# Patient Record
Sex: FEMALE | Race: BLACK OR AFRICAN AMERICAN | NOT HISPANIC OR LATINO | Employment: OTHER | URBAN - METROPOLITAN AREA
[De-identification: names, ages, dates, MRNs, and addresses within clinical notes are randomized per-mention and may not be internally consistent; named-entity substitution may affect disease eponyms.]

---

## 2017-12-22 ENCOUNTER — HISTORICAL (OUTPATIENT)
Dept: ADMINISTRATIVE | Facility: HOSPITAL | Age: 67
End: 2017-12-22

## 2017-12-22 LAB
T3FREE SERPL-MCNC: 2.36 PG/ML (ref 2.18–3.98)
T4 FREE SERPL-MCNC: 1.15 NG/DL (ref 0.76–1.46)
TSH SERPL-ACNC: 0.48 MIU/L (ref 0.36–3.74)

## 2018-01-15 ENCOUNTER — HISTORICAL (OUTPATIENT)
Dept: INTERNAL MEDICINE | Facility: CLINIC | Age: 68
End: 2018-01-15

## 2018-12-11 ENCOUNTER — HISTORICAL (OUTPATIENT)
Dept: ADMINISTRATIVE | Facility: HOSPITAL | Age: 68
End: 2018-12-11

## 2018-12-11 LAB
ABS NEUT (OLG): 3.01 X10(3)/MCL (ref 2.1–9.2)
ALBUMIN SERPL-MCNC: 3.7 GM/DL (ref 3.4–5)
ALBUMIN/GLOB SERPL: 1 RATIO (ref 1–2)
ALP SERPL-CCNC: 88 UNIT/L (ref 45–117)
ALT SERPL-CCNC: 30 UNIT/L (ref 12–78)
AST SERPL-CCNC: 16 UNIT/L (ref 15–37)
BASOPHILS # BLD AUTO: 0.03 X10(3)/MCL
BASOPHILS NFR BLD AUTO: 0 %
BILIRUB SERPL-MCNC: 0.3 MG/DL (ref 0.2–1)
BILIRUBIN DIRECT+TOT PNL SERPL-MCNC: 0.1 MG/DL
BILIRUBIN DIRECT+TOT PNL SERPL-MCNC: 0.2 MG/DL
BUN SERPL-MCNC: 24 MG/DL (ref 7–18)
CALCIUM SERPL-MCNC: 9 MG/DL (ref 8.5–10.1)
CHLORIDE SERPL-SCNC: 108 MMOL/L (ref 98–107)
CHOLEST SERPL-MCNC: 134 MG/DL
CHOLEST/HDLC SERPL: 2.5 {RATIO} (ref 0–4.4)
CO2 SERPL-SCNC: 30 MMOL/L (ref 21–32)
CREAT SERPL-MCNC: 1.1 MG/DL (ref 0.6–1.3)
EOSINOPHIL # BLD AUTO: 0.2 10*3/UL
EOSINOPHIL NFR BLD AUTO: 3 %
ERYTHROCYTE [DISTWIDTH] IN BLOOD BY AUTOMATED COUNT: 13.4 % (ref 11.5–14.5)
EST. AVERAGE GLUCOSE BLD GHB EST-MCNC: 117 MG/DL
GLOBULIN SER-MCNC: 4 GM/ML (ref 2.3–3.5)
GLUCOSE SERPL-MCNC: 102 MG/DL (ref 74–106)
HBA1C MFR BLD: 5.7 % (ref 4.2–6.3)
HCT VFR BLD AUTO: 44.6 % (ref 35–46)
HDLC SERPL-MCNC: 53 MG/DL
HGB BLD-MCNC: 15 GM/DL (ref 12–16)
IMM GRANULOCYTES # BLD AUTO: 0.03 10*3/UL
IMM GRANULOCYTES NFR BLD AUTO: 0 %
LDLC SERPL CALC-MCNC: 65 MG/DL (ref 0–130)
LYMPHOCYTES # BLD AUTO: 2.77 X10(3)/MCL
LYMPHOCYTES NFR BLD AUTO: 42 % (ref 13–40)
MCH RBC QN AUTO: 29.2 PG (ref 26–34)
MCHC RBC AUTO-ENTMCNC: 33.6 GM/DL (ref 31–37)
MCV RBC AUTO: 86.8 FL (ref 80–100)
MONOCYTES # BLD AUTO: 0.6 X10(3)/MCL
MONOCYTES NFR BLD AUTO: 9 % (ref 4–12)
NEUTROPHILS # BLD AUTO: 3.01 X10(3)/MCL
NEUTROPHILS NFR BLD AUTO: 45 X10(3)/MCL
PLATELET # BLD AUTO: 207 X10(3)/MCL (ref 130–400)
PMV BLD AUTO: 10.4 FL (ref 7.4–10.4)
POTASSIUM SERPL-SCNC: 3.4 MMOL/L (ref 3.5–5.1)
PROT SERPL-MCNC: 7.7 GM/DL (ref 6.4–8.2)
RBC # BLD AUTO: 5.14 X10(6)/MCL (ref 4–5.2)
SODIUM SERPL-SCNC: 144 MMOL/L (ref 136–145)
TRIGL SERPL-MCNC: 78 MG/DL
TSH SERPL-ACNC: 0.74 MIU/L (ref 0.36–3.74)
VLDLC SERPL CALC-MCNC: 16 MG/DL
WBC # SPEC AUTO: 6.6 X10(3)/MCL (ref 4.5–11)

## 2018-12-20 ENCOUNTER — HISTORICAL (OUTPATIENT)
Dept: INTERNAL MEDICINE | Facility: CLINIC | Age: 68
End: 2018-12-20

## 2019-05-07 ENCOUNTER — HISTORICAL (OUTPATIENT)
Dept: INTERNAL MEDICINE | Facility: CLINIC | Age: 69
End: 2019-05-07

## 2019-05-07 LAB
ABS NEUT (OLG): 3.06 X10(3)/MCL (ref 2.1–9.2)
ALBUMIN SERPL-MCNC: 3.7 GM/DL (ref 3.4–5)
ALBUMIN/GLOB SERPL: 0.9 RATIO (ref 1.1–2)
ALP SERPL-CCNC: 91 UNIT/L (ref 45–117)
ALT SERPL-CCNC: 28 UNIT/L (ref 12–78)
AST SERPL-CCNC: 18 UNIT/L (ref 15–37)
BASOPHILS # BLD AUTO: 0.04 X10(3)/MCL
BASOPHILS NFR BLD AUTO: 1 %
BILIRUB SERPL-MCNC: 0.5 MG/DL (ref 0.2–1)
BILIRUBIN DIRECT+TOT PNL SERPL-MCNC: 0.2 MG/DL
BILIRUBIN DIRECT+TOT PNL SERPL-MCNC: 0.3 MG/DL
BUN SERPL-MCNC: 22 MG/DL (ref 7–18)
CALCIUM SERPL-MCNC: 9.2 MG/DL (ref 8.5–10.1)
CHLORIDE SERPL-SCNC: 111 MMOL/L (ref 98–107)
CHOLEST SERPL-MCNC: 147 MG/DL
CHOLEST/HDLC SERPL: 2.8 {RATIO} (ref 0–4.4)
CO2 SERPL-SCNC: 28 MMOL/L (ref 21–32)
CREAT SERPL-MCNC: 1.2 MG/DL (ref 0.6–1.3)
EOSINOPHIL # BLD AUTO: 0.24 10*3/UL
EOSINOPHIL NFR BLD AUTO: 3 %
ERYTHROCYTE [DISTWIDTH] IN BLOOD BY AUTOMATED COUNT: 13.3 % (ref 11.5–14.5)
GLOBULIN SER-MCNC: 4.2 GM/ML (ref 2.3–3.5)
GLUCOSE SERPL-MCNC: 93 MG/DL (ref 74–106)
HCT VFR BLD AUTO: 45.5 % (ref 35–46)
HDLC SERPL-MCNC: 53 MG/DL
HGB BLD-MCNC: 14.9 GM/DL (ref 12–16)
IMM GRANULOCYTES # BLD AUTO: 0.02 10*3/UL
IMM GRANULOCYTES NFR BLD AUTO: 0 %
LDLC SERPL CALC-MCNC: 79 MG/DL (ref 0–130)
LYMPHOCYTES # BLD AUTO: 3.16 X10(3)/MCL
LYMPHOCYTES NFR BLD AUTO: 44 % (ref 13–40)
MCH RBC QN AUTO: 28.6 PG (ref 26–34)
MCHC RBC AUTO-ENTMCNC: 32.7 GM/DL (ref 31–37)
MCV RBC AUTO: 87.3 FL (ref 80–100)
MONOCYTES # BLD AUTO: 0.62 X10(3)/MCL
MONOCYTES NFR BLD AUTO: 9 % (ref 4–12)
NEUTROPHILS # BLD AUTO: 3.06 X10(3)/MCL
NEUTROPHILS NFR BLD AUTO: 43 X10(3)/MCL
PLATELET # BLD AUTO: 200 X10(3)/MCL (ref 130–400)
PMV BLD AUTO: 10.3 FL (ref 7.4–10.4)
POTASSIUM SERPL-SCNC: 3.8 MMOL/L (ref 3.5–5.1)
PROT SERPL-MCNC: 7.9 GM/DL (ref 6.4–8.2)
RBC # BLD AUTO: 5.21 X10(6)/MCL (ref 4–5.2)
SODIUM SERPL-SCNC: 144 MMOL/L (ref 136–145)
T3FREE SERPL-MCNC: 2.47 PG/ML (ref 2.18–3.98)
T4 FREE SERPL-MCNC: 1.16 NG/DL (ref 0.76–1.46)
TRIGL SERPL-MCNC: 75 MG/DL
TSH SERPL-ACNC: 0.57 MIU/L (ref 0.36–3.74)
VLDLC SERPL CALC-MCNC: 15 MG/DL
WBC # SPEC AUTO: 7.1 X10(3)/MCL (ref 4.5–11)

## 2021-11-29 ENCOUNTER — HISTORICAL (OUTPATIENT)
Dept: RADIOLOGY | Facility: HOSPITAL | Age: 71
End: 2021-11-29

## 2021-12-08 ENCOUNTER — HISTORICAL (OUTPATIENT)
Dept: LAB | Facility: HOSPITAL | Age: 71
End: 2021-12-08

## 2021-12-08 LAB
ABS NEUT (OLG): 3.42 X10(3)/MCL (ref 2.1–9.2)
ALBUMIN SERPL-MCNC: 4.1 GM/DL (ref 3.4–4.8)
ALBUMIN/GLOB SERPL: 1.3 RATIO (ref 1.1–2)
ALP SERPL-CCNC: 94 UNIT/L (ref 40–150)
ALT SERPL-CCNC: 21 UNIT/L (ref 0–55)
APPEARANCE, UA: CLEAR
AST SERPL-CCNC: 23 UNIT/L (ref 5–34)
BACTERIA SPEC CULT: NORMAL
BASOPHILS # BLD AUTO: 0 X10(3)/MCL (ref 0–0.2)
BASOPHILS NFR BLD AUTO: 0 %
BILIRUB SERPL-MCNC: 0.6 MG/DL
BILIRUB UR QL STRIP: NEGATIVE
BILIRUBIN DIRECT+TOT PNL SERPL-MCNC: 0.2 MG/DL (ref 0–0.5)
BILIRUBIN DIRECT+TOT PNL SERPL-MCNC: 0.4 MG/DL (ref 0–0.8)
BUN SERPL-MCNC: 33.3 MG/DL (ref 9.8–20.1)
CALCIUM SERPL-MCNC: 9.9 MG/DL (ref 8.7–10.5)
CHLORIDE SERPL-SCNC: 107 MMOL/L (ref 98–107)
CHOLEST SERPL-MCNC: 180 MG/DL
CHOLEST/HDLC SERPL: 3 {RATIO} (ref 0–5)
CO2 SERPL-SCNC: 26 MMOL/L (ref 23–31)
COLOR UR: YELLOW
CREAT SERPL-MCNC: 2.06 MG/DL (ref 0.55–1.02)
CREAT UR-MCNC: 70.2 MG/DL (ref 45–106)
EOSINOPHIL # BLD AUTO: 0.2 X10(3)/MCL (ref 0–0.9)
EOSINOPHIL NFR BLD AUTO: 3 %
ERYTHROCYTE [DISTWIDTH] IN BLOOD BY AUTOMATED COUNT: 14 % (ref 11.5–17)
EST. AVERAGE GLUCOSE BLD GHB EST-MCNC: 102.5 MG/DL
GLOBULIN SER-MCNC: 3.2 GM/DL (ref 2.4–3.5)
GLUCOSE (UA): NEGATIVE
GLUCOSE SERPL-MCNC: 84 MG/DL (ref 82–115)
HBA1C MFR BLD: 5.2 %
HCT VFR BLD AUTO: 42.1 % (ref 37–47)
HDLC SERPL-MCNC: 62 MG/DL (ref 35–60)
HGB BLD-MCNC: 13.7 GM/DL (ref 12–16)
HGB UR QL STRIP: NEGATIVE
IMM GRANULOCYTES # BLD AUTO: 0.01 % (ref 0–0.02)
IMM GRANULOCYTES NFR BLD AUTO: 0.2 % (ref 0–0.43)
KETONES UR QL STRIP: NEGATIVE
LDLC SERPL CALC-MCNC: 106 MG/DL (ref 50–140)
LEUKOCYTE ESTERASE UR QL STRIP: NEGATIVE
LYMPHOCYTES # BLD AUTO: 2.1 X10(3)/MCL (ref 0.6–4.6)
LYMPHOCYTES NFR BLD AUTO: 33 %
MCH RBC QN AUTO: 28.8 PG (ref 27–31)
MCHC RBC AUTO-ENTMCNC: 32.5 GM/DL (ref 33–36)
MCV RBC AUTO: 88.4 FL (ref 80–94)
MONOCYTES # BLD AUTO: 0.6 X10(3)/MCL (ref 0.1–1.3)
MONOCYTES NFR BLD AUTO: 9 %
NEUTROPHILS # BLD AUTO: 3.42 X10(3)/MCL (ref 1.4–7.9)
NEUTROPHILS NFR BLD AUTO: 54 %
NITRITE UR QL STRIP: NEGATIVE
PH UR STRIP: 7 [PH] (ref 5–9)
PLATELET # BLD AUTO: 227 X10(3)/MCL (ref 130–400)
PMV BLD AUTO: 10.8 FL (ref 9.4–12.4)
POTASSIUM SERPL-SCNC: 4 MMOL/L (ref 3.5–5.1)
PROT SERPL-MCNC: 7.3 GM/DL (ref 5.8–7.6)
PROT UR QL STRIP: >=300
PROT UR STRIP-MCNC: 159.5 MG/DL
RBC # BLD AUTO: 4.76 X10(6)/MCL (ref 4.2–5.4)
RBC #/AREA URNS HPF: NORMAL /[HPF]
SODIUM SERPL-SCNC: 143 MMOL/L (ref 136–145)
SP GR UR STRIP: 1.02 (ref 1–1.03)
SQUAMOUS EPITHELIAL, UA: NORMAL
T4 FREE SERPL-MCNC: 1 NG/DL (ref 0.7–1.48)
TRIGL SERPL-MCNC: 61 MG/DL (ref 37–140)
TSH SERPL-ACNC: 1.14 UIU/ML (ref 0.35–4.94)
UROBILINOGEN UR STRIP-ACNC: 0.2
VLDLC SERPL CALC-MCNC: 12 MG/DL
WBC # SPEC AUTO: 6.3 X10(3)/MCL (ref 4.5–11.5)
WBC #/AREA URNS HPF: NORMAL /[HPF]

## 2021-12-09 ENCOUNTER — HISTORICAL (OUTPATIENT)
Dept: ADMINISTRATIVE | Facility: HOSPITAL | Age: 71
End: 2021-12-09

## 2021-12-09 ENCOUNTER — HISTORICAL (OUTPATIENT)
Dept: LAB | Facility: HOSPITAL | Age: 71
End: 2021-12-09

## 2021-12-09 LAB
CREAT UR-MCNC: 67.8 MG/DL (ref 45–106)
PROT UR STRIP-MCNC: 54.9 MG/DL
PROT/CREAT UR-RTO: 809.7 MG/GM CR

## 2021-12-28 LAB — BCS RECOMMENDATION EXT: NORMAL

## 2022-03-14 ENCOUNTER — HISTORICAL (OUTPATIENT)
Dept: ADMINISTRATIVE | Facility: HOSPITAL | Age: 72
End: 2022-03-14

## 2022-03-21 ENCOUNTER — HISTORICAL (OUTPATIENT)
Dept: RADIOLOGY | Facility: HOSPITAL | Age: 72
End: 2022-03-21

## 2022-03-31 ENCOUNTER — HISTORICAL (OUTPATIENT)
Dept: RADIOLOGY | Facility: HOSPITAL | Age: 72
End: 2022-03-31

## 2022-03-31 ENCOUNTER — HISTORICAL (OUTPATIENT)
Dept: ADMINISTRATIVE | Facility: HOSPITAL | Age: 72
End: 2022-03-31

## 2022-04-01 ENCOUNTER — HISTORICAL (OUTPATIENT)
Dept: LAB | Facility: HOSPITAL | Age: 72
End: 2022-04-01

## 2022-04-01 LAB
ABS NEUT (OLG): 3.26 (ref 2.1–9.2)
ALBUMIN SERPL-MCNC: 4 G/DL (ref 3.4–4.8)
ALBUMIN/GLOB SERPL: 1.1 {RATIO} (ref 1.1–2)
ALP SERPL-CCNC: 90 U/L (ref 40–150)
ALT SERPL-CCNC: 26 U/L (ref 0–55)
APPEARANCE, UA: CLEAR
AST SERPL-CCNC: 22 U/L (ref 5–34)
BACTERIA SPEC CULT: NORMAL
BASOPHILS # BLD AUTO: 0 10*3/UL (ref 0–0.2)
BASOPHILS NFR BLD AUTO: 0 %
BILIRUB SERPL-MCNC: 0.6 MG/DL
BILIRUB UR QL STRIP: NEGATIVE
BILIRUBIN DIRECT+TOT PNL SERPL-MCNC: 0.3 (ref 0–0.5)
BILIRUBIN DIRECT+TOT PNL SERPL-MCNC: 0.3 (ref 0–0.8)
BUN SERPL-MCNC: 32.2 MG/DL (ref 9.8–20.1)
C3 SERPL-MCNC: 123 MG/DL (ref 80–173)
C4 SERPL-MCNC: 55 MG/DL (ref 13–46)
CALCIUM SERPL-MCNC: 9.9 MG/DL (ref 8.7–10.5)
CHLORIDE SERPL-SCNC: 109 MMOL/L (ref 98–107)
CO2 SERPL-SCNC: 26 MMOL/L (ref 23–31)
COLOR UR: YELLOW
CREAT SERPL-MCNC: 2.37 MG/DL (ref 0.55–1.02)
CREAT UR-MCNC: 152.3 MG/DL (ref 45–106)
CRP SERPL-MCNC: 1 MG/L (ref 0–1)
EOSINOPHIL # BLD AUTO: 0.3 10*3/UL (ref 0–0.9)
EOSINOPHIL NFR BLD AUTO: 5 %
ERYTHROCYTE [DISTWIDTH] IN BLOOD BY AUTOMATED COUNT: 13.5 % (ref 11.5–17)
GLOBULIN SER-MCNC: 3.5 G/DL (ref 2.4–3.5)
GLUCOSE (UA): NEGATIVE
GLUCOSE SERPL-MCNC: 88 MG/DL (ref 82–115)
HBV SURFACE AG SERPL QL IA: 0.29
HBV SURFACE AG SERPL QL IA: NONREACTIVE
HCT VFR BLD AUTO: 41 % (ref 37–47)
HCV AB SERPL QL IA: 0.47
HCV AB SERPL QL IA: NONREACTIVE
HEMOLYSIS INTERF INDEX SERPL-ACNC: 0
HGB BLD-MCNC: 12.9 G/DL (ref 12–16)
HGB UR QL STRIP: NEGATIVE
ICTERIC INTERF INDEX SERPL-ACNC: 1
IMM GRANULOCYTES # BLD AUTO: 0.01 10*3/UL (ref 0–0.02)
IMM GRANULOCYTES NFR BLD AUTO: 0.2 % (ref 0–0.43)
KETONES UR QL STRIP: NEGATIVE
LEUKOCYTE ESTERASE UR QL STRIP: NEGATIVE
LIPEMIC INTERF INDEX SERPL-ACNC: 2
LYMPHOCYTES # BLD AUTO: 1.6 10*3/UL (ref 0.6–4.6)
LYMPHOCYTES NFR BLD AUTO: 28 %
MANUAL DIFF? (OHS): NO
MCH RBC QN AUTO: 28.4 PG (ref 27–31)
MCHC RBC AUTO-ENTMCNC: 31.5 G/DL (ref 33–36)
MCV RBC AUTO: 90.3 FL (ref 80–94)
MONOCYTES # BLD AUTO: 0.6 10*3/UL (ref 0.1–1.3)
MONOCYTES NFR BLD AUTO: 10 %
NEUTROPHILS # BLD AUTO: 3.26 10*3/UL (ref 1.4–7.9)
NEUTROPHILS NFR BLD AUTO: 56 %
NITRITE UR QL STRIP: NEGATIVE
PH UR STRIP: 6 [PH] (ref 5–9)
PHOSPHATE SERPL-MCNC: 3.7 MG/DL (ref 2.3–4.7)
PLATELET # BLD AUTO: 170 10*3/UL (ref 130–400)
PMV BLD AUTO: 10.6 FL (ref 9.4–12.4)
POTASSIUM SERPL-SCNC: 3.5 MMOL/L (ref 3.5–5.1)
PROT SERPL-MCNC: 7.5 G/DL (ref 5.8–7.6)
PROT UR QL STRIP: >=300
PROT UR STRIP-MCNC: 234.3 MG/DL
PTH-INTACT SERPL-MCNC: 381.8 PG/ML (ref 8.7–77.1)
RBC # BLD AUTO: 4.54 10*6/UL (ref 4.2–5.4)
RBC #/AREA URNS HPF: NORMAL /[HPF] (ref 0–2)
SODIUM SERPL-SCNC: 146 MMOL/L (ref 136–145)
SP GR UR STRIP: >=1.03 (ref 1–1.03)
SQUAMOUS EPITHELIAL, UA: NORMAL
UROBILINOGEN UR STRIP-ACNC: 0.2
WBC # SPEC AUTO: 5.8 10*3/UL (ref 4.5–11.5)
WBC #/AREA URNS HPF: NORMAL /[HPF] (ref 0–2)

## 2022-04-03 LAB
ANTINUCLEAR ANTIBODY SCREEN (OHS): NEGATIVE
DSDNA AB QUANT (OHS): 0.7
DSDNA ANTIBODY (OHS): NEGATIVE

## 2022-04-11 ENCOUNTER — HISTORICAL (OUTPATIENT)
Dept: ADMINISTRATIVE | Facility: HOSPITAL | Age: 72
End: 2022-04-11
Payer: MEDICARE

## 2022-04-25 DIAGNOSIS — N18.30 STAGE 3 CHRONIC KIDNEY DISEASE, UNSPECIFIED WHETHER STAGE 3A OR 3B CKD: Primary | ICD-10-CM

## 2022-04-25 DIAGNOSIS — I10 HYPERTENSION, UNSPECIFIED TYPE: ICD-10-CM

## 2022-04-28 VITALS
SYSTOLIC BLOOD PRESSURE: 172 MMHG | OXYGEN SATURATION: 96 % | BODY MASS INDEX: 39.1 KG/M2 | HEIGHT: 63 IN | DIASTOLIC BLOOD PRESSURE: 104 MMHG | WEIGHT: 220.69 LBS

## 2022-04-29 LAB — NONINV COLON CA DNA+OCC BLD SCRN STL QL: NEGATIVE

## 2022-05-04 ENCOUNTER — HOSPITAL ENCOUNTER (OUTPATIENT)
Dept: RADIOLOGY | Facility: HOSPITAL | Age: 72
Discharge: HOME | End: 2022-05-04
Attending: INTERNAL MEDICINE
Payer: MEDICARE

## 2022-05-04 DIAGNOSIS — N18.30 STAGE 3 CHRONIC KIDNEY DISEASE, UNSPECIFIED WHETHER STAGE 3A OR 3B CKD: ICD-10-CM

## 2022-05-04 DIAGNOSIS — I10 HYPERTENSION, UNSPECIFIED TYPE: ICD-10-CM

## 2022-05-04 PROCEDURE — 76770 US EXAM ABDO BACK WALL COMP: CPT | Mod: 59,TC

## 2022-05-04 RX ORDER — HYDRALAZINE HYDROCHLORIDE 50 MG/1
50 TABLET, FILM COATED ORAL 3 TIMES DAILY
COMMUNITY
Start: 2022-02-24 | End: 2022-11-14 | Stop reason: SDUPTHER

## 2022-05-04 RX ORDER — NITROGLYCERIN 0.4 MG/1
0.4 TABLET SUBLINGUAL
COMMUNITY
Start: 2021-10-31

## 2022-05-04 RX ORDER — LOSARTAN POTASSIUM 50 MG/1
50 TABLET ORAL DAILY
COMMUNITY
Start: 2022-04-18 | End: 2022-11-14 | Stop reason: SDUPTHER

## 2022-05-04 RX ORDER — NIFEDIPINE 60 MG/1
60 TABLET, EXTENDED RELEASE ORAL 2 TIMES DAILY
COMMUNITY
Start: 2022-04-25 | End: 2022-11-15 | Stop reason: SDUPTHER

## 2022-05-04 RX ORDER — ATORVASTATIN CALCIUM 40 MG/1
40 TABLET, FILM COATED ORAL DAILY
COMMUNITY
Start: 2022-03-23 | End: 2022-10-05

## 2022-05-04 RX ORDER — FLUTICASONE PROPIONATE 50 MCG
SPRAY, SUSPENSION (ML) NASAL
COMMUNITY
Start: 2022-03-15 | End: 2023-07-14

## 2022-05-04 RX ORDER — CALCITRIOL 0.25 UG/1
0.25 CAPSULE ORAL EVERY OTHER DAY
COMMUNITY
Start: 2022-04-18 | End: 2022-11-15 | Stop reason: SDUPTHER

## 2022-05-04 RX ORDER — METOPROLOL SUCCINATE 25 MG/1
25 TABLET, EXTENDED RELEASE ORAL 2 TIMES DAILY
COMMUNITY
Start: 2022-04-18 | End: 2022-05-25

## 2022-05-04 RX ORDER — DICLOFENAC SODIUM 10 MG/G
GEL TOPICAL
COMMUNITY
Start: 2022-03-14 | End: 2023-07-14

## 2022-05-09 ENCOUNTER — OFFICE VISIT (OUTPATIENT)
Dept: NEPHROLOGY | Facility: CLINIC | Age: 72
End: 2022-05-09
Payer: MEDICARE

## 2022-05-09 VITALS
OXYGEN SATURATION: 94 % | SYSTOLIC BLOOD PRESSURE: 170 MMHG | RESPIRATION RATE: 20 BRPM | HEART RATE: 74 BPM | TEMPERATURE: 98 F | WEIGHT: 218.25 LBS | BODY MASS INDEX: 38.67 KG/M2 | DIASTOLIC BLOOD PRESSURE: 110 MMHG | HEIGHT: 63 IN

## 2022-05-09 DIAGNOSIS — N18.30 STAGE 3 CHRONIC KIDNEY DISEASE, UNSPECIFIED WHETHER STAGE 3A OR 3B CKD: ICD-10-CM

## 2022-05-09 DIAGNOSIS — I10 HYPERTENSION, UNSPECIFIED TYPE: Primary | ICD-10-CM

## 2022-05-09 PROCEDURE — 4010F PR ACE/ARB THEARPY RXD/TAKEN: ICD-10-PCS | Mod: CPTII,S$GLB,, | Performed by: INTERNAL MEDICINE

## 2022-05-09 PROCEDURE — 3008F PR BODY MASS INDEX (BMI) DOCUMENTED: ICD-10-PCS | Mod: CPTII,S$GLB,, | Performed by: INTERNAL MEDICINE

## 2022-05-09 PROCEDURE — 99213 OFFICE O/P EST LOW 20 MIN: CPT | Mod: S$GLB,,, | Performed by: INTERNAL MEDICINE

## 2022-05-09 PROCEDURE — 1126F AMNT PAIN NOTED NONE PRSNT: CPT | Mod: CPTII,S$GLB,, | Performed by: INTERNAL MEDICINE

## 2022-05-09 PROCEDURE — 4010F ACE/ARB THERAPY RXD/TAKEN: CPT | Mod: CPTII,S$GLB,, | Performed by: INTERNAL MEDICINE

## 2022-05-09 PROCEDURE — 1101F PT FALLS ASSESS-DOCD LE1/YR: CPT | Mod: CPTII,S$GLB,, | Performed by: INTERNAL MEDICINE

## 2022-05-09 PROCEDURE — 99999 PR PBB SHADOW E&M-EST. PATIENT-LVL IV: CPT | Mod: PBBFAC,,, | Performed by: INTERNAL MEDICINE

## 2022-05-09 PROCEDURE — 1159F MED LIST DOCD IN RCRD: CPT | Mod: CPTII,S$GLB,, | Performed by: INTERNAL MEDICINE

## 2022-05-09 PROCEDURE — 1126F PR PAIN SEVERITY QUANTIFIED, NO PAIN PRESENT: ICD-10-PCS | Mod: CPTII,S$GLB,, | Performed by: INTERNAL MEDICINE

## 2022-05-09 PROCEDURE — 3080F PR MOST RECENT DIASTOLIC BLOOD PRESSURE >= 90 MM HG: ICD-10-PCS | Mod: CPTII,S$GLB,, | Performed by: INTERNAL MEDICINE

## 2022-05-09 PROCEDURE — 3288F FALL RISK ASSESSMENT DOCD: CPT | Mod: CPTII,S$GLB,, | Performed by: INTERNAL MEDICINE

## 2022-05-09 PROCEDURE — 3077F PR MOST RECENT SYSTOLIC BLOOD PRESSURE >= 140 MM HG: ICD-10-PCS | Mod: CPTII,S$GLB,, | Performed by: INTERNAL MEDICINE

## 2022-05-09 PROCEDURE — 3077F SYST BP >= 140 MM HG: CPT | Mod: CPTII,S$GLB,, | Performed by: INTERNAL MEDICINE

## 2022-05-09 PROCEDURE — 3066F NEPHROPATHY DOC TX: CPT | Mod: CPTII,S$GLB,, | Performed by: INTERNAL MEDICINE

## 2022-05-09 PROCEDURE — 3288F PR FALLS RISK ASSESSMENT DOCUMENTED: ICD-10-PCS | Mod: CPTII,S$GLB,, | Performed by: INTERNAL MEDICINE

## 2022-05-09 PROCEDURE — 99213 PR OFFICE/OUTPT VISIT, EST, LEVL III, 20-29 MIN: ICD-10-PCS | Mod: S$GLB,,, | Performed by: INTERNAL MEDICINE

## 2022-05-09 PROCEDURE — 3008F BODY MASS INDEX DOCD: CPT | Mod: CPTII,S$GLB,, | Performed by: INTERNAL MEDICINE

## 2022-05-09 PROCEDURE — 99999 PR PBB SHADOW E&M-EST. PATIENT-LVL IV: ICD-10-PCS | Mod: PBBFAC,,, | Performed by: INTERNAL MEDICINE

## 2022-05-09 PROCEDURE — 1101F PR PT FALLS ASSESS DOC 0-1 FALLS W/OUT INJ PAST YR: ICD-10-PCS | Mod: CPTII,S$GLB,, | Performed by: INTERNAL MEDICINE

## 2022-05-09 PROCEDURE — 3066F PR DOCUMENTATION OF TREATMENT FOR NEPHROPATHY: ICD-10-PCS | Mod: CPTII,S$GLB,, | Performed by: INTERNAL MEDICINE

## 2022-05-09 PROCEDURE — 3080F DIAST BP >= 90 MM HG: CPT | Mod: CPTII,S$GLB,, | Performed by: INTERNAL MEDICINE

## 2022-05-09 PROCEDURE — 1159F PR MEDICATION LIST DOCUMENTED IN MEDICAL RECORD: ICD-10-PCS | Mod: CPTII,S$GLB,, | Performed by: INTERNAL MEDICINE

## 2022-05-09 RX ORDER — CLONIDINE HYDROCHLORIDE 0.1 MG/1
0.1 TABLET ORAL
Status: COMPLETED | OUTPATIENT
Start: 2022-05-09 | End: 2022-05-09

## 2022-05-09 RX ORDER — SPIRONOLACTONE 25 MG/1
25 TABLET ORAL DAILY
Qty: 30 TABLET | Refills: 11 | Status: SHIPPED | OUTPATIENT
Start: 2022-05-09 | End: 2022-11-14 | Stop reason: SDUPTHER

## 2022-05-09 RX ORDER — CLONIDINE HYDROCHLORIDE 0.2 MG/1
0.2 TABLET ORAL
Status: COMPLETED | OUTPATIENT
Start: 2022-05-09 | End: 2022-05-09

## 2022-05-09 RX ORDER — CLONIDINE 0.3 MG/24H
1 PATCH, EXTENDED RELEASE TRANSDERMAL
Qty: 4 PATCH | Refills: 11 | Status: SHIPPED | OUTPATIENT
Start: 2022-05-09 | End: 2022-08-01

## 2022-05-09 RX ADMIN — CLONIDINE HYDROCHLORIDE 0.1 MG: 0.1 TABLET ORAL at 03:05

## 2022-05-09 RX ADMIN — CLONIDINE HYDROCHLORIDE 0.2 MG: 0.2 TABLET ORAL at 03:05

## 2022-05-09 NOTE — PROGRESS NOTES
Patient, Jesi Cha (MRN #[id number]), presented with a recent Estimated Glumerular Filtration Rate (EGFR) between 15 and 29 consistent with the definition of chronic kidney disease stage 4 (ICD10 - N18.4).    No results found for: ESTGFRAFRICA    The patient's chronic kidney disease stage 4 was monitored, evaluated, addressed and/or treated. This addendum to the medical record is made on 05/09/2022.

## 2022-05-09 NOTE — PROGRESS NOTES
[facility] Ambulatory Progress Note      HPI   Jesi Cha is a 71 y.o. female here for a follow up visit  Pt with CKD3 related to nephrosclerosis and hyperfiltration  Here for FU due to significant HTN  Nifedipine xl 60mg po bid had been ordered  Most recent cr  = 2.3  High [place]/renin, but [place] by itself not significantly elevated  CT abd negative for adrenal masses on   US kidneys 3/22--> no masses or hydronephrosis    Medical History:   Past Medical History:   Diagnosis Date    HTN (hypertension)     Hypokalemia     Obesity, unspecified     Thyroid cyst        Surgical History:   Past Surgical History:   Procedure Laterality Date    APPENDECTOMY       SECTION      CHOLECYSTECTOMY         Family History:   Family History   Problem Relation Age of Onset    Arthritis Mother    .     Social History:   Social History     Tobacco Use    Smoking status: Never Smoker    Smokeless tobacco: Never Used   Substance Use Topics    Alcohol use: Never       Allergies:  Review of patient's allergies indicates:   Allergen Reactions    Codeine        Medications:    Current Outpatient Medications:     aspirin 81 mg Cap, Take 81 mg by mouth once daily at 6am., Disp: , Rfl:     atorvastatin (LIPITOR) 40 MG tablet, Take 40 mg by mouth once daily., Disp: , Rfl:     calcitRIOL (ROCALTROL) 0.25 MCG Cap, Take 0.25 mcg by mouth once daily., Disp: , Rfl:     diclofenac sodium (VOLTAREN) 1 % Gel, APPLY 4 GRAMS TOPICALLY 4 TIMES DAILY AS NEEDED FOR PAIN APPLY A THIN FILM TO EACH KNEE AS NEEDED FOR ARTHRITIS PAIN. NOT TO EXCEED 16 GRAMS/DAY/SINGLE JOINT OF LOWER EXTREMITIES., Disp: , Rfl:     fluticasone propionate (FLONASE) 50 mcg/actuation nasal spray, USE 1 TO 2 SPRAY(S) IN EACH NOSTRIL TWICE DAILY FOR ALLERGY SYMPTOMS, Disp: , Rfl:     hydrALAZINE (APRESOLINE) 50 MG tablet, Take 50 mg by mouth 3 (three) times daily., Disp: , Rfl:     losartan (COZAAR) 50 MG tablet, Take 50 mg by mouth 2 (two)  "times daily., Disp: , Rfl:     metoprolol succinate (TOPROL-XL) 25 MG 24 hr tablet, Take 25 mg by mouth 2 (two) times daily., Disp: , Rfl:     NIFEdipine (PROCARDIA-XL) 60 MG (OSM) 24 hr tablet, Take 60 mg by mouth 2 (two) times a day., Disp: , Rfl:     nitroGLYCERIN (NITROSTAT) 0.4 MG SL tablet, Place 0.4 mg under the tongue., Disp: , Rfl:        ROS:    Constitutional: No fever, fatigue or weight loss  Skin: No wounds, rashes or lesions; no itching  EENT: no ear pain, congestion, drainage, or sore throat  Respiratory:  No cough, shortness of breath, or wheezing  Cardiovascular: No chest pain, palpitations, or swelling  Gastrointestional: No abdominal pain, n/v/d/c  Genitourinary: no dysuria, hematuria, or incontinence; able to empty bladder  Musculoskeletal: no joint pain or swelling  Neurological: No headaches, seizures, numbness, tingling, or weakness  Hematological: No unusual bruising or bleeding  Psychiatric: No psychiatric concerns, hallucinations, or depression; no confusion      Vital Signs:  BP (!) 180/110 (BP Location: Left arm, Patient Position: Sitting)   Pulse 74   Temp 98.2 °F (36.8 °C) (Oral)   Resp 20   Ht 5' 3" (1.6 m)   Wt 99 kg (218 lb 4.1 oz)   SpO2 (!) 94%   BMI 38.66 kg/m²   Body mass index is 38.66 kg/m².     Repeat /105  Pt asymptomatic      Physical Exam:    General: no acute distress, awake, alert, well-nourished  Eyes: PERRLA, EOMI, conjunctiva clear with no exudate, eyelids without swelling  HENT: atraumatic, oropharynx and nasal mucosa patent without drainage  Neck: full ROM, no JVD, no thyromegaly or lymphadenopathy  Respiratory: equal, unlabored, clear to auscultation A/P  Cardiovascular: RRR without murmur or rub; radial and pedal pulses intact  Edema: none  Gastrointestinal: soft, non-tender, non-distended; positive bowel sounds; no masses to palpation  Genitourinary: no CVA tenderness upon palpation  Musculoskeletal: full ROM of all extremities and spine without " limitation or discomfort  Integumentary: warm, dry; no rashes or skin lesions  Neurological: oriented x 4, appropriate, no acute deficits      Labs:        Component Value Date/Time    CO2 26 05/04/2022 0919    CO2 26 12/08/2021 0915    BUN 46.8 (H) 05/04/2022 0919    BUN 33.3 (H) 12/08/2021 0915    CREATININE 2.56 (H) 05/04/2022 0919    CREATININE 2.06 (H) 12/08/2021 0915    CALCIUM 9.8 05/04/2022 0919    CALCIUM 9.9 12/08/2021 0915            Component Value Date/Time    WBC 6.3 12/08/2021 0915    WBC 7.1 05/07/2019 1006    HGB 13.7 12/08/2021 0915    HGB 14.9 05/07/2019 1006    HCT 42.1 12/08/2021 0915    HCT 45.5 05/07/2019 1006       @[id number](coloru,clarityu,specgrav,phur,proteinua,glucoseu,bilirubincon,bloodu,wbcu,rbcu,bacteria,mucus,nitrite,leukocytesur,urobilinogen,hyalinecasts)@      Impression:    There is no problem list on file for this patient.    1. Hypertension, unspecified type     2. Stage 3 chronic kidney disease, unspecified whether stage 3a or 3b CKD     CKD3 related to nephrosclerosis  Severe HTN  High [place]/renin ratio previously, no lokesh evidence of adrenal adenoma/mass        Plan:  Clonidine 0.2 mg po now  Add clonidine patch 0.2mg/week  Add aldactone 25 mg po daily  Labs in one week  FU in 2 weeks       Annabel Clemons

## 2022-05-09 NOTE — PROGRESS NOTES
Patient, Jesi Cha (MRN #[id number]), presented with a recorded BMI of 38.66 kg/m^2 and a documented comorbidity(s):  - Hypertension  to which the severe obesity is a contributing factor. This is consistent with the definition of severe obesity (BMI 35.0-39.9) with comorbidity (ICD-10 E66.01, Z68.35). The patient's severe obesity was monitored, evaluated, addressed and/or treated. This addendum to the medical record is made on 05/09/2022.

## 2022-05-18 ENCOUNTER — LAB VISIT (OUTPATIENT)
Dept: LAB | Facility: HOSPITAL | Age: 72
End: 2022-05-18
Attending: INTERNAL MEDICINE
Payer: MEDICARE

## 2022-05-18 DIAGNOSIS — I10 HYPERTENSION, UNSPECIFIED TYPE: ICD-10-CM

## 2022-05-18 DIAGNOSIS — N18.30 STAGE 3 CHRONIC KIDNEY DISEASE, UNSPECIFIED WHETHER STAGE 3A OR 3B CKD: ICD-10-CM

## 2022-05-18 LAB
ALBUMIN SERPL-MCNC: 3.9 GM/DL (ref 3.4–4.8)
ALBUMIN/GLOB SERPL: 1 RATIO (ref 1.1–2)
ALP SERPL-CCNC: 96 UNIT/L (ref 40–150)
ALT SERPL-CCNC: 19 UNIT/L (ref 0–55)
AST SERPL-CCNC: 20 UNIT/L (ref 5–34)
BILIRUBIN DIRECT+TOT PNL SERPL-MCNC: 0.5 MG/DL
BUN SERPL-MCNC: 46.3 MG/DL (ref 9.8–20.1)
CALCIUM SERPL-MCNC: 10.4 MG/DL (ref 8.4–10.2)
CHLORIDE SERPL-SCNC: 104 MMOL/L (ref 98–107)
CO2 SERPL-SCNC: 27 MMOL/L (ref 23–31)
CREAT SERPL-MCNC: 2.95 MG/DL (ref 0.55–1.02)
GLOBULIN SER-MCNC: 3.9 GM/DL (ref 2.4–3.5)
GLUCOSE SERPL-MCNC: 91 MG/DL (ref 82–115)
POTASSIUM SERPL-SCNC: 4.7 MMOL/L (ref 3.5–5.1)
PROT SERPL-MCNC: 7.8 GM/DL (ref 5.8–7.6)
PTH-INTACT SERPL-MCNC: 134.1 PG/ML (ref 8.7–77)
SODIUM SERPL-SCNC: 143 MMOL/L (ref 136–145)

## 2022-05-18 PROCEDURE — 83970 ASSAY OF PARATHORMONE: CPT

## 2022-05-18 PROCEDURE — 36415 COLL VENOUS BLD VENIPUNCTURE: CPT

## 2022-05-18 PROCEDURE — 80053 COMPREHEN METABOLIC PANEL: CPT

## 2022-05-25 ENCOUNTER — OFFICE VISIT (OUTPATIENT)
Dept: NEPHROLOGY | Facility: CLINIC | Age: 72
End: 2022-05-25
Payer: MEDICARE

## 2022-05-25 VITALS
HEART RATE: 80 BPM | WEIGHT: 218.25 LBS | DIASTOLIC BLOOD PRESSURE: 98 MMHG | BODY MASS INDEX: 38.67 KG/M2 | TEMPERATURE: 98 F | HEIGHT: 63 IN | SYSTOLIC BLOOD PRESSURE: 150 MMHG | OXYGEN SATURATION: 97 % | RESPIRATION RATE: 20 BRPM

## 2022-05-25 DIAGNOSIS — I10 HYPERTENSION, UNSPECIFIED TYPE: ICD-10-CM

## 2022-05-25 DIAGNOSIS — E83.52 HYPERCALCEMIA: ICD-10-CM

## 2022-05-25 DIAGNOSIS — N25.81 SECONDARY HYPERPARATHYROIDISM OF RENAL ORIGIN: ICD-10-CM

## 2022-05-25 DIAGNOSIS — N18.30 STAGE 3 CHRONIC KIDNEY DISEASE, UNSPECIFIED WHETHER STAGE 3A OR 3B CKD: Primary | ICD-10-CM

## 2022-05-25 PROCEDURE — 4010F ACE/ARB THERAPY RXD/TAKEN: CPT | Mod: CPTII,S$GLB,, | Performed by: INTERNAL MEDICINE

## 2022-05-25 PROCEDURE — 3288F FALL RISK ASSESSMENT DOCD: CPT | Mod: CPTII,S$GLB,, | Performed by: INTERNAL MEDICINE

## 2022-05-25 PROCEDURE — 1159F PR MEDICATION LIST DOCUMENTED IN MEDICAL RECORD: ICD-10-PCS | Mod: CPTII,S$GLB,, | Performed by: INTERNAL MEDICINE

## 2022-05-25 PROCEDURE — 99214 OFFICE O/P EST MOD 30 MIN: CPT | Mod: S$GLB,,, | Performed by: INTERNAL MEDICINE

## 2022-05-25 PROCEDURE — 4010F PR ACE/ARB THEARPY RXD/TAKEN: ICD-10-PCS | Mod: CPTII,S$GLB,, | Performed by: INTERNAL MEDICINE

## 2022-05-25 PROCEDURE — 3008F PR BODY MASS INDEX (BMI) DOCUMENTED: ICD-10-PCS | Mod: CPTII,S$GLB,, | Performed by: INTERNAL MEDICINE

## 2022-05-25 PROCEDURE — 3008F BODY MASS INDEX DOCD: CPT | Mod: CPTII,S$GLB,, | Performed by: INTERNAL MEDICINE

## 2022-05-25 PROCEDURE — 99214 PR OFFICE/OUTPT VISIT, EST, LEVL IV, 30-39 MIN: ICD-10-PCS | Mod: S$GLB,,, | Performed by: INTERNAL MEDICINE

## 2022-05-25 PROCEDURE — 3077F PR MOST RECENT SYSTOLIC BLOOD PRESSURE >= 140 MM HG: ICD-10-PCS | Mod: CPTII,S$GLB,, | Performed by: INTERNAL MEDICINE

## 2022-05-25 PROCEDURE — 1126F PR PAIN SEVERITY QUANTIFIED, NO PAIN PRESENT: ICD-10-PCS | Mod: CPTII,S$GLB,, | Performed by: INTERNAL MEDICINE

## 2022-05-25 PROCEDURE — 99999 PR PBB SHADOW E&M-EST. PATIENT-LVL IV: CPT | Mod: PBBFAC,,, | Performed by: INTERNAL MEDICINE

## 2022-05-25 PROCEDURE — 3080F DIAST BP >= 90 MM HG: CPT | Mod: CPTII,S$GLB,, | Performed by: INTERNAL MEDICINE

## 2022-05-25 PROCEDURE — 3080F PR MOST RECENT DIASTOLIC BLOOD PRESSURE >= 90 MM HG: ICD-10-PCS | Mod: CPTII,S$GLB,, | Performed by: INTERNAL MEDICINE

## 2022-05-25 PROCEDURE — 3066F PR DOCUMENTATION OF TREATMENT FOR NEPHROPATHY: ICD-10-PCS | Mod: CPTII,S$GLB,, | Performed by: INTERNAL MEDICINE

## 2022-05-25 PROCEDURE — 3077F SYST BP >= 140 MM HG: CPT | Mod: CPTII,S$GLB,, | Performed by: INTERNAL MEDICINE

## 2022-05-25 PROCEDURE — 1101F PR PT FALLS ASSESS DOC 0-1 FALLS W/OUT INJ PAST YR: ICD-10-PCS | Mod: CPTII,S$GLB,, | Performed by: INTERNAL MEDICINE

## 2022-05-25 PROCEDURE — 1126F AMNT PAIN NOTED NONE PRSNT: CPT | Mod: CPTII,S$GLB,, | Performed by: INTERNAL MEDICINE

## 2022-05-25 PROCEDURE — 3288F PR FALLS RISK ASSESSMENT DOCUMENTED: ICD-10-PCS | Mod: CPTII,S$GLB,, | Performed by: INTERNAL MEDICINE

## 2022-05-25 PROCEDURE — 1101F PT FALLS ASSESS-DOCD LE1/YR: CPT | Mod: CPTII,S$GLB,, | Performed by: INTERNAL MEDICINE

## 2022-05-25 PROCEDURE — 3066F NEPHROPATHY DOC TX: CPT | Mod: CPTII,S$GLB,, | Performed by: INTERNAL MEDICINE

## 2022-05-25 PROCEDURE — 1159F MED LIST DOCD IN RCRD: CPT | Mod: CPTII,S$GLB,, | Performed by: INTERNAL MEDICINE

## 2022-05-25 PROCEDURE — 99999 PR PBB SHADOW E&M-EST. PATIENT-LVL IV: ICD-10-PCS | Mod: PBBFAC,,, | Performed by: INTERNAL MEDICINE

## 2022-05-25 RX ORDER — LABETALOL 300 MG/1
300 TABLET, FILM COATED ORAL 3 TIMES DAILY
Qty: 90 TABLET | Refills: 11 | Status: SHIPPED | OUTPATIENT
Start: 2022-05-25 | End: 2022-08-01

## 2022-05-25 RX ORDER — LABETALOL 300 MG/1
300 TABLET, FILM COATED ORAL 3 TIMES DAILY
Qty: 90 TABLET | Refills: 11 | Status: SHIPPED | OUTPATIENT
Start: 2022-05-25 | End: 2022-05-25

## 2022-05-25 NOTE — PROGRESS NOTES
[facility] Progress Note      HPI  Jesi Cha is a 71 y.o. female  has a past medical history of malignant hypertension, CKD IIIb, Hypokalemia, Obesity, and Thyroid cyst. She presents to office for a 3 week follow up visit for blood pressure check and renal function FU. Her BP remains high 150/98. She reports she checks it at home and at [facility] 130s/100s. Her Creatinine as increased to 2.98. She denies  visual disturbances.occasional headaches    Patient denies taking NSAIDs, new antibiotics or recreational drugs. Denies recent episode of dehydration, diarrhea, vomiting, acute illness, hospitalization, recent angiograms or exposure to IV radiocontrast.      Medical History:   Past Medical History:   Diagnosis Date    HTN (hypertension)     Hypokalemia     Obesity, unspecified     Thyroid cyst        Surgical History:   Past Surgical History:   Procedure Laterality Date    APPENDECTOMY       SECTION      CHOLECYSTECTOMY         Family History:   Family History   Problem Relation Age of Onset    Arthritis Mother        Social History:   Social History     Tobacco Use    Smoking status: Never Smoker    Smokeless tobacco: Never Used   Substance Use Topics    Alcohol use: Never       Allergies:  Review of patient's allergies indicates:   Allergen Reactions    Codeine        Medications:    Current Outpatient Medications:     aspirin 81 mg Cap, Take 81 mg by mouth once daily at 6am., Disp: , Rfl:     atorvastatin (LIPITOR) 40 MG tablet, Take 40 mg by mouth once daily., Disp: , Rfl:     calcitRIOL (ROCALTROL) 0.25 MCG Cap, Take 0.25 mcg by mouth once daily., Disp: , Rfl:     cloNIDine 0.3 mg/24 hr td ptwk (CATAPRES) 0.3 mg/24 hr, Place 1 patch onto the skin every 7 days., Disp: 4 patch, Rfl: 11    diclofenac sodium (VOLTAREN) 1 % Gel, APPLY 4 GRAMS TOPICALLY 4 TIMES DAILY AS NEEDED FOR PAIN APPLY A THIN FILM TO EACH KNEE AS NEEDED FOR ARTHRITIS PAIN. NOT TO EXCEED 16  "GRAMS/DAY/SINGLE JOINT OF LOWER EXTREMITIES., Disp: , Rfl:     fluticasone propionate (FLONASE) 50 mcg/actuation nasal spray, USE 1 TO 2 SPRAY(S) IN EACH NOSTRIL TWICE DAILY FOR ALLERGY SYMPTOMS, Disp: , Rfl:     hydrALAZINE (APRESOLINE) 50 MG tablet, Take 50 mg by mouth 3 (three) times daily., Disp: , Rfl:     losartan (COZAAR) 50 MG tablet, Take 50 mg by mouth 2 (two) times daily., Disp: , Rfl:     metoprolol succinate (TOPROL-XL) 25 MG 24 hr tablet, Take 25 mg by mouth 2 (two) times daily., Disp: , Rfl:     NIFEdipine (PROCARDIA-XL) 60 MG (OSM) 24 hr tablet, Take 60 mg by mouth 2 (two) times a day., Disp: , Rfl:     nitroGLYCERIN (NITROSTAT) 0.4 MG SL tablet, Place 0.4 mg under the tongue., Disp: , Rfl:     spironolactone (ALDACTONE) 25 MG tablet, Take 1 tablet (25 mg total) by mouth once daily., Disp: 30 tablet, Rfl: 11       ROS:    Constitutional: Denies  fever, fatigue, generalized weakness, night sweats, or acute weight change  Skin: Denies wounds, no rashes, no itching, no new skin lesions  HEENT: Denies acute change in hearing or vision, tinnitus, or dysphagia  Respiratory:  Denies cough, shortness of breath, or wheezing  Cardiovascular: Denies chest pain, palpitations, or swelling  Gastrointestional: Denies abdominal pain, nausea, vomiting, diarrhea, or constipation  Genitourinary: Denies dysuria, hematuria, or incontinence; reports able to empty bladder  Musculoskeletal: Denies myalgias, back pain, decreased ROM or focal weakness  Neurological: occasional headaches,no seizures, dizziness, paresthesias or weakness  Hematological: Denies unusual bruising or bleeding  Psychiatric: Denies hallucinations, depression, or confusion      Vital Signs:  BP (!) 150/110 (BP Location: Left arm, Patient Position: Sitting)   Pulse 80   Temp 98.4 °F (36.9 °C) (Oral)   Resp 20   Ht 5' 3" (1.6 m)   Wt 99 kg (218 lb 4.1 oz)   SpO2 97%   BMI 38.66 kg/m²   Body mass index is 38.66 kg/m².      Physical " Exam:    General: no acute distress, awake, alert  Eyes: PERRLA, EOMI, conjunctiva clear, eyelids without swelling  HENT: atraumatic, oropharynx and nasal mucosa patent  Neck: full ROM, no JVD, no thyromegaly or lymphadenopathy  Respiratory: equal, unlabored, clear to auscultation A/P  Cardiovascular: RRR without murmur or rub; radial and pedal pulses felt  Edema: none  Gastrointestinal: soft, non-tender, non-distended; positive bowel sounds; no masses to palpation  Genitourinary: no CVA tenderness upon palpation  Musculoskeletal: full ROM without limitation or discomfort  Integumentary: warm, dry; no rashes, wounds, or skin lesions  Neurological: oriented, appropriate, no acute deficits      Labs:        Component Value Date/Time     05/18/2022 1123     (H) 05/04/2022 0919    K 4.7 05/18/2022 1123    K 3.7 05/04/2022 0919    CO2 27 05/18/2022 1123    CO2 26 05/04/2022 0919    BUN 46.3 (H) 05/18/2022 1123    BUN 46.8 (H) 05/04/2022 0919    CREATININE 2.95 (H) 05/18/2022 1123    CREATININE 2.56 (H) 05/04/2022 0919    CALCIUM 10.4 (H) 05/18/2022 1123    CALCIUM 9.8 05/04/2022 0919    PHOS 3.7 04/01/2022 0919            Component Value Date/Time    WBC 5.8 04/01/2022 0919    WBC 6.3 12/08/2021 0915    HGB 12.9 04/01/2022 0919    HGB 13.7 12/08/2021 0915    HCT 41.0 04/01/2022 0919    HCT 42.1 12/08/2021 0915     04/01/2022 0919     12/08/2021 0915           Impression:  1. Stage 3 chronic kidney disease, unspecified whether stage 3a or 3b CKD  CBC Auto Differential    Comprehensive Metabolic Panel    Protein, Random Urine    PTH, Intact    Urinalysis    Creatinine, Random Urine   2. Hypertension, unspecified type  CBC Auto Differential    Comprehensive Metabolic Panel    Protein, Random Urine    PTH, Intact    Urinalysis    Creatinine, Random Urine   3. Hypercalcemia  CBC Auto Differential    Comprehensive Metabolic Panel    Protein, Random Urine    PTH, Intact    Urinalysis    Creatinine,  Random Urine   4. Secondary hyperparathyroidism of renal origin             Cr elevation likely related to change in renal autoregulation related to HTN  And hemodynamic changes. Will continue monitoring closely for some improvement once BP stabilizes, RO effect of Angiotensin receptor antagonists.  Hypercalcemia noted, related to calcitriol  Increase in potassium from 3.7 to 4.7 since starting aldactone    Plan:  DC metoprolol  Rx Labetolol 300 mg BID    Decrease calcitriol to every other day instead of daily    Decrease Cozaar from twice a day to daily     Low potassium diet    F/U in 3 weeks, labs in 2 weeks    Annabel Clemons

## 2022-06-10 ENCOUNTER — LAB VISIT (OUTPATIENT)
Dept: LAB | Facility: HOSPITAL | Age: 72
End: 2022-06-10
Attending: INTERNAL MEDICINE
Payer: MEDICARE

## 2022-06-10 DIAGNOSIS — N18.30 STAGE 3 CHRONIC KIDNEY DISEASE, UNSPECIFIED WHETHER STAGE 3A OR 3B CKD: ICD-10-CM

## 2022-06-10 DIAGNOSIS — E83.52 HYPERCALCEMIA: ICD-10-CM

## 2022-06-10 DIAGNOSIS — I10 HYPERTENSION, UNSPECIFIED TYPE: ICD-10-CM

## 2022-06-10 LAB
APPEARANCE UR: CLEAR
BACTERIA #/AREA URNS AUTO: ABNORMAL /HPF
BILIRUB UR QL STRIP.AUTO: NEGATIVE MG/DL
COLOR UR AUTO: YELLOW
CREAT UR-MCNC: 35.2 MG/DL (ref 47–110)
GLUCOSE UR QL STRIP.AUTO: NEGATIVE MG/DL
KETONES UR QL STRIP.AUTO: NEGATIVE MG/DL
LEUKOCYTE ESTERASE UR QL STRIP.AUTO: NEGATIVE UNIT/L
NITRITE UR QL STRIP.AUTO: NEGATIVE
PH UR STRIP.AUTO: 5.5 [PH]
PROT UR QL STRIP.AUTO: ABNORMAL MG/DL
PROT UR STRIP-MCNC: 19.8 MG/DL
RBC #/AREA URNS AUTO: ABNORMAL /HPF
RBC UR QL AUTO: NEGATIVE UNIT/L
SP GR UR STRIP.AUTO: 1.02
SQUAMOUS #/AREA URNS AUTO: ABNORMAL /LPF
UROBILINOGEN UR STRIP-ACNC: 0.2 MG/DL
WBC #/AREA URNS AUTO: ABNORMAL /HPF

## 2022-06-10 PROCEDURE — 81001 URINALYSIS AUTO W/SCOPE: CPT

## 2022-06-10 PROCEDURE — 82042 OTHER SOURCE ALBUMIN QUAN EA: CPT

## 2022-06-10 PROCEDURE — 82570 ASSAY OF URINE CREATININE: CPT

## 2022-06-13 ENCOUNTER — OFFICE VISIT (OUTPATIENT)
Dept: PRIMARY CARE CLINIC | Facility: CLINIC | Age: 72
End: 2022-06-13
Payer: MEDICARE

## 2022-06-13 VITALS
HEART RATE: 97 BPM | BODY MASS INDEX: 37.91 KG/M2 | RESPIRATION RATE: 20 BRPM | SYSTOLIC BLOOD PRESSURE: 160 MMHG | DIASTOLIC BLOOD PRESSURE: 106 MMHG | WEIGHT: 214 LBS | TEMPERATURE: 99 F

## 2022-06-13 DIAGNOSIS — N18.30 STAGE 3 CHRONIC KIDNEY DISEASE, UNSPECIFIED WHETHER STAGE 3A OR 3B CKD: ICD-10-CM

## 2022-06-13 DIAGNOSIS — I10 PRIMARY HYPERTENSION: ICD-10-CM

## 2022-06-13 DIAGNOSIS — L03.115 CELLULITIS OF RIGHT LOWER EXTREMITY: Primary | ICD-10-CM

## 2022-06-13 PROBLEM — E66.09 CLASS 2 OBESITY DUE TO EXCESS CALORIES WITH BODY MASS INDEX (BMI) OF 37.0 TO 37.9 IN ADULT: Status: ACTIVE | Noted: 2021-10-28

## 2022-06-13 PROBLEM — E87.6 HYPOKALEMIA: Status: ACTIVE | Noted: 2022-06-13

## 2022-06-13 PROBLEM — U07.1 COVID-19: Status: ACTIVE | Noted: 2021-10-03

## 2022-06-13 PROBLEM — E66.812 CLASS 2 OBESITY DUE TO EXCESS CALORIES WITH BODY MASS INDEX (BMI) OF 37.0 TO 37.9 IN ADULT: Status: ACTIVE | Noted: 2021-10-28

## 2022-06-13 PROBLEM — E04.1 CYST OF THYROID: Status: ACTIVE | Noted: 2022-06-13

## 2022-06-13 PROCEDURE — 3080F DIAST BP >= 90 MM HG: CPT | Mod: CPTII,,, | Performed by: STUDENT IN AN ORGANIZED HEALTH CARE EDUCATION/TRAINING PROGRAM

## 2022-06-13 PROCEDURE — 3077F SYST BP >= 140 MM HG: CPT | Mod: CPTII,,, | Performed by: STUDENT IN AN ORGANIZED HEALTH CARE EDUCATION/TRAINING PROGRAM

## 2022-06-13 PROCEDURE — 3288F FALL RISK ASSESSMENT DOCD: CPT | Mod: CPTII,,, | Performed by: STUDENT IN AN ORGANIZED HEALTH CARE EDUCATION/TRAINING PROGRAM

## 2022-06-13 PROCEDURE — 4010F PR ACE/ARB THEARPY RXD/TAKEN: ICD-10-PCS | Mod: CPTII,,, | Performed by: STUDENT IN AN ORGANIZED HEALTH CARE EDUCATION/TRAINING PROGRAM

## 2022-06-13 PROCEDURE — 1160F PR REVIEW ALL MEDS BY PRESCRIBER/CLIN PHARMACIST DOCUMENTED: ICD-10-PCS | Mod: CPTII,,, | Performed by: STUDENT IN AN ORGANIZED HEALTH CARE EDUCATION/TRAINING PROGRAM

## 2022-06-13 PROCEDURE — 3288F PR FALLS RISK ASSESSMENT DOCUMENTED: ICD-10-PCS | Mod: CPTII,,, | Performed by: STUDENT IN AN ORGANIZED HEALTH CARE EDUCATION/TRAINING PROGRAM

## 2022-06-13 PROCEDURE — 99214 OFFICE O/P EST MOD 30 MIN: CPT | Mod: ,,, | Performed by: STUDENT IN AN ORGANIZED HEALTH CARE EDUCATION/TRAINING PROGRAM

## 2022-06-13 PROCEDURE — 1159F MED LIST DOCD IN RCRD: CPT | Mod: CPTII,,, | Performed by: STUDENT IN AN ORGANIZED HEALTH CARE EDUCATION/TRAINING PROGRAM

## 2022-06-13 PROCEDURE — 3080F PR MOST RECENT DIASTOLIC BLOOD PRESSURE >= 90 MM HG: ICD-10-PCS | Mod: CPTII,,, | Performed by: STUDENT IN AN ORGANIZED HEALTH CARE EDUCATION/TRAINING PROGRAM

## 2022-06-13 PROCEDURE — 3066F NEPHROPATHY DOC TX: CPT | Mod: CPTII,,, | Performed by: STUDENT IN AN ORGANIZED HEALTH CARE EDUCATION/TRAINING PROGRAM

## 2022-06-13 PROCEDURE — 1101F PT FALLS ASSESS-DOCD LE1/YR: CPT | Mod: CPTII,,, | Performed by: STUDENT IN AN ORGANIZED HEALTH CARE EDUCATION/TRAINING PROGRAM

## 2022-06-13 PROCEDURE — 1159F PR MEDICATION LIST DOCUMENTED IN MEDICAL RECORD: ICD-10-PCS | Mod: CPTII,,, | Performed by: STUDENT IN AN ORGANIZED HEALTH CARE EDUCATION/TRAINING PROGRAM

## 2022-06-13 PROCEDURE — 3066F PR DOCUMENTATION OF TREATMENT FOR NEPHROPATHY: ICD-10-PCS | Mod: CPTII,,, | Performed by: STUDENT IN AN ORGANIZED HEALTH CARE EDUCATION/TRAINING PROGRAM

## 2022-06-13 PROCEDURE — 3008F PR BODY MASS INDEX (BMI) DOCUMENTED: ICD-10-PCS | Mod: CPTII,,, | Performed by: STUDENT IN AN ORGANIZED HEALTH CARE EDUCATION/TRAINING PROGRAM

## 2022-06-13 PROCEDURE — 99214 PR OFFICE/OUTPT VISIT, EST, LEVL IV, 30-39 MIN: ICD-10-PCS | Mod: ,,, | Performed by: STUDENT IN AN ORGANIZED HEALTH CARE EDUCATION/TRAINING PROGRAM

## 2022-06-13 PROCEDURE — 1101F PR PT FALLS ASSESS DOC 0-1 FALLS W/OUT INJ PAST YR: ICD-10-PCS | Mod: CPTII,,, | Performed by: STUDENT IN AN ORGANIZED HEALTH CARE EDUCATION/TRAINING PROGRAM

## 2022-06-13 PROCEDURE — 1160F RVW MEDS BY RX/DR IN RCRD: CPT | Mod: CPTII,,, | Performed by: STUDENT IN AN ORGANIZED HEALTH CARE EDUCATION/TRAINING PROGRAM

## 2022-06-13 PROCEDURE — 4010F ACE/ARB THERAPY RXD/TAKEN: CPT | Mod: CPTII,,, | Performed by: STUDENT IN AN ORGANIZED HEALTH CARE EDUCATION/TRAINING PROGRAM

## 2022-06-13 PROCEDURE — 3077F PR MOST RECENT SYSTOLIC BLOOD PRESSURE >= 140 MM HG: ICD-10-PCS | Mod: CPTII,,, | Performed by: STUDENT IN AN ORGANIZED HEALTH CARE EDUCATION/TRAINING PROGRAM

## 2022-06-13 PROCEDURE — 3008F BODY MASS INDEX DOCD: CPT | Mod: CPTII,,, | Performed by: STUDENT IN AN ORGANIZED HEALTH CARE EDUCATION/TRAINING PROGRAM

## 2022-06-13 RX ORDER — DOXYCYCLINE HYCLATE 100 MG
100 TABLET ORAL 2 TIMES DAILY
Qty: 14 TABLET | Refills: 0 | Status: SHIPPED | OUTPATIENT
Start: 2022-06-13 | End: 2022-09-19

## 2022-06-13 NOTE — PROGRESS NOTES
Subjective:       Patient ID: Jesi Cha is a 71 y.o. female.    Past Medical History:  HTN (hypertension)  Hypokalemia  Obesity, unspecified  Thyroid cyst     Chief Complaint: 3 Month f/u (Open cut to left lower shin, discuss imaging results )    Pt presents for routine f/u. C/o wound to R lower leg. Pt says she scraped it on a car door and it's not been healing well. Has pain at the site with some dark discoloration around the wound. No fevers, chills, purulent discharge, etc. Has been putting neosporin on the area.     Discussed her BP today. She's not compliant with meds. Not taking hydralazine lately, reason unclear. Also been off clonidine patch for >1 week, says she ran out from using her script too fast. Says patches get wet and fall off or become itchy so she takes them off. Lastly, she stopped taking labetalol. Pt felt like her skin was burning and like her skin was sensitive to the sun. She presumed it to be related to the labetalol, so she stopped it.        Review of Systems   Constitutional: Negative for chills, fatigue, fever and unexpected weight change.   HENT: Negative for nasal congestion, hearing loss, sinus pressure/congestion and sore throat.    Eyes: Negative for pain, redness and visual disturbance.   Respiratory: Negative for cough, shortness of breath and wheezing.    Cardiovascular: Negative for chest pain, palpitations and leg swelling.   Gastrointestinal: Negative for abdominal pain, change in bowel habit, diarrhea, nausea, vomiting and change in bowel habit.   Endocrine: Negative for cold intolerance, heat intolerance, polydipsia and polyuria.   Genitourinary: Negative for dysuria, frequency, hematuria and urgency.   Musculoskeletal: Negative for arthralgias, joint swelling and myalgias.   Integumentary:  Positive for color change and wound.   Neurological: Negative for dizziness, syncope, weakness, numbness and headaches.   Hematological: Negative for adenopathy. Does not  bruise/bleed easily.   Psychiatric/Behavioral: Negative for confusion. The patient is not nervous/anxious.            Objective:      Physical Exam  Vitals and nursing note reviewed.   Constitutional:       General: She is not in acute distress.     Appearance: She is not ill-appearing.   HENT:      Nose: No congestion or rhinorrhea.      Mouth/Throat:      Mouth: Mucous membranes are moist.      Pharynx: No oropharyngeal exudate or posterior oropharyngeal erythema.   Eyes:      Extraocular Movements: Extraocular movements intact.      Conjunctiva/sclera: Conjunctivae normal.      Pupils: Pupils are equal, round, and reactive to light.   Cardiovascular:      Rate and Rhythm: Normal rate and regular rhythm.      Pulses: Normal pulses.      Heart sounds: Murmur (grade 3/6 systolic murmur loudest at R 2nd ICS) heard.   Pulmonary:      Effort: Pulmonary effort is normal.      Breath sounds: Normal breath sounds.   Abdominal:      Palpations: Abdomen is soft. There is no mass.      Tenderness: There is no abdominal tenderness.   Musculoskeletal:         General: No deformity or signs of injury.      Cervical back: Neck supple.      Right lower leg: Right lower leg edema: trace.      Left lower leg: Left lower leg edema: trace.   Lymphadenopathy:      Cervical: No cervical adenopathy.   Skin:     General: Skin is warm and dry.      Findings: No rash.   Neurological:      General: No focal deficit present.      Mental Status: She is alert. Mental status is at baseline.   Psychiatric:         Mood and Affect: Mood normal.         Behavior: Behavior normal.           Assessment & Plan:   1. Cellulitis of right lower extremity  Assessment & Plan:  RLE wound looks mildly infected, some questionable purulence in middle of wound, no drainable collection. Instructed to stop neosporin. Will send Bactroban. Also prescribing 7 d course of doxycycline 100 mg BID. Will call pt next week to check on status of wound and determine need  for f/u or further treatment. ED precautions given    Orders:  -     doxycycline (VIBRA-TABS) 100 MG tablet  -     mupirocin (BACTROBAN) 2 % ointment    2. Primary hypertension  Assessment & Plan:  Not at goal. Non-compliance.  Pt hasn't uses clonidine patch in >1 week. Reports itching from patch. Some fell off as well, so she used up Rx too quickly. She stopped labetalol as well (skin burning?).  Also admits to non-compliance with hydralazine.     Since she stopped clonidine and labetalol, she should be on the following at this time:  Hydralazine 50 mg TID  Nifedipine 60 mg BID  Spironolactone 25 mg daily  Losartan 50 mg daily    We discussed the importance of compliance. She will take these meds as prescribed (including hydralazine). She has f/u with [facility] on 6/15 where her BP can be checked. May need to restart clonidine patch if patient can tolerate.    Counseled extensively on risk of uncontrolled HTN including renal failure, heart failure, etc.      3. Stage 3 chronic kidney disease, unspecified whether stage 3a or 3b CKD          F/u in 3 mo for wellness  Will call next week to check on cellulitis

## 2022-06-13 NOTE — ASSESSMENT & PLAN NOTE
Not at goal. Non-compliance.  Pt hasn't uses clonidine patch in >1 week. Reports itching from patch. Some fell off as well, so she used up Rx too quickly. She stopped labetalol as well (skin burning?).  Also admits to non-compliance with hydralazine.     Since she stopped clonidine and labetalol, she should be on the following at this time:  Hydralazine 50 mg TID  Nifedipine 60 mg BID  Spironolactone 25 mg daily  Losartan 50 mg daily    We discussed the importance of compliance. She will take these meds as prescribed (including hydralazine). She has f/u with [facility] on 6/15 where her BP can be checked. May need to restart clonidine patch if patient can tolerate.    Counseled extensively on risk of uncontrolled HTN including renal failure, heart failure, etc.

## 2022-06-14 PROBLEM — I34.0 MITRAL REGURGITATION: Status: ACTIVE | Noted: 2022-06-14

## 2022-06-14 PROBLEM — L03.115 CELLULITIS OF RIGHT LOWER EXTREMITY: Status: ACTIVE | Noted: 2022-06-14

## 2022-06-14 PROBLEM — I35.1 MILD AORTIC REGURGITATION: Status: ACTIVE | Noted: 2022-06-14

## 2022-06-14 RX ORDER — MUPIROCIN 20 MG/G
OINTMENT TOPICAL 2 TIMES DAILY
Qty: 15 G | Refills: 0 | Status: SHIPPED | OUTPATIENT
Start: 2022-06-14 | End: 2023-06-16

## 2022-06-14 NOTE — ASSESSMENT & PLAN NOTE
RLE wound looks mildly infected, some questionable purulence in middle of wound, no drainable collection. Instructed to stop neosporin. Will send Bactroban. Also prescribing 7 d course of doxycycline 100 mg BID. Will call pt next week to check on status of wound and determine need for f/u or further treatment. ED precautions given

## 2022-06-15 ENCOUNTER — OFFICE VISIT (OUTPATIENT)
Dept: NEPHROLOGY | Facility: CLINIC | Age: 72
End: 2022-06-15
Payer: MEDICARE

## 2022-06-15 VITALS
DIASTOLIC BLOOD PRESSURE: 98 MMHG | HEART RATE: 104 BPM | OXYGEN SATURATION: 95 % | TEMPERATURE: 98 F | BODY MASS INDEX: 38.52 KG/M2 | RESPIRATION RATE: 20 BRPM | SYSTOLIC BLOOD PRESSURE: 140 MMHG | HEIGHT: 63 IN | WEIGHT: 217.38 LBS

## 2022-06-15 DIAGNOSIS — N18.30 STAGE 3 CHRONIC KIDNEY DISEASE, UNSPECIFIED WHETHER STAGE 3A OR 3B CKD: ICD-10-CM

## 2022-06-15 DIAGNOSIS — I10 HYPERTENSION, UNSPECIFIED TYPE: Primary | ICD-10-CM

## 2022-06-15 PROCEDURE — 3066F PR DOCUMENTATION OF TREATMENT FOR NEPHROPATHY: ICD-10-PCS | Mod: CPTII,S$GLB,, | Performed by: INTERNAL MEDICINE

## 2022-06-15 PROCEDURE — 99999 PR PBB SHADOW E&M-EST. PATIENT-LVL IV: ICD-10-PCS | Mod: PBBFAC,,, | Performed by: INTERNAL MEDICINE

## 2022-06-15 PROCEDURE — 99999 PR PBB SHADOW E&M-EST. PATIENT-LVL IV: CPT | Mod: PBBFAC,,, | Performed by: INTERNAL MEDICINE

## 2022-06-15 PROCEDURE — 3066F NEPHROPATHY DOC TX: CPT | Mod: CPTII,S$GLB,, | Performed by: INTERNAL MEDICINE

## 2022-06-15 PROCEDURE — 1126F AMNT PAIN NOTED NONE PRSNT: CPT | Mod: CPTII,S$GLB,, | Performed by: INTERNAL MEDICINE

## 2022-06-15 PROCEDURE — 99214 OFFICE O/P EST MOD 30 MIN: CPT | Mod: S$GLB,,, | Performed by: INTERNAL MEDICINE

## 2022-06-15 PROCEDURE — 99214 PR OFFICE/OUTPT VISIT, EST, LEVL IV, 30-39 MIN: ICD-10-PCS | Mod: S$GLB,,, | Performed by: INTERNAL MEDICINE

## 2022-06-15 PROCEDURE — 3077F PR MOST RECENT SYSTOLIC BLOOD PRESSURE >= 140 MM HG: ICD-10-PCS | Mod: CPTII,S$GLB,, | Performed by: INTERNAL MEDICINE

## 2022-06-15 PROCEDURE — 3077F SYST BP >= 140 MM HG: CPT | Mod: CPTII,S$GLB,, | Performed by: INTERNAL MEDICINE

## 2022-06-15 PROCEDURE — 1101F PR PT FALLS ASSESS DOC 0-1 FALLS W/OUT INJ PAST YR: ICD-10-PCS | Mod: CPTII,S$GLB,, | Performed by: INTERNAL MEDICINE

## 2022-06-15 PROCEDURE — 3288F FALL RISK ASSESSMENT DOCD: CPT | Mod: CPTII,S$GLB,, | Performed by: INTERNAL MEDICINE

## 2022-06-15 PROCEDURE — 4010F ACE/ARB THERAPY RXD/TAKEN: CPT | Mod: CPTII,S$GLB,, | Performed by: INTERNAL MEDICINE

## 2022-06-15 PROCEDURE — 3080F PR MOST RECENT DIASTOLIC BLOOD PRESSURE >= 90 MM HG: ICD-10-PCS | Mod: CPTII,S$GLB,, | Performed by: INTERNAL MEDICINE

## 2022-06-15 PROCEDURE — 3288F PR FALLS RISK ASSESSMENT DOCUMENTED: ICD-10-PCS | Mod: CPTII,S$GLB,, | Performed by: INTERNAL MEDICINE

## 2022-06-15 PROCEDURE — 1159F MED LIST DOCD IN RCRD: CPT | Mod: CPTII,S$GLB,, | Performed by: INTERNAL MEDICINE

## 2022-06-15 PROCEDURE — 3008F BODY MASS INDEX DOCD: CPT | Mod: CPTII,S$GLB,, | Performed by: INTERNAL MEDICINE

## 2022-06-15 PROCEDURE — 3080F DIAST BP >= 90 MM HG: CPT | Mod: CPTII,S$GLB,, | Performed by: INTERNAL MEDICINE

## 2022-06-15 PROCEDURE — 4010F PR ACE/ARB THEARPY RXD/TAKEN: ICD-10-PCS | Mod: CPTII,S$GLB,, | Performed by: INTERNAL MEDICINE

## 2022-06-15 PROCEDURE — 1101F PT FALLS ASSESS-DOCD LE1/YR: CPT | Mod: CPTII,S$GLB,, | Performed by: INTERNAL MEDICINE

## 2022-06-15 PROCEDURE — 1159F PR MEDICATION LIST DOCUMENTED IN MEDICAL RECORD: ICD-10-PCS | Mod: CPTII,S$GLB,, | Performed by: INTERNAL MEDICINE

## 2022-06-15 PROCEDURE — 1126F PR PAIN SEVERITY QUANTIFIED, NO PAIN PRESENT: ICD-10-PCS | Mod: CPTII,S$GLB,, | Performed by: INTERNAL MEDICINE

## 2022-06-15 PROCEDURE — 3008F PR BODY MASS INDEX (BMI) DOCUMENTED: ICD-10-PCS | Mod: CPTII,S$GLB,, | Performed by: INTERNAL MEDICINE

## 2022-06-15 NOTE — PROGRESS NOTES
"[facility] Ambulatory Progress Note      HPI  Jesi Cha, 71 y.o. female,  has a past medical history of HTN (hypertension), Hypokalemia, Obesity, unspecified, and Thyroid cyst.. Jesi Cha presents to office for a follow up visit for CKD 3, and uncontrolled HTN  Pt trying to lose some weight.  Her blood pressure is better controlled at home since on clonidine patch.  Admitted to not taking labetalol as prescribed due " tingling in the head"  Patient ran out of clonidine patch and admitted to not taking it for the past few days    Patient denies taking NSAIDs, new antibiotics or recreational drugs. Denies recent episode of dehydration, diarrhea, vomiting, acute illness, hospitalization, recent angiograms or exposure to IV radiocontrast.      Medical History:   Past Medical History:   Diagnosis Date    HTN (hypertension)     Hypokalemia     Obesity, unspecified     Thyroid cyst        Surgical History:   Past Surgical History:   Procedure Laterality Date    APPENDECTOMY       SECTION      CHOLECYSTECTOMY         Family History:   Family History   Problem Relation Age of Onset    Arthritis Mother        Social History:   Social History     Tobacco Use    Smoking status: Never Smoker    Smokeless tobacco: Never Used   Substance Use Topics    Alcohol use: Never       Allergies:  Review of patient's allergies indicates:   Allergen Reactions    Codeine        Medications:    Current Outpatient Medications:     aspirin 81 mg Cap, Take 81 mg by mouth once daily at 6am., Disp: , Rfl:     atorvastatin (LIPITOR) 40 MG tablet, Take 40 mg by mouth once daily., Disp: , Rfl:     calcitRIOL (ROCALTROL) 0.25 MCG Cap, Take 0.25 mcg by mouth every other day., Disp: , Rfl:     diclofenac sodium (VOLTAREN) 1 % Gel, APPLY 4 GRAMS TOPICALLY 4 TIMES DAILY AS NEEDED FOR PAIN APPLY A THIN FILM TO EACH KNEE AS NEEDED FOR ARTHRITIS PAIN. NOT TO EXCEED 16 GRAMS/DAY/SINGLE JOINT OF LOWER EXTREMITIES., " Disp: , Rfl:     fluticasone propionate (FLONASE) 50 mcg/actuation nasal spray, USE 1 TO 2 SPRAY(S) IN EACH NOSTRIL TWICE DAILY FOR ALLERGY SYMPTOMS, Disp: , Rfl:     hydrALAZINE (APRESOLINE) 50 MG tablet, Take 50 mg by mouth 3 (three) times daily., Disp: , Rfl:     losartan (COZAAR) 50 MG tablet, Take 50 mg by mouth once daily at 6am., Disp: , Rfl:     mupirocin (BACTROBAN) 2 % ointment, Apply topically 2 (two) times daily. Apply thin film to affected area of leg, Disp: 15 g, Rfl: 0    NIFEdipine (PROCARDIA-XL) 60 MG (OSM) 24 hr tablet, Take 60 mg by mouth 2 (two) times a day., Disp: , Rfl:     nitroGLYCERIN (NITROSTAT) 0.4 MG SL tablet, Place 0.4 mg under the tongue., Disp: , Rfl:     spironolactone (ALDACTONE) 25 MG tablet, Take 1 tablet (25 mg total) by mouth once daily., Disp: 30 tablet, Rfl: 11    cloNIDine 0.3 mg/24 hr td ptwk (CATAPRES) 0.3 mg/24 hr, Place 1 patch onto the skin every 7 days. (Patient not taking: No sig reported), Disp: 4 patch, Rfl: 11    doxycycline (VIBRA-TABS) 100 MG tablet, Take 1 tablet (100 mg total) by mouth 2 (two) times daily. (Patient not taking: Reported on 6/15/2022), Disp: 14 tablet, Rfl: 0    labetaloL (NORMODYNE) 300 MG tablet, Take 1 tablet (300 mg total) by mouth 3 (three) times daily. (Patient not taking: No sig reported), Disp: 90 tablet, Rfl: 11       ROS:    Constitutional: Denies fever, fatigue, generalized weakness, night sweats, or acute weight change  Skin: Denies wounds, no rashes, no itching, no new skin lesions  HEENT: Denies acute change in hearing or vision, tinnitus, or dysphagia  Respiratory:  Denies cough, shortness of breath, or wheezing  Cardiovascular: Denies chest pain, palpitations, or swelling  Gastrointestional: Denies abdominal pain, nausea, vomiting, diarrhea, or constipation  Genitourinary: Denies dysuria, hematuria, or incontinence; reports able to empty bladder  Musculoskeletal: Denies myalgias, back pain, decreased ROM or focal  "weakness, has a skin wound in the right lower extremity on oral antibiotics per primary care physician  Neurological: Denies headaches, seizures, dizziness, paresthesias or weakness  Hematological: Denies unusual bruising or bleeding  Psychiatric: Denies hallucinations, depression, or confusion      Vital Signs:  BP (!) 140/98 (BP Location: Left arm, Patient Position: Sitting)   Pulse 104   Temp 97.9 °F (36.6 °C) (Oral)   Resp 20   Ht 5' 3" (1.6 m)   Wt 98.6 kg (217 lb 6 oz)   SpO2 95%   BMI 38.51 kg/m²   Body mass index is 38.51 kg/m².      Physical Exam:    General: no acute distress, awake, alert  Eyes: PERRLA, EOMI, conjunctiva clear, eyelids without swelling  HENT: atraumatic, oropharynx and nasal mucosa patent  Neck: full ROM, no JVD, no thyromegaly or lymphadenopathy  Respiratory: equal, unlabored, clear to auscultation A/P  Cardiovascular: RRR without  rub; radial and pedal pulses felt  Edema:  Trace lower extremities  Gastrointestinal: soft, non-tender, non-distended; positive bowel sounds; no masses to palpation  Genitourinary: no CVA tenderness upon palpation  Musculoskeletal: full ROM without limitation or discomfort, dressing in the right lower extremity lateral area  Integumentary: warm, dry; no rashes,   Neurological: oriented, appropriate, no acute deficits      Labs:        Component Value Date/Time     06/10/2022 1543     05/18/2022 1123    K 3.9 06/10/2022 1543    K 4.7 05/18/2022 1123    CO2 23 06/10/2022 1543    CO2 27 05/18/2022 1123    BUN 42.3 (H) 06/10/2022 1543    BUN 46.3 (H) 05/18/2022 1123    CREATININE 2.66 (H) 06/10/2022 1543    CREATININE 2.95 (H) 05/18/2022 1123    CALCIUM 9.9 06/10/2022 1543    CALCIUM 10.4 (H) 05/18/2022 1123    PHOS 3.7 04/01/2022 0919            Component Value Date/Time    WBC 6.4 06/10/2022 1543    WBC 5.8 04/01/2022 0919    HGB 13.4 06/10/2022 1543    HGB 12.9 04/01/2022 0919    HCT 41.8 06/10/2022 1543    HCT 41.0 04/01/2022 0919     " 06/10/2022 1543     04/01/2022 0919       @[email or web address](coloru,clarityu,specgrav,phur,proteinua,glucoseu,bilirubincon,bloodu,wbcu,rbcu,bacteria,mucus,nitrite,leukocytesur,urobilinogen,hyalinecasts)@      Impression:    Patient Active Problem List   Diagnosis    Class 2 obesity due to excess calories with body mass index (BMI) of 37.0 to 37.9 in adult    COVID-19    Cyst of thyroid    Hypokalemia    Primary hypertension    Stage 3 chronic kidney disease    Cellulitis of right lower extremity    Mitral regurgitation    Mild aortic regurgitation     1. Hypertension, unspecified type     2. Stage 3 chronic kidney disease, unspecified whether stage 3a or 3b CKD       Renal function appears to be stabilizing with creatinine down to 2.6 and proteinuria decreasing  Blood pressure trending better    Plan:     Advised patient to resume her clonidine patch  Monitor blood pressure at home still on the high side can take half the dose of prescribed labetalol  Patient would like to be followed up with me in 6 weeks  In that regard I had ordered labs 1 week before next appointment    Annabel Clemons

## 2022-06-21 ENCOUNTER — TELEPHONE (OUTPATIENT)
Dept: PRIMARY CARE CLINIC | Facility: CLINIC | Age: 72
End: 2022-06-21
Payer: MEDICARE

## 2022-06-21 NOTE — TELEPHONE ENCOUNTER
----- Message from Karoline Pham sent at 6/21/2022  2:38 PM CDT -----  Regarding: Advice  Type:  Patient Requesting Referral    Who Called:Patient  Does the patient already have the specialty appointment scheduled?:  If yes, what is the date of that appointment?:  Referral to What Specialty:wound care  Reason for Referral:wound on leg, worse after the antibiotics   Does the patient want the referral with a specific physician?:  Is the specialist an [facility]?:  Patient Requesting a Response?:  Would the patient rather a call back or a response via [facility]?   Best Call Back Number:[phone]  Additional Information:

## 2022-06-22 ENCOUNTER — OFFICE VISIT (OUTPATIENT)
Dept: PRIMARY CARE CLINIC | Facility: CLINIC | Age: 72
End: 2022-06-22
Payer: MEDICARE

## 2022-06-22 VITALS
WEIGHT: 215 LBS | DIASTOLIC BLOOD PRESSURE: 80 MMHG | TEMPERATURE: 98 F | HEART RATE: 100 BPM | HEIGHT: 63 IN | SYSTOLIC BLOOD PRESSURE: 120 MMHG | RESPIRATION RATE: 18 BRPM | BODY MASS INDEX: 38.09 KG/M2 | OXYGEN SATURATION: 98 %

## 2022-06-22 DIAGNOSIS — L03.115 CELLULITIS OF RIGHT LOWER EXTREMITY: Primary | ICD-10-CM

## 2022-06-22 PROCEDURE — 3008F PR BODY MASS INDEX (BMI) DOCUMENTED: ICD-10-PCS | Mod: CPTII,,, | Performed by: STUDENT IN AN ORGANIZED HEALTH CARE EDUCATION/TRAINING PROGRAM

## 2022-06-22 PROCEDURE — 1160F PR REVIEW ALL MEDS BY PRESCRIBER/CLIN PHARMACIST DOCUMENTED: ICD-10-PCS | Mod: CPTII,,, | Performed by: STUDENT IN AN ORGANIZED HEALTH CARE EDUCATION/TRAINING PROGRAM

## 2022-06-22 PROCEDURE — 3079F PR MOST RECENT DIASTOLIC BLOOD PRESSURE 80-89 MM HG: ICD-10-PCS | Mod: CPTII,,, | Performed by: STUDENT IN AN ORGANIZED HEALTH CARE EDUCATION/TRAINING PROGRAM

## 2022-06-22 PROCEDURE — 99213 OFFICE O/P EST LOW 20 MIN: CPT | Mod: ,,, | Performed by: STUDENT IN AN ORGANIZED HEALTH CARE EDUCATION/TRAINING PROGRAM

## 2022-06-22 PROCEDURE — 1101F PR PT FALLS ASSESS DOC 0-1 FALLS W/OUT INJ PAST YR: ICD-10-PCS | Mod: CPTII,,, | Performed by: STUDENT IN AN ORGANIZED HEALTH CARE EDUCATION/TRAINING PROGRAM

## 2022-06-22 PROCEDURE — 3074F SYST BP LT 130 MM HG: CPT | Mod: CPTII,,, | Performed by: STUDENT IN AN ORGANIZED HEALTH CARE EDUCATION/TRAINING PROGRAM

## 2022-06-22 PROCEDURE — 3288F FALL RISK ASSESSMENT DOCD: CPT | Mod: CPTII,,, | Performed by: STUDENT IN AN ORGANIZED HEALTH CARE EDUCATION/TRAINING PROGRAM

## 2022-06-22 PROCEDURE — 4010F ACE/ARB THERAPY RXD/TAKEN: CPT | Mod: CPTII,,, | Performed by: STUDENT IN AN ORGANIZED HEALTH CARE EDUCATION/TRAINING PROGRAM

## 2022-06-22 PROCEDURE — 3066F NEPHROPATHY DOC TX: CPT | Mod: CPTII,,, | Performed by: STUDENT IN AN ORGANIZED HEALTH CARE EDUCATION/TRAINING PROGRAM

## 2022-06-22 PROCEDURE — 3079F DIAST BP 80-89 MM HG: CPT | Mod: CPTII,,, | Performed by: STUDENT IN AN ORGANIZED HEALTH CARE EDUCATION/TRAINING PROGRAM

## 2022-06-22 PROCEDURE — 3074F PR MOST RECENT SYSTOLIC BLOOD PRESSURE < 130 MM HG: ICD-10-PCS | Mod: CPTII,,, | Performed by: STUDENT IN AN ORGANIZED HEALTH CARE EDUCATION/TRAINING PROGRAM

## 2022-06-22 PROCEDURE — 4010F PR ACE/ARB THEARPY RXD/TAKEN: ICD-10-PCS | Mod: CPTII,,, | Performed by: STUDENT IN AN ORGANIZED HEALTH CARE EDUCATION/TRAINING PROGRAM

## 2022-06-22 PROCEDURE — 3008F BODY MASS INDEX DOCD: CPT | Mod: CPTII,,, | Performed by: STUDENT IN AN ORGANIZED HEALTH CARE EDUCATION/TRAINING PROGRAM

## 2022-06-22 PROCEDURE — 3288F PR FALLS RISK ASSESSMENT DOCUMENTED: ICD-10-PCS | Mod: CPTII,,, | Performed by: STUDENT IN AN ORGANIZED HEALTH CARE EDUCATION/TRAINING PROGRAM

## 2022-06-22 PROCEDURE — 99213 PR OFFICE/OUTPT VISIT, EST, LEVL III, 20-29 MIN: ICD-10-PCS | Mod: ,,, | Performed by: STUDENT IN AN ORGANIZED HEALTH CARE EDUCATION/TRAINING PROGRAM

## 2022-06-22 PROCEDURE — 3066F PR DOCUMENTATION OF TREATMENT FOR NEPHROPATHY: ICD-10-PCS | Mod: CPTII,,, | Performed by: STUDENT IN AN ORGANIZED HEALTH CARE EDUCATION/TRAINING PROGRAM

## 2022-06-22 PROCEDURE — 1160F RVW MEDS BY RX/DR IN RCRD: CPT | Mod: CPTII,,, | Performed by: STUDENT IN AN ORGANIZED HEALTH CARE EDUCATION/TRAINING PROGRAM

## 2022-06-22 PROCEDURE — 1101F PT FALLS ASSESS-DOCD LE1/YR: CPT | Mod: CPTII,,, | Performed by: STUDENT IN AN ORGANIZED HEALTH CARE EDUCATION/TRAINING PROGRAM

## 2022-06-22 PROCEDURE — 1159F MED LIST DOCD IN RCRD: CPT | Mod: CPTII,,, | Performed by: STUDENT IN AN ORGANIZED HEALTH CARE EDUCATION/TRAINING PROGRAM

## 2022-06-22 PROCEDURE — 1159F PR MEDICATION LIST DOCUMENTED IN MEDICAL RECORD: ICD-10-PCS | Mod: CPTII,,, | Performed by: STUDENT IN AN ORGANIZED HEALTH CARE EDUCATION/TRAINING PROGRAM

## 2022-06-22 NOTE — PROGRESS NOTES
"  Subjective:       Patient ID: Jesi Cha is a 71 y.o. female.    Past Medical History:  HTN (hypertension)  Hypokalemia  Obesity, unspecified  Thyroid cyst     Chief Complaint: Follow-up (Er f/u 06/19/2022 [facility] , " right lower leg discomfort , not healing"), 06/19/2022 niva of right leg done " negative, and Anxiety    Follow up of RLE cellulitis/wound. Pt feels that the wound is not healing She was worried about it so presented to an outside ED 3 days ago where DVT was ruled out and the wound was dressed. She finished the 7 days of doxycycline I had prescribed. She has not picked up the Bactroban. She's been changing her dressing once per day. Has continued using Neosporin. No fever, chills, LAD, fatigue, weakness, NVD, or other wounds.      Review of Systems   Constitutional: Negative for chills, fatigue, fever and unexpected weight change.   HENT: Negative for nasal congestion, hearing loss, sinus pressure/congestion and sore throat.    Eyes: Negative for pain, redness and visual disturbance.   Respiratory: Negative for cough, shortness of breath and wheezing.    Cardiovascular: Negative for chest pain, palpitations and leg swelling.   Gastrointestinal: Negative for abdominal pain, change in bowel habit, diarrhea, nausea, vomiting and change in bowel habit.   Endocrine: Negative for cold intolerance, heat intolerance, polydipsia and polyuria.   Genitourinary: Negative for dysuria, frequency, hematuria and urgency.   Musculoskeletal: Negative for arthralgias, joint swelling and myalgias.   Integumentary:  Positive for color change and wound.   Neurological: Negative for dizziness, syncope, weakness, numbness and headaches.   Hematological: Negative for adenopathy. Does not bruise/bleed easily.   Psychiatric/Behavioral: Negative for confusion. The patient is not nervous/anxious.            Objective:      Physical Exam  Vitals and nursing note reviewed.   Constitutional:       General: She is not " in acute distress.     Appearance: She is not ill-appearing.   HENT:      Nose: No congestion or rhinorrhea.      Mouth/Throat:      Mouth: Mucous membranes are moist.      Pharynx: No oropharyngeal exudate or posterior oropharyngeal erythema.   Eyes:      Extraocular Movements: Extraocular movements intact.      Conjunctiva/sclera: Conjunctivae normal.      Pupils: Pupils are equal, round, and reactive to light.   Cardiovascular:      Rate and Rhythm: Normal rate and regular rhythm.      Pulses: Normal pulses.      Heart sounds: Murmur (grade 3/6 systolic murmur loudest at R 2nd ICS) heard.   Pulmonary:      Effort: Pulmonary effort is normal.      Breath sounds: Normal breath sounds.   Abdominal:      Palpations: Abdomen is soft. There is no mass.      Tenderness: There is no abdominal tenderness.   Musculoskeletal:         General: No deformity or signs of injury.      Cervical back: Neck supple.      Right lower leg: Right lower leg edema: trace.      Left lower leg: Left lower leg edema: trace.   Lymphadenopathy:      Cervical: No cervical adenopathy.   Skin:     General: Skin is warm and dry.      Findings: RLE wound approx 3-4 cm in diameter is healing well compared to last visit, granulation tissue present in middle, no purulence, erythema, fluctuance, etc.  Neurological:      General: No focal deficit present.      Mental Status: She is alert. Mental status is at baseline.   Psychiatric:         Mood and Affect: Mood normal.         Behavior: Behavior normal.           Assessment & Plan:   1. Cellulitis of right lower extremity  Assessment & Plan:  Wound is healing slowly, but it is healing. Healing likely impaired sec to chronic illnesses.  Will try to get short term home health to help patient dress wound and to monitor healing.   Counseled to continue dressing changes. Keep dressings loose.   Instructed to  Bactroban  Notify MD if symptoms worsen or change    Orders:  -     Ambulatory  referral/consult to Home Health      F/u in 3 mo for wellness

## 2022-06-22 NOTE — ASSESSMENT & PLAN NOTE
Wound is healing slowly, but it is healing. Healing likely impaired sec to chronic illnesses.  Completed 7 days of doxycycline and wound now looks better.  Will try to get short term home health to help patient dress wound and to monitor healing.   Counseled to continue dressing changes. Keep dressings loose.   Instructed to  Bactroban  Notify MD if symptoms worsen or change

## 2022-06-23 ENCOUNTER — DOCUMENTATION ONLY (OUTPATIENT)
Dept: ADMINISTRATIVE | Facility: HOSPITAL | Age: 72
End: 2022-06-23
Payer: MEDICARE

## 2022-06-24 PROCEDURE — G0180 MD CERTIFICATION HHA PATIENT: HCPCS | Mod: ,,, | Performed by: STUDENT IN AN ORGANIZED HEALTH CARE EDUCATION/TRAINING PROGRAM

## 2022-06-24 PROCEDURE — G0180 PR HOME HEALTH MD CERTIFICATION: ICD-10-PCS | Mod: ,,, | Performed by: STUDENT IN AN ORGANIZED HEALTH CARE EDUCATION/TRAINING PROGRAM

## 2022-07-05 ENCOUNTER — TELEPHONE (OUTPATIENT)
Dept: PRIMARY CARE CLINIC | Facility: CLINIC | Age: 72
End: 2022-07-05
Payer: MEDICARE

## 2022-07-05 DIAGNOSIS — S81.801D WOUND OF RIGHT LOWER EXTREMITY, SUBSEQUENT ENCOUNTER: Primary | ICD-10-CM

## 2022-07-05 NOTE — TELEPHONE ENCOUNTER
Gilda with [facility] is needing an order for patient to attend wound care clinic for possible debridement , orders to be fax [phone].

## 2022-07-07 ENCOUNTER — EXTERNAL HOME HEALTH (OUTPATIENT)
Dept: HOME HEALTH SERVICES | Facility: HOSPITAL | Age: 72
End: 2022-07-07
Payer: MEDICARE

## 2022-07-19 DIAGNOSIS — L03.115 CELLULITIS OF RIGHT LOWER EXTREMITY: Primary | ICD-10-CM

## 2022-07-19 NOTE — TELEPHONE ENCOUNTER
----- Message from Princess Wesley sent at 7/19/2022  2:07 PM CDT -----  Regarding: Requesting pain meds  Pt states she went to the Wound Care 2 Weeks ago and they scraped out the ulcer. She said she's in so much pain. She can't take it anymore. She need something stronger than tylenol because it isn't helping. It's hard for her to walk on her right foot. She's requesting some pain meds.       Please Advise.     Pharmacy- [facility] in [place]  Call Back - [phone]

## 2022-07-19 NOTE — TELEPHONE ENCOUNTER
----- Message from Dk Rodriguez MD sent at 7/19/2022  3:47 PM CDT -----  Regarding: RE: Requesting pain meds  I can send a script for some Tramadol. However, her chart has an unspecified allergy to codeine. Can we find out what her allergy is? Does she get hives, throat swelling, difficulty breathing, etc?     Thanks  Dk Rodriguez MD    ----- Message -----  From:  [place]  Sent: 7/19/2022   2:09 PM CDT  To: Dk Rodriguez MD  Subject: Requesting pain meds                             Pt states she went to the Wound Care 2 Weeks ago and they scraped out the ulcer. She said she's in so much pain. She can't take it anymore. She need something stronger than tylenol because it isn't helping. It's hard for her to walk on her right foot. She's requesting some pain meds.       Please Advise.     Pharmacy- [facility] in [place]  Call Back - [phone]

## 2022-07-20 RX ORDER — TRAMADOL HYDROCHLORIDE 50 MG/1
50 TABLET ORAL EVERY 12 HOURS PRN
Qty: 14 TABLET | Refills: 0 | Status: SHIPPED | OUTPATIENT
Start: 2022-07-20 | End: 2022-07-27

## 2022-07-22 ENCOUNTER — DOCUMENTATION ONLY (OUTPATIENT)
Dept: ADMINISTRATIVE | Facility: HOSPITAL | Age: 72
End: 2022-07-22
Payer: MEDICARE

## 2022-07-25 ENCOUNTER — TELEPHONE (OUTPATIENT)
Dept: PRIMARY CARE CLINIC | Facility: CLINIC | Age: 72
End: 2022-07-25
Payer: MEDICARE

## 2022-07-25 NOTE — TELEPHONE ENCOUNTER
"Spoke to patient who reports her blood pressure was 130/80 this morning when checked by home health nurse/.    Patient reports she is not taking her labetalol "burning of her head"  Patient reports to need a wheelchair.   Patient reports she is still in a lot of pain.  Very hard to get around.     Spoke to home health will forward log of BP for the weekend.   Would like sent to [facility].    "

## 2022-07-28 ENCOUNTER — LAB VISIT (OUTPATIENT)
Dept: LAB | Facility: HOSPITAL | Age: 72
End: 2022-07-28
Attending: INTERNAL MEDICINE
Payer: MEDICARE

## 2022-07-28 DIAGNOSIS — N18.30 STAGE 3 CHRONIC KIDNEY DISEASE, UNSPECIFIED WHETHER STAGE 3A OR 3B CKD: ICD-10-CM

## 2022-07-28 DIAGNOSIS — I10 HYPERTENSION, UNSPECIFIED TYPE: ICD-10-CM

## 2022-08-01 ENCOUNTER — OFFICE VISIT (OUTPATIENT)
Dept: PRIMARY CARE CLINIC | Facility: CLINIC | Age: 72
End: 2022-08-01
Payer: MEDICARE

## 2022-08-01 ENCOUNTER — APPOINTMENT (OUTPATIENT)
Dept: LAB | Facility: HOSPITAL | Age: 72
End: 2022-08-01
Attending: INTERNAL MEDICINE
Payer: MEDICARE

## 2022-08-01 VITALS
DIASTOLIC BLOOD PRESSURE: 62 MMHG | SYSTOLIC BLOOD PRESSURE: 107 MMHG | BODY MASS INDEX: 37.21 KG/M2 | HEIGHT: 63 IN | TEMPERATURE: 98 F | HEART RATE: 70 BPM | WEIGHT: 210 LBS

## 2022-08-01 DIAGNOSIS — Z78.9 ALTERATION IN MOBILITY ASSOCIATED WITH PAIN: ICD-10-CM

## 2022-08-01 DIAGNOSIS — R52 ALTERATION IN MOBILITY ASSOCIATED WITH PAIN: ICD-10-CM

## 2022-08-01 DIAGNOSIS — L97.312 CHRONIC ULCER OF RIGHT ANKLE WITH FAT LAYER EXPOSED: Primary | ICD-10-CM

## 2022-08-01 DIAGNOSIS — Z74.09 IMPAIRED MOBILITY: ICD-10-CM

## 2022-08-01 DIAGNOSIS — Z91.81 AT RISK FOR FALLS: ICD-10-CM

## 2022-08-01 LAB
APPEARANCE UR: ABNORMAL
BACTERIA #/AREA URNS AUTO: ABNORMAL /HPF
BILIRUB UR QL STRIP.AUTO: NEGATIVE MG/DL
COLOR UR AUTO: YELLOW
CREAT UR-MCNC: 48.4 MG/DL (ref 47–110)
GLUCOSE UR QL STRIP.AUTO: NEGATIVE MG/DL
KETONES UR QL STRIP.AUTO: NEGATIVE MG/DL
LEUKOCYTE ESTERASE UR QL STRIP.AUTO: ABNORMAL UNIT/L
NITRITE UR QL STRIP.AUTO: POSITIVE
PH UR STRIP.AUTO: 7.5 [PH]
PROT UR QL STRIP.AUTO: NEGATIVE MG/DL
PROT UR STRIP-MCNC: 25.5 MG/DL
PROT/CREAT UR-RTO: 526.9 MG/GM CR
RBC #/AREA URNS AUTO: ABNORMAL /HPF
RBC UR QL AUTO: ABNORMAL UNIT/L
SP GR UR STRIP.AUTO: 1.02
SQUAMOUS #/AREA URNS AUTO: ABNORMAL /HPF
UROBILINOGEN UR STRIP-ACNC: 0.2 MG/DL
WBC #/AREA URNS AUTO: ABNORMAL /HPF

## 2022-08-01 PROCEDURE — 3008F BODY MASS INDEX DOCD: CPT | Mod: CPTII,,, | Performed by: STUDENT IN AN ORGANIZED HEALTH CARE EDUCATION/TRAINING PROGRAM

## 2022-08-01 PROCEDURE — 1100F PTFALLS ASSESS-DOCD GE2>/YR: CPT | Mod: CPTII,,, | Performed by: STUDENT IN AN ORGANIZED HEALTH CARE EDUCATION/TRAINING PROGRAM

## 2022-08-01 PROCEDURE — 3288F FALL RISK ASSESSMENT DOCD: CPT | Mod: CPTII,,, | Performed by: STUDENT IN AN ORGANIZED HEALTH CARE EDUCATION/TRAINING PROGRAM

## 2022-08-01 PROCEDURE — 3066F NEPHROPATHY DOC TX: CPT | Mod: CPTII,,, | Performed by: STUDENT IN AN ORGANIZED HEALTH CARE EDUCATION/TRAINING PROGRAM

## 2022-08-01 PROCEDURE — 1159F MED LIST DOCD IN RCRD: CPT | Mod: CPTII,,, | Performed by: STUDENT IN AN ORGANIZED HEALTH CARE EDUCATION/TRAINING PROGRAM

## 2022-08-01 PROCEDURE — 81001 URINALYSIS AUTO W/SCOPE: CPT

## 2022-08-01 PROCEDURE — 3066F PR DOCUMENTATION OF TREATMENT FOR NEPHROPATHY: ICD-10-PCS | Mod: CPTII,,, | Performed by: STUDENT IN AN ORGANIZED HEALTH CARE EDUCATION/TRAINING PROGRAM

## 2022-08-01 PROCEDURE — 82570 ASSAY OF URINE CREATININE: CPT

## 2022-08-01 PROCEDURE — 4010F ACE/ARB THERAPY RXD/TAKEN: CPT | Mod: CPTII,,, | Performed by: STUDENT IN AN ORGANIZED HEALTH CARE EDUCATION/TRAINING PROGRAM

## 2022-08-01 PROCEDURE — 99214 PR OFFICE/OUTPT VISIT, EST, LEVL IV, 30-39 MIN: ICD-10-PCS | Mod: ,,, | Performed by: STUDENT IN AN ORGANIZED HEALTH CARE EDUCATION/TRAINING PROGRAM

## 2022-08-01 PROCEDURE — 1100F PR PT FALLS ASSESS DOC 2+ FALLS/FALL W/INJURY/YR: ICD-10-PCS | Mod: CPTII,,, | Performed by: STUDENT IN AN ORGANIZED HEALTH CARE EDUCATION/TRAINING PROGRAM

## 2022-08-01 PROCEDURE — 3078F PR MOST RECENT DIASTOLIC BLOOD PRESSURE < 80 MM HG: ICD-10-PCS | Mod: CPTII,,, | Performed by: STUDENT IN AN ORGANIZED HEALTH CARE EDUCATION/TRAINING PROGRAM

## 2022-08-01 PROCEDURE — 3288F PR FALLS RISK ASSESSMENT DOCUMENTED: ICD-10-PCS | Mod: CPTII,,, | Performed by: STUDENT IN AN ORGANIZED HEALTH CARE EDUCATION/TRAINING PROGRAM

## 2022-08-01 PROCEDURE — 3074F SYST BP LT 130 MM HG: CPT | Mod: CPTII,,, | Performed by: STUDENT IN AN ORGANIZED HEALTH CARE EDUCATION/TRAINING PROGRAM

## 2022-08-01 PROCEDURE — 99214 OFFICE O/P EST MOD 30 MIN: CPT | Mod: ,,, | Performed by: STUDENT IN AN ORGANIZED HEALTH CARE EDUCATION/TRAINING PROGRAM

## 2022-08-01 PROCEDURE — 3078F DIAST BP <80 MM HG: CPT | Mod: CPTII,,, | Performed by: STUDENT IN AN ORGANIZED HEALTH CARE EDUCATION/TRAINING PROGRAM

## 2022-08-01 PROCEDURE — 3008F PR BODY MASS INDEX (BMI) DOCUMENTED: ICD-10-PCS | Mod: CPTII,,, | Performed by: STUDENT IN AN ORGANIZED HEALTH CARE EDUCATION/TRAINING PROGRAM

## 2022-08-01 PROCEDURE — 3074F PR MOST RECENT SYSTOLIC BLOOD PRESSURE < 130 MM HG: ICD-10-PCS | Mod: CPTII,,, | Performed by: STUDENT IN AN ORGANIZED HEALTH CARE EDUCATION/TRAINING PROGRAM

## 2022-08-01 PROCEDURE — 1159F PR MEDICATION LIST DOCUMENTED IN MEDICAL RECORD: ICD-10-PCS | Mod: CPTII,,, | Performed by: STUDENT IN AN ORGANIZED HEALTH CARE EDUCATION/TRAINING PROGRAM

## 2022-08-01 PROCEDURE — 4010F PR ACE/ARB THEARPY RXD/TAKEN: ICD-10-PCS | Mod: CPTII,,, | Performed by: STUDENT IN AN ORGANIZED HEALTH CARE EDUCATION/TRAINING PROGRAM

## 2022-08-01 RX ORDER — TRAMADOL HYDROCHLORIDE 50 MG/1
50 TABLET ORAL EVERY 12 HOURS PRN
Qty: 20 TABLET | Refills: 0 | Status: SHIPPED | OUTPATIENT
Start: 2022-08-01 | End: 2022-10-05

## 2022-08-01 RX ORDER — LEVOFLOXACIN 500 MG/1
500 TABLET, FILM COATED ORAL DAILY
COMMUNITY
Start: 2022-07-11 | End: 2022-10-05

## 2022-08-01 NOTE — ASSESSMENT & PLAN NOTE
Ulcer now present >2 months, new ulcer developed next to original ulcer. Pain is out of proportion with ulcer size and location. Pain now impairing mobility and increasing risk for falls in patient who is already at increased risk. ESR/CRP unlikely to be useful in setting of significant CKD. So, we'll proceed with imaging to r/o tendon, muscle, bone involvement. Start with XR tib/fib then proceed to MRI if needed.  Continue f/u with wound care physician  Refilling 20 tab tramadol 50 mg q 12 hr for severe pain

## 2022-08-01 NOTE — PROGRESS NOTES
"Subjective:       Patient ID: Jesi Cha is a 71 y.o. female.    ----------------------------  HTN (hypertension)  Hypokalemia  Obesity, unspecified  Thyroid cyst     Chief Complaint: Arm Pain, Leg Pain (Right leg cellulitis-/Recent fall- shoulder pain -left/Small drainage), and eval for wheelchair (Falls- rollator or wheelchair.)    Presents for f/u of wound on RLE. She is also requesting a wheelchair. Since last visit, she has been seeing a wound care physician (Dr. Nieves) at [facility]. He has done a few debridements as well as arterial and venous US. She's been referred to a "vein specialist". Pt unsure who. Seems that the initial ulcer improved, but a secondary ulcer formed from tape. Now has two ulcers on lateral aspect of lower R leg. No fevers, chills, N/V. Ulcers draining serous fluid only. Pain is intermittent but severe. Obviously worsened after debridements and dressing changes. However, on a constant basis, she's barely able to put any pressure on the RLE sec to pain. She used her son's walker which helped, but she used our wheelchair in clinic today.     Review of Systems   Constitutional: Negative for chills, fatigue, fever and unexpected weight change.   HENT: Negative for nasal congestion, hearing loss, sinus pressure/congestion and sore throat.    Eyes: Negative for pain and redness.   Respiratory: Negative for cough, shortness of breath and wheezing.    Cardiovascular: Negative for chest pain, palpitations and leg swelling.   Gastrointestinal: Negative for abdominal pain, change in bowel habit, diarrhea, nausea, vomiting and change in bowel habit.   Endocrine: Negative for polydipsia and polyuria.   Genitourinary: Negative for dysuria, frequency, hematuria and urgency.   Musculoskeletal: Negative for arthralgias and joint swelling.   Integumentary:         See HPI   Neurological: Negative for dizziness, syncope, numbness and headaches.   Hematological: Negative for adenopathy. Does not " bruise/bleed easily.   Psychiatric/Behavioral: Negative for confusion. The patient is not nervous/anxious.          Objective:      Physical Exam  Vitals and nursing note reviewed.   Constitutional:       General: She is not in acute distress.     Appearance: She is not ill-appearing.   HENT:      Nose: No congestion or rhinorrhea.      Mouth/Throat:      Mouth: Mucous membranes are moist.      Pharynx: No oropharyngeal exudate or posterior oropharyngeal erythema.   Eyes:      Extraocular Movements: Extraocular movements intact.      Conjunctiva/sclera: Conjunctivae normal.      Pupils: Pupils are equal, round, and reactive to light.   Cardiovascular:      Rate and Rhythm: Normal rate and regular rhythm.      Pulses: Normal pulses.      Heart sounds: Murmur (grade 3/6 systolic murmur loudest at R 2nd ICS) heard.   Pulmonary:      Effort: Pulmonary effort is normal.      Breath sounds: Normal breath sounds.   Abdominal:      Palpations: Abdomen is soft. There is no mass.      Tenderness: There is no abdominal tenderness.   Musculoskeletal:         General: No deformity or signs of injury.      Right lower leg: Right lower leg edema: trace.      Left lower leg: Left lower leg edema: trace.   Lymphadenopathy:      Cervical: No cervical adenopathy.   Skin:     Comments: Two ulcers on the lateral R lower leg about 10 cm proximal to lateral malleolus, heavy granulation tissue in each, surrounding skin hyperpigmented, mildly tender, serous drainage noted   Neurological:      General: No focal deficit present.      Mental Status: She is alert. Mental status is at baseline.   Psychiatric:         Mood and Affect: Mood normal.         Behavior: Behavior normal.         Assessment & Plan:   1. Chronic ulcer of right ankle with fat layer exposed  Assessment & Plan:  Ulcer now present >2 months, new ulcer developed next to original ulcer. Pain is out of proportion with ulcer size and location. Pain now impairing mobility and  increasing risk for falls in patient who is already at increased risk. ESR/CRP unlikely to be useful in setting of significant CKD. So, we'll proceed with imaging to r/o tendon, muscle, bone involvement. Start with XR tib/fib then proceed to MRI if needed.  Continue f/u with wound care physician  Refilling 20 tab tramadol 50 mg q 12 hr for severe pain    Orders:  -     X-Ray Tibia Fibula 2 View Right  -     WALKER FOR HOME USE  -     traMADoL (ULTRAM) 50 mg tablet    2. Impaired mobility  Assessment & Plan:  Discussed wheelchair. I feel she's at increased risk for muscle breakdown, weakness, further skin wounds, etc if she gets a wheelchair. Need to keep her postural muscles and leg muscles engaged while providing stability and safety. Regular rolling walker not ideal because she needs a seat in case the pain or weakness gets severe. So, prescribing a Rolator which will keep her strength up, serve as a seat in times of need, and provide safety and stability. It will serve to help her retrain her gain once these ulcers heal.     Orders:  -     WALKER FOR HOME USE    3. At risk for falls  -     WALKER FOR HOME USE    4. Alteration in mobility associated with pain  -     WALKER FOR HOME USE  -     traMADoL (ULTRAM) 50 mg tablet

## 2022-08-01 NOTE — ASSESSMENT & PLAN NOTE
Discussed wheelchair. I feel she's at increased risk for muscle breakdown, weakness, further skin wounds, etc if she gets a wheelchair. Need to keep her postural muscles and leg muscles engaged while providing stability and safety. Regular rolling walker not ideal because she needs a seat in case the pain or weakness gets severe. So, prescribing a Rolator which will keep her strength up, serve as a seat in times of need, and provide safety and stability. It will serve to help her retrain her gain once these ulcers heal.

## 2022-08-03 ENCOUNTER — OFFICE VISIT (OUTPATIENT)
Dept: NEPHROLOGY | Facility: CLINIC | Age: 72
End: 2022-08-03
Payer: MEDICARE

## 2022-08-03 VITALS
HEIGHT: 63 IN | OXYGEN SATURATION: 95 % | WEIGHT: 202 LBS | BODY MASS INDEX: 35.79 KG/M2 | DIASTOLIC BLOOD PRESSURE: 89 MMHG | HEART RATE: 112 BPM | TEMPERATURE: 98 F | RESPIRATION RATE: 18 BRPM | SYSTOLIC BLOOD PRESSURE: 140 MMHG

## 2022-08-03 DIAGNOSIS — N18.30 STAGE 3 CHRONIC KIDNEY DISEASE, UNSPECIFIED WHETHER STAGE 3A OR 3B CKD: ICD-10-CM

## 2022-08-03 DIAGNOSIS — I10 HYPERTENSION, UNSPECIFIED TYPE: Primary | ICD-10-CM

## 2022-08-03 DIAGNOSIS — Z78.9 ALTERATION IN MOBILITY ASSOCIATED WITH PAIN: ICD-10-CM

## 2022-08-03 DIAGNOSIS — R52 ALTERATION IN MOBILITY ASSOCIATED WITH PAIN: ICD-10-CM

## 2022-08-03 DIAGNOSIS — L97.312 CHRONIC ULCER OF RIGHT ANKLE WITH FAT LAYER EXPOSED: ICD-10-CM

## 2022-08-03 PROCEDURE — 3079F DIAST BP 80-89 MM HG: CPT | Mod: CPTII,S$GLB,, | Performed by: INTERNAL MEDICINE

## 2022-08-03 PROCEDURE — 4010F ACE/ARB THERAPY RXD/TAKEN: CPT | Mod: CPTII,S$GLB,, | Performed by: INTERNAL MEDICINE

## 2022-08-03 PROCEDURE — 3008F PR BODY MASS INDEX (BMI) DOCUMENTED: ICD-10-PCS | Mod: CPTII,S$GLB,, | Performed by: INTERNAL MEDICINE

## 2022-08-03 PROCEDURE — 3008F BODY MASS INDEX DOCD: CPT | Mod: CPTII,S$GLB,, | Performed by: INTERNAL MEDICINE

## 2022-08-03 PROCEDURE — 99214 PR OFFICE/OUTPT VISIT, EST, LEVL IV, 30-39 MIN: ICD-10-PCS | Mod: S$GLB,,, | Performed by: INTERNAL MEDICINE

## 2022-08-03 PROCEDURE — 3288F PR FALLS RISK ASSESSMENT DOCUMENTED: ICD-10-PCS | Mod: CPTII,S$GLB,, | Performed by: INTERNAL MEDICINE

## 2022-08-03 PROCEDURE — 99214 OFFICE O/P EST MOD 30 MIN: CPT | Mod: S$GLB,,, | Performed by: INTERNAL MEDICINE

## 2022-08-03 PROCEDURE — 1101F PT FALLS ASSESS-DOCD LE1/YR: CPT | Mod: CPTII,S$GLB,, | Performed by: INTERNAL MEDICINE

## 2022-08-03 PROCEDURE — 3077F PR MOST RECENT SYSTOLIC BLOOD PRESSURE >= 140 MM HG: ICD-10-PCS | Mod: CPTII,S$GLB,, | Performed by: INTERNAL MEDICINE

## 2022-08-03 PROCEDURE — 3079F PR MOST RECENT DIASTOLIC BLOOD PRESSURE 80-89 MM HG: ICD-10-PCS | Mod: CPTII,S$GLB,, | Performed by: INTERNAL MEDICINE

## 2022-08-03 PROCEDURE — 1159F PR MEDICATION LIST DOCUMENTED IN MEDICAL RECORD: ICD-10-PCS | Mod: CPTII,S$GLB,, | Performed by: INTERNAL MEDICINE

## 2022-08-03 PROCEDURE — 3288F FALL RISK ASSESSMENT DOCD: CPT | Mod: CPTII,S$GLB,, | Performed by: INTERNAL MEDICINE

## 2022-08-03 PROCEDURE — 3066F PR DOCUMENTATION OF TREATMENT FOR NEPHROPATHY: ICD-10-PCS | Mod: CPTII,S$GLB,, | Performed by: INTERNAL MEDICINE

## 2022-08-03 PROCEDURE — 1101F PR PT FALLS ASSESS DOC 0-1 FALLS W/OUT INJ PAST YR: ICD-10-PCS | Mod: CPTII,S$GLB,, | Performed by: INTERNAL MEDICINE

## 2022-08-03 PROCEDURE — 3077F SYST BP >= 140 MM HG: CPT | Mod: CPTII,S$GLB,, | Performed by: INTERNAL MEDICINE

## 2022-08-03 PROCEDURE — 99999 PR PBB SHADOW E&M-EST. PATIENT-LVL IV: ICD-10-PCS | Mod: PBBFAC,,, | Performed by: INTERNAL MEDICINE

## 2022-08-03 PROCEDURE — 4010F PR ACE/ARB THEARPY RXD/TAKEN: ICD-10-PCS | Mod: CPTII,S$GLB,, | Performed by: INTERNAL MEDICINE

## 2022-08-03 PROCEDURE — 99999 PR PBB SHADOW E&M-EST. PATIENT-LVL IV: CPT | Mod: PBBFAC,,, | Performed by: INTERNAL MEDICINE

## 2022-08-03 PROCEDURE — 3066F NEPHROPATHY DOC TX: CPT | Mod: CPTII,S$GLB,, | Performed by: INTERNAL MEDICINE

## 2022-08-03 PROCEDURE — 1159F MED LIST DOCD IN RCRD: CPT | Mod: CPTII,S$GLB,, | Performed by: INTERNAL MEDICINE

## 2022-08-03 RX ORDER — TRAMADOL HYDROCHLORIDE 50 MG/1
TABLET ORAL
Qty: 14 TABLET | Refills: 0 | OUTPATIENT
Start: 2022-08-03

## 2022-08-03 NOTE — TELEPHONE ENCOUNTER
Attempted to reach patient , family member answer phone, left message that meds was sent to [facility] pharmacy 08/01/2022, voices understanding .   also left message on mobile phone [phone]

## 2022-08-03 NOTE — PROGRESS NOTES
[facility] [place] [facility] Progress Note      HPI  Jesi Cha, 71 y.o. female,  has a past medical history of HTN (hypertension), Hypokalemia, Obesity, unspecified, and Thyroid cyst. 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 Her HTN is now controlled on the current medication regimen. She is currently being treated for ulcer to right lower ext. She is losing weight.    Patient denies taking NSAIDs, new antibiotics or recreational drugs. Denies recent episode of dehydration, diarrhea, vomiting, acute illness, hospitalization, recent angiograms or exposure to IV radiocontrast.  No new complaints    Medical History:   Past Medical History:   Diagnosis Date    HTN (hypertension)     Hypokalemia     Obesity, unspecified     Thyroid cyst        Surgical History:   Past Surgical History:   Procedure Laterality Date    APPENDECTOMY       SECTION      CHOLECYSTECTOMY         Family History:   Family History   Problem Relation Age of Onset    Arthritis Mother        Social History:   Social History     Tobacco Use    Smoking status: Never Smoker    Smokeless tobacco: Never Used   Substance Use Topics    Alcohol use: Never       Allergies:  Review of patient's allergies indicates:   Allergen Reactions    Codeine        Medications:    Current Outpatient Medications:     aspirin 81 mg Cap, Take 81 mg by mouth once daily at 6am., Disp: , Rfl:     calcitRIOL (ROCALTROL) 0.25 MCG Cap, Take 0.25 mcg by mouth every other day., Disp: , Rfl:     diclofenac sodium (VOLTAREN) 1 % Gel, APPLY 4 GRAMS TOPICALLY 4 TIMES DAILY AS NEEDED FOR PAIN APPLY A THIN FILM TO EACH KNEE AS NEEDED FOR ARTHRITIS PAIN. NOT TO EXCEED 16 GRAMS/DAY/SINGLE JOINT OF LOWER EXTREMITIES., Disp: , Rfl:     doxycycline (VIBRA-TABS) 100 MG tablet, Take 1 tablet (100 mg total) by mouth 2 (two) times daily., Disp: 14 tablet, Rfl: 0    fluticasone  propionate (FLONASE) 50 mcg/actuation nasal spray, USE 1 TO 2 SPRAY(S) IN EACH NOSTRIL TWICE DAILY FOR ALLERGY SYMPTOMS, Disp: , Rfl:     hydrALAZINE (APRESOLINE) 50 MG tablet, Take 50 mg by mouth 3 (three) times daily., Disp: , Rfl:     losartan (COZAAR) 50 MG tablet, Take 50 mg by mouth once daily at 6am., Disp: , Rfl:     NIFEdipine (PROCARDIA-XL) 60 MG (OSM) 24 hr tablet, Take 60 mg by mouth 2 (two) times a day., Disp: , Rfl:     nitroGLYCERIN (NITROSTAT) 0.4 MG SL tablet, Place 0.4 mg under the tongue., Disp: , Rfl:     spironolactone (ALDACTONE) 25 MG tablet, Take 1 tablet (25 mg total) by mouth once daily., Disp: 30 tablet, Rfl: 11    atorvastatin (LIPITOR) 40 MG tablet, Take 40 mg by mouth once daily., Disp: , Rfl:     levoFLOXacin (LEVAQUIN) 500 MG tablet, Take 500 mg by mouth once daily., Disp: , Rfl:     mupirocin (BACTROBAN) 2 % ointment, Apply topically 2 (two) times daily. Apply thin film to affected area of leg (Patient not taking: Reported on 8/3/2022), Disp: 15 g, Rfl: 0    traMADoL (ULTRAM) 50 mg tablet, Take 1 tablet (50 mg total) by mouth every 12 (twelve) hours as needed for Pain. (Patient not taking: Reported on 8/3/2022), Disp: 20 tablet, Rfl: 0       ROS:    Constitutional: Denies fever, fatigue, generalized weakness, night sweats, or acute weight change  Skin: R lower ext wound +++dressing  HEENT: Denies acute change in hearing or vision, tinnitus, or dysphagia  Respiratory:  Denies cough, shortness of breath, or wheezing  Cardiovascular: Denies chest pain, palpitations, or swelling  Gastrointestional: Denies abdominal pain, nausea, vomiting, diarrhea, or constipation  Genitourinary: Denies dysuria, hematuria, or incontinence; reports able to empty bladder  Musculoskeletal: Denies myalgias, back pain,needs rollator  Neurological: Denies headaches, seizures, dizziness, paresthesias or weakness  Hematological: Denies unusual bruising or bleeding  Psychiatric: Denies hallucinations,  "depression, or confusion      Vital Signs:  BP (!) 140/89 (BP Location: Left arm)   Pulse (!) 112   Temp 98 °F (36.7 °C)   Resp 18   Ht 5' 3" (1.6 m)   Wt 91.6 kg (202 lb)   SpO2 95%   BMI 35.78 kg/m²   Body mass index is 35.78 kg/m².  Repeat HR  = 96/min    Physical Exam:    General: no acute distress, awake, alert  Eyes: PERRLA, EOMI, conjunctiva clear, eyelids without swelling  HENT: atraumatic, oropharynx and nasal mucosa patent  Neck: full ROM, no JVD, no thyromegaly or lymphadenopathy  Respiratory: equal, unlabored, clear to auscultation A/P  Cardiovascular: RRR without  rub; radial and pedal pulses felt  Edema: none  Gastrointestinal: soft, non-tender, non-distended; positive bowel sounds; no masses to palpation  Musculoskeletal: needs walker, ++RLE wound dressing  Integumentary: warm, dry; no rashes,   Neurological: oriented, appropriate, no acute deficits      Labs:        Component Value Date/Time     07/28/2022 1700     06/10/2022 1543    K 4.1 07/28/2022 1700    K 3.9 06/10/2022 1543    CO2 24 07/28/2022 1700    CO2 23 06/10/2022 1543    BUN 50.1 (H) 07/28/2022 1700    BUN 42.3 (H) 06/10/2022 1543    CREATININE 2.79 (H) 07/28/2022 1700    CREATININE 2.66 (H) 06/10/2022 1543    CALCIUM 10.3 (H) 07/28/2022 1700    CALCIUM 9.9 06/10/2022 1543    PHOS 3.7 04/01/2022 0919            Component Value Date/Time    WBC 7.0 07/28/2022 1700    WBC 6.4 06/10/2022 1543    HGB 14.0 07/28/2022 1700    HGB 13.4 06/10/2022 1543    HCT 42.8 07/28/2022 1700    HCT 41.8 06/10/2022 1543     07/28/2022 1700     06/10/2022 1543         Impression:    Patient Active Problem List   Diagnosis    Class 2 obesity due to excess calories with body mass index (BMI) of 37.0 to 37.9 in adult    COVID-19    Cyst of thyroid    Hypokalemia    Primary hypertension    Stage 3 chronic kidney disease    Cellulitis of right lower extremity    Mitral regurgitation    Mild aortic regurgitation    Chronic " ulcer of right ankle with fat layer exposed    Impaired mobility     1. Hypertension, unspecified type     2. 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       Annabel Clemons

## 2022-08-08 ENCOUNTER — TELEPHONE (OUTPATIENT)
Dept: PRIMARY CARE CLINIC | Facility: CLINIC | Age: 72
End: 2022-08-08
Payer: MEDICARE

## 2022-08-09 ENCOUNTER — TELEPHONE (OUTPATIENT)
Dept: PRIMARY CARE CLINIC | Facility: CLINIC | Age: 72
End: 2022-08-09
Payer: MEDICARE

## 2022-08-10 ENCOUNTER — TELEPHONE (OUTPATIENT)
Dept: PRIMARY CARE CLINIC | Facility: CLINIC | Age: 72
End: 2022-08-10
Payer: MEDICARE

## 2022-08-10 NOTE — TELEPHONE ENCOUNTER
"Spoke with patient ,advise of MRI results : voices understanding . Patient states "I am  doing better everyday."  "

## 2022-08-23 PROCEDURE — G0179 PR HOME HEALTH MD RECERTIFICATION: ICD-10-PCS | Mod: ,,, | Performed by: STUDENT IN AN ORGANIZED HEALTH CARE EDUCATION/TRAINING PROGRAM

## 2022-08-23 PROCEDURE — G0179 MD RECERTIFICATION HHA PT: HCPCS | Mod: ,,, | Performed by: STUDENT IN AN ORGANIZED HEALTH CARE EDUCATION/TRAINING PROGRAM

## 2022-09-19 ENCOUNTER — OFFICE VISIT (OUTPATIENT)
Dept: PRIMARY CARE CLINIC | Facility: CLINIC | Age: 72
End: 2022-09-19
Payer: MEDICARE

## 2022-09-19 VITALS
BODY MASS INDEX: 35.44 KG/M2 | DIASTOLIC BLOOD PRESSURE: 80 MMHG | WEIGHT: 200 LBS | TEMPERATURE: 98 F | RESPIRATION RATE: 20 BRPM | HEART RATE: 87 BPM | HEIGHT: 63 IN | OXYGEN SATURATION: 97 % | SYSTOLIC BLOOD PRESSURE: 138 MMHG

## 2022-09-19 DIAGNOSIS — N18.4 CHRONIC KIDNEY DISEASE (CKD), STAGE IV (SEVERE): ICD-10-CM

## 2022-09-19 DIAGNOSIS — R73.01 ELEVATED FASTING GLUCOSE: ICD-10-CM

## 2022-09-19 DIAGNOSIS — L97.312 CHRONIC ULCER OF RIGHT ANKLE WITH FAT LAYER EXPOSED: Primary | ICD-10-CM

## 2022-09-19 DIAGNOSIS — Z00.00 MEDICARE ANNUAL WELLNESS VISIT, SUBSEQUENT: ICD-10-CM

## 2022-09-19 DIAGNOSIS — Z13.29 SCREENING FOR THYROID DISORDER: ICD-10-CM

## 2022-09-19 DIAGNOSIS — E66.01 CLASS 2 SEVERE OBESITY DUE TO EXCESS CALORIES WITH SERIOUS COMORBIDITY AND BODY MASS INDEX (BMI) OF 37.0 TO 37.9 IN ADULT: ICD-10-CM

## 2022-09-19 PROCEDURE — 3288F PR FALLS RISK ASSESSMENT DOCUMENTED: ICD-10-PCS | Mod: CPTII,,, | Performed by: STUDENT IN AN ORGANIZED HEALTH CARE EDUCATION/TRAINING PROGRAM

## 2022-09-19 PROCEDURE — 3008F BODY MASS INDEX DOCD: CPT | Mod: CPTII,,, | Performed by: STUDENT IN AN ORGANIZED HEALTH CARE EDUCATION/TRAINING PROGRAM

## 2022-09-19 PROCEDURE — 4010F PR ACE/ARB THEARPY RXD/TAKEN: ICD-10-PCS | Mod: CPTII,,, | Performed by: STUDENT IN AN ORGANIZED HEALTH CARE EDUCATION/TRAINING PROGRAM

## 2022-09-19 PROCEDURE — 1101F PT FALLS ASSESS-DOCD LE1/YR: CPT | Mod: CPTII,,, | Performed by: STUDENT IN AN ORGANIZED HEALTH CARE EDUCATION/TRAINING PROGRAM

## 2022-09-19 PROCEDURE — 1126F PR PAIN SEVERITY QUANTIFIED, NO PAIN PRESENT: ICD-10-PCS | Mod: CPTII,,, | Performed by: STUDENT IN AN ORGANIZED HEALTH CARE EDUCATION/TRAINING PROGRAM

## 2022-09-19 PROCEDURE — 3288F FALL RISK ASSESSMENT DOCD: CPT | Mod: CPTII,,, | Performed by: STUDENT IN AN ORGANIZED HEALTH CARE EDUCATION/TRAINING PROGRAM

## 2022-09-19 PROCEDURE — 4010F ACE/ARB THERAPY RXD/TAKEN: CPT | Mod: CPTII,,, | Performed by: STUDENT IN AN ORGANIZED HEALTH CARE EDUCATION/TRAINING PROGRAM

## 2022-09-19 PROCEDURE — 1126F AMNT PAIN NOTED NONE PRSNT: CPT | Mod: CPTII,,, | Performed by: STUDENT IN AN ORGANIZED HEALTH CARE EDUCATION/TRAINING PROGRAM

## 2022-09-19 PROCEDURE — 3075F PR MOST RECENT SYSTOLIC BLOOD PRESS GE 130-139MM HG: ICD-10-PCS | Mod: CPTII,,, | Performed by: STUDENT IN AN ORGANIZED HEALTH CARE EDUCATION/TRAINING PROGRAM

## 2022-09-19 PROCEDURE — 3066F PR DOCUMENTATION OF TREATMENT FOR NEPHROPATHY: ICD-10-PCS | Mod: CPTII,,, | Performed by: STUDENT IN AN ORGANIZED HEALTH CARE EDUCATION/TRAINING PROGRAM

## 2022-09-19 PROCEDURE — 1101F PR PT FALLS ASSESS DOC 0-1 FALLS W/OUT INJ PAST YR: ICD-10-PCS | Mod: CPTII,,, | Performed by: STUDENT IN AN ORGANIZED HEALTH CARE EDUCATION/TRAINING PROGRAM

## 2022-09-19 PROCEDURE — 1159F MED LIST DOCD IN RCRD: CPT | Mod: CPTII,,, | Performed by: STUDENT IN AN ORGANIZED HEALTH CARE EDUCATION/TRAINING PROGRAM

## 2022-09-19 PROCEDURE — 3079F PR MOST RECENT DIASTOLIC BLOOD PRESSURE 80-89 MM HG: ICD-10-PCS | Mod: CPTII,,, | Performed by: STUDENT IN AN ORGANIZED HEALTH CARE EDUCATION/TRAINING PROGRAM

## 2022-09-19 PROCEDURE — 99213 OFFICE O/P EST LOW 20 MIN: CPT | Mod: ,,, | Performed by: STUDENT IN AN ORGANIZED HEALTH CARE EDUCATION/TRAINING PROGRAM

## 2022-09-19 PROCEDURE — 99213 PR OFFICE/OUTPT VISIT, EST, LEVL III, 20-29 MIN: ICD-10-PCS | Mod: ,,, | Performed by: STUDENT IN AN ORGANIZED HEALTH CARE EDUCATION/TRAINING PROGRAM

## 2022-09-19 PROCEDURE — 3075F SYST BP GE 130 - 139MM HG: CPT | Mod: CPTII,,, | Performed by: STUDENT IN AN ORGANIZED HEALTH CARE EDUCATION/TRAINING PROGRAM

## 2022-09-19 PROCEDURE — 3008F PR BODY MASS INDEX (BMI) DOCUMENTED: ICD-10-PCS | Mod: CPTII,,, | Performed by: STUDENT IN AN ORGANIZED HEALTH CARE EDUCATION/TRAINING PROGRAM

## 2022-09-19 PROCEDURE — 3079F DIAST BP 80-89 MM HG: CPT | Mod: CPTII,,, | Performed by: STUDENT IN AN ORGANIZED HEALTH CARE EDUCATION/TRAINING PROGRAM

## 2022-09-19 PROCEDURE — 1159F PR MEDICATION LIST DOCUMENTED IN MEDICAL RECORD: ICD-10-PCS | Mod: CPTII,,, | Performed by: STUDENT IN AN ORGANIZED HEALTH CARE EDUCATION/TRAINING PROGRAM

## 2022-09-19 PROCEDURE — 3066F NEPHROPATHY DOC TX: CPT | Mod: CPTII,,, | Performed by: STUDENT IN AN ORGANIZED HEALTH CARE EDUCATION/TRAINING PROGRAM

## 2022-09-19 RX ORDER — METOPROLOL SUCCINATE 25 MG/1
25 TABLET, EXTENDED RELEASE ORAL 2 TIMES DAILY
COMMUNITY
Start: 2022-07-20 | End: 2023-06-16 | Stop reason: SDUPTHER

## 2022-09-19 NOTE — PROGRESS NOTES
"Subjective:       Patient ID: Jesi Cha is a 71 y.o. female.    ----------------------------  HTN (hypertension)  Hypokalemia  Obesity, unspecified  Thyroid cyst     Chief Complaint: Follow-up (6 weeks f/u  for HTN and leg wound) and " wound healing" wound care in progress    Presents for f/u of RLE wound. Still following wound care closely. Wound improving slowly. Pain has significantly improved. She remains mobile with a rollator at this time. Overall says she's doing well. BP is good at today's visit.     Review of Systems   Constitutional:  Negative for chills, fever and unexpected weight change.   HENT:  Negative for sinus pressure/congestion and sore throat.    Eyes:  Negative for pain and redness.   Respiratory:  Negative for cough, shortness of breath and wheezing.    Cardiovascular:  Negative for chest pain and leg swelling.   Gastrointestinal:  Negative for abdominal pain, nausea and vomiting.   Endocrine: Negative for polydipsia and polyuria.   Genitourinary:  Negative for dysuria and hematuria.   Musculoskeletal:  Negative for arthralgias and myalgias.   Integumentary:  Negative for mole/lesion.        Chronic wound of RLE   Neurological:  Negative for dizziness, syncope and headaches.   Hematological:  Negative for adenopathy. Does not bruise/bleed easily.   Psychiatric/Behavioral:  Negative for confusion. The patient is not nervous/anxious.          Objective:      Physical Exam  Vitals and nursing note reviewed.   Constitutional:       General: She is not in acute distress.     Appearance: She is not ill-appearing.   HENT:      Nose: No congestion or rhinorrhea.      Mouth/Throat:      Mouth: Mucous membranes are moist.      Pharynx: No oropharyngeal exudate or posterior oropharyngeal erythema.   Eyes:      Extraocular Movements: Extraocular movements intact.      Conjunctiva/sclera: Conjunctivae normal.      Pupils: Pupils are equal, round, and reactive to light.   Cardiovascular:      Rate " and Rhythm: Normal rate and regular rhythm.      Pulses: Normal pulses.      Heart sounds: Murmur (grade 3/6 systolic murmur loudest at R 2nd ICS) heard.   Pulmonary:      Effort: Pulmonary effort is normal.      Breath sounds: Normal breath sounds.   Abdominal:      Palpations: Abdomen is soft. There is no mass.      Tenderness: There is no abdominal tenderness.   Musculoskeletal:         General: No deformity or signs of injury.      Right lower leg: No edema.      Left lower leg: No edema.   Lymphadenopathy:      Cervical: No cervical adenopathy.   Skin:     Comments: Two ulcers on the lateral R lower leg about 10 cm proximal to lateral malleolus, healing well (smaller than last exam), no discharge, no erythema, no elevated skin temp   Neurological:      General: No focal deficit present.      Mental Status: She is alert. Mental status is at baseline.   Psychiatric:         Mood and Affect: Mood normal.         Behavior: Behavior normal.         Assessment & Plan:   1. Chronic ulcer of right ankle with fat layer exposed  Assessment & Plan:  MRI negative for bone, tendon, etc involvement  Slowly healing, pain improved  Continue wound care f/u      2. Chronic kidney disease (CKD), stage IV (severe)  Assessment & Plan:  Best eGFR in past 3 months was 22  Continue f/u with Dr. Clemons  Continue good BP control    Orders:  -     CBC Auto Differential  -     Comprehensive Metabolic Panel  -     Lipid Panel  -     Urinalysis  -     Vitamin D    3. Class 2 severe obesity due to excess calories with serious comorbidity and body mass index (BMI) of 37.0 to 37.9 in adult  Assessment & Plan:  Down approx 15 lbs over past 3 months. Pt continuing to work on her diet, provided positive reinforcement       4. Medicare annual wellness visit, subsequent  Comments:  ordered labs today in prep for wellness at next visit  Orders:  -     CBC Auto Differential  -     Comprehensive Metabolic Panel  -     Hemoglobin A1C  -     Lipid  Panel  -     TSH  -     Urinalysis  -     Vitamin D    5. Elevated fasting glucose  -     Hemoglobin A1C    6. Screening for thyroid disorder  -     TSH      RTC 3 months for wellness

## 2022-09-21 NOTE — ASSESSMENT & PLAN NOTE
MRI negative for bone, tendon, etc involvement  Slowly healing, pain improved  Continue wound care f/u

## 2022-09-21 NOTE — ASSESSMENT & PLAN NOTE
Down approx 15 lbs over past 3 months. Pt continuing to work on her diet, provided positive reinforcement

## 2022-09-27 ENCOUNTER — EXTERNAL HOME HEALTH (OUTPATIENT)
Dept: HOME HEALTH SERVICES | Facility: HOSPITAL | Age: 72
End: 2022-09-27
Payer: MEDICARE

## 2022-10-03 ENCOUNTER — LAB VISIT (OUTPATIENT)
Dept: LAB | Facility: HOSPITAL | Age: 72
End: 2022-10-03
Attending: INTERNAL MEDICINE
Payer: MEDICARE

## 2022-10-03 DIAGNOSIS — I10 HYPERTENSION, UNSPECIFIED TYPE: ICD-10-CM

## 2022-10-03 DIAGNOSIS — N18.30 STAGE 3 CHRONIC KIDNEY DISEASE, UNSPECIFIED WHETHER STAGE 3A OR 3B CKD: ICD-10-CM

## 2022-10-03 LAB
ALBUMIN SERPL-MCNC: 3.4 GM/DL (ref 3.4–4.8)
ALBUMIN/GLOB SERPL: 0.9 RATIO (ref 1.1–2)
ALP SERPL-CCNC: 66 UNIT/L (ref 40–150)
ALT SERPL-CCNC: 15 UNIT/L (ref 0–55)
APPEARANCE UR: CLEAR
AST SERPL-CCNC: 18 UNIT/L (ref 5–34)
BACTERIA #/AREA URNS AUTO: NORMAL /HPF
BASOPHILS # BLD AUTO: 0.03 X10(3)/MCL (ref 0–0.2)
BASOPHILS NFR BLD AUTO: 0.4 %
BILIRUB UR QL STRIP.AUTO: NEGATIVE MG/DL
BILIRUBIN DIRECT+TOT PNL SERPL-MCNC: 0.3 MG/DL
BUN SERPL-MCNC: 47.6 MG/DL (ref 9.8–20.1)
CALCIUM SERPL-MCNC: 9.8 MG/DL (ref 8.4–10.2)
CHLORIDE SERPL-SCNC: 109 MMOL/L (ref 98–107)
CO2 SERPL-SCNC: 21 MMOL/L (ref 23–31)
COLOR UR AUTO: YELLOW
CREAT SERPL-MCNC: 2.74 MG/DL (ref 0.55–1.02)
CREAT UR-MCNC: 154.5 MG/DL (ref 47–110)
EOSINOPHIL # BLD AUTO: 0.26 X10(3)/MCL (ref 0–0.9)
EOSINOPHIL NFR BLD AUTO: 3.3 %
ERYTHROCYTE [DISTWIDTH] IN BLOOD BY AUTOMATED COUNT: 14.1 % (ref 11.5–17)
GFR SERPLBLD CREATININE-BSD FMLA CKD-EPI: 18 MLS/MIN/1.73/M2
GLOBULIN SER-MCNC: 3.9 GM/DL (ref 2.4–3.5)
GLUCOSE SERPL-MCNC: 126 MG/DL (ref 82–115)
GLUCOSE UR QL STRIP.AUTO: NEGATIVE MG/DL
HCT VFR BLD AUTO: 40.5 % (ref 37–47)
HGB BLD-MCNC: 13.3 GM/DL (ref 12–16)
IGA SERPL-MCNC: 111 MG/DL (ref 69–517)
IGG SERPL-MCNC: 1281 MG/DL (ref 522–1631)
IGM SERPL-MCNC: 122 MG/DL (ref 33–293)
IMM GRANULOCYTES # BLD AUTO: 0.01 X10(3)/MCL (ref 0–0.04)
IMM GRANULOCYTES NFR BLD AUTO: 0.1 %
KETONES UR QL STRIP.AUTO: NEGATIVE MG/DL
LEUKOCYTE ESTERASE UR QL STRIP.AUTO: NEGATIVE UNIT/L
LYMPHOCYTES # BLD AUTO: 3.23 X10(3)/MCL (ref 0.6–4.6)
LYMPHOCYTES NFR BLD AUTO: 41.6 %
MCH RBC QN AUTO: 28.9 PG (ref 27–31)
MCHC RBC AUTO-ENTMCNC: 32.8 MG/DL (ref 33–36)
MCV RBC AUTO: 87.9 FL (ref 80–94)
MONOCYTES # BLD AUTO: 0.51 X10(3)/MCL (ref 0.1–1.3)
MONOCYTES NFR BLD AUTO: 6.6 %
NEUTROPHILS # BLD AUTO: 3.7 X10(3)/MCL (ref 2.1–9.2)
NEUTROPHILS NFR BLD AUTO: 48 %
NITRITE UR QL STRIP.AUTO: NEGATIVE
PH UR STRIP.AUTO: 5.5 [PH]
PHOSPHATE SERPL-MCNC: 3.3 MG/DL (ref 2.3–4.7)
PLATELET # BLD AUTO: 229 X10(3)/MCL (ref 130–400)
PMV BLD AUTO: 9.7 FL (ref 7.4–10.4)
POTASSIUM SERPL-SCNC: 3.7 MMOL/L (ref 3.5–5.1)
PROT SERPL-MCNC: 7.3 GM/DL (ref 5.8–7.6)
PROT UR QL STRIP.AUTO: ABNORMAL MG/DL
PROT UR STRIP-MCNC: 19.8 MG/DL
PROT/CREAT UR-RTO: 128.2 MG/GM CR
PTH-INTACT SERPL-MCNC: 108 PG/ML (ref 8.7–77)
RBC # BLD AUTO: 4.61 X10(6)/MCL (ref 4.2–5.4)
RBC #/AREA URNS AUTO: NORMAL /HPF
RBC UR QL AUTO: NEGATIVE UNIT/L
SODIUM SERPL-SCNC: 144 MMOL/L (ref 136–145)
SP GR UR STRIP.AUTO: 1.01
SQUAMOUS #/AREA URNS AUTO: NORMAL /HPF
UROBILINOGEN UR STRIP-ACNC: 0.2 MG/DL
WBC # SPEC AUTO: 7.8 X10(3)/MCL (ref 4.5–11.5)
WBC #/AREA URNS AUTO: NORMAL /HPF

## 2022-10-03 PROCEDURE — 85025 COMPLETE CBC W/AUTO DIFF WBC: CPT

## 2022-10-03 PROCEDURE — 81001 URINALYSIS AUTO W/SCOPE: CPT

## 2022-10-03 PROCEDURE — 82570 ASSAY OF URINE CREATININE: CPT

## 2022-10-03 PROCEDURE — 84165 PROTEIN E-PHORESIS SERUM: CPT

## 2022-10-03 PROCEDURE — 83521 IG LIGHT CHAINS FREE EACH: CPT

## 2022-10-03 PROCEDURE — 84100 ASSAY OF PHOSPHORUS: CPT

## 2022-10-03 PROCEDURE — 82784 ASSAY IGA/IGD/IGG/IGM EACH: CPT

## 2022-10-03 PROCEDURE — 80053 COMPREHEN METABOLIC PANEL: CPT

## 2022-10-03 PROCEDURE — 36415 COLL VENOUS BLD VENIPUNCTURE: CPT

## 2022-10-03 PROCEDURE — 83970 ASSAY OF PARATHORMONE: CPT

## 2022-10-04 LAB
ALBUMIN % SPEP (OHS): 51.19 (ref 48.1–59.5)
ALBUMIN SERPL-MCNC: 3.7 GM/DL (ref 3.4–4.8)
ALBUMIN/GLOB SERPL: 1 RATIO (ref 1.1–2)
ALPHA 1 GLOB (OHS): 0.26 GM/DL (ref 0–0.4)
ALPHA 1 GLOB% (OHS): 3.58 (ref 2.3–4.9)
ALPHA 2 GLOB % (OHS): 11.74 (ref 6.9–13)
ALPHA 2 GLOB (OHS): 0.86 GM/DL (ref 0.4–1)
BETA GLOB (OHS): 1.05 GM/DL (ref 0.7–1.3)
BETA GLOB% (OHS): 14.35 (ref 13.8–19.7)
GAMMA GLOBULIN % (OHS): 19.14 (ref 10.1–21.9)
GAMMA GLOBULIN (OHS): 1.4 GM/DL (ref 0.4–1.8)
GLOBULIN SER-MCNC: 3.6 GM/DL (ref 2.4–3.5)
KAPPA LC FREE SER-MCNC: 8.96 MG/DL (ref 0.33–1.94)
KAPPA LC FREE/LAMBDA FREE SER: 2.39 {RATIO} (ref 0.26–1.65)
LAMBDA LC FREE SERPL-MCNC: 3.75 MG/DL (ref 0.57–2.63)
M SPIKE % (OHS): ABNORMAL
M SPIKE (OHS): ABNORMAL
PATH REV: NORMAL
PROT SERPL-MCNC: 7.3 GM/DL (ref 5.8–7.6)

## 2022-10-05 ENCOUNTER — OFFICE VISIT (OUTPATIENT)
Dept: NEPHROLOGY | Facility: CLINIC | Age: 72
End: 2022-10-05
Payer: MEDICARE

## 2022-10-05 VITALS
TEMPERATURE: 98 F | HEIGHT: 63 IN | OXYGEN SATURATION: 99 % | DIASTOLIC BLOOD PRESSURE: 100 MMHG | HEART RATE: 87 BPM | RESPIRATION RATE: 20 BRPM | WEIGHT: 201.5 LBS | SYSTOLIC BLOOD PRESSURE: 144 MMHG | BODY MASS INDEX: 35.7 KG/M2

## 2022-10-05 DIAGNOSIS — I10 HYPERTENSION, UNSPECIFIED TYPE: ICD-10-CM

## 2022-10-05 DIAGNOSIS — N18.30 STAGE 3 CHRONIC KIDNEY DISEASE, UNSPECIFIED WHETHER STAGE 3A OR 3B CKD: Primary | ICD-10-CM

## 2022-10-05 PROCEDURE — 4010F PR ACE/ARB THEARPY RXD/TAKEN: ICD-10-PCS | Mod: CPTII,S$GLB,,

## 2022-10-05 PROCEDURE — 3080F DIAST BP >= 90 MM HG: CPT | Mod: CPTII,S$GLB,,

## 2022-10-05 PROCEDURE — 3008F PR BODY MASS INDEX (BMI) DOCUMENTED: ICD-10-PCS | Mod: CPTII,S$GLB,,

## 2022-10-05 PROCEDURE — 99214 OFFICE O/P EST MOD 30 MIN: CPT | Mod: S$GLB,,,

## 2022-10-05 PROCEDURE — 3080F PR MOST RECENT DIASTOLIC BLOOD PRESSURE >= 90 MM HG: ICD-10-PCS | Mod: CPTII,S$GLB,,

## 2022-10-05 PROCEDURE — 1101F PT FALLS ASSESS-DOCD LE1/YR: CPT | Mod: CPTII,S$GLB,,

## 2022-10-05 PROCEDURE — 3288F FALL RISK ASSESSMENT DOCD: CPT | Mod: CPTII,S$GLB,,

## 2022-10-05 PROCEDURE — 99214 PR OFFICE/OUTPT VISIT, EST, LEVL IV, 30-39 MIN: ICD-10-PCS | Mod: S$GLB,,,

## 2022-10-05 PROCEDURE — 3288F PR FALLS RISK ASSESSMENT DOCUMENTED: ICD-10-PCS | Mod: CPTII,S$GLB,,

## 2022-10-05 PROCEDURE — 1126F AMNT PAIN NOTED NONE PRSNT: CPT | Mod: CPTII,S$GLB,,

## 2022-10-05 PROCEDURE — 1159F PR MEDICATION LIST DOCUMENTED IN MEDICAL RECORD: ICD-10-PCS | Mod: CPTII,S$GLB,,

## 2022-10-05 PROCEDURE — 3077F PR MOST RECENT SYSTOLIC BLOOD PRESSURE >= 140 MM HG: ICD-10-PCS | Mod: CPTII,S$GLB,,

## 2022-10-05 PROCEDURE — 3066F NEPHROPATHY DOC TX: CPT | Mod: CPTII,S$GLB,,

## 2022-10-05 PROCEDURE — 3008F BODY MASS INDEX DOCD: CPT | Mod: CPTII,S$GLB,,

## 2022-10-05 PROCEDURE — 1159F MED LIST DOCD IN RCRD: CPT | Mod: CPTII,S$GLB,,

## 2022-10-05 PROCEDURE — 1126F PR PAIN SEVERITY QUANTIFIED, NO PAIN PRESENT: ICD-10-PCS | Mod: CPTII,S$GLB,,

## 2022-10-05 PROCEDURE — 99999 PR PBB SHADOW E&M-EST. PATIENT-LVL IV: CPT | Mod: PBBFAC,,, | Performed by: INTERNAL MEDICINE

## 2022-10-05 PROCEDURE — 1101F PR PT FALLS ASSESS DOC 0-1 FALLS W/OUT INJ PAST YR: ICD-10-PCS | Mod: CPTII,S$GLB,,

## 2022-10-05 PROCEDURE — 3077F SYST BP >= 140 MM HG: CPT | Mod: CPTII,S$GLB,,

## 2022-10-05 PROCEDURE — 99999 PR PBB SHADOW E&M-EST. PATIENT-LVL IV: ICD-10-PCS | Mod: PBBFAC,,, | Performed by: INTERNAL MEDICINE

## 2022-10-05 PROCEDURE — 3066F PR DOCUMENTATION OF TREATMENT FOR NEPHROPATHY: ICD-10-PCS | Mod: CPTII,S$GLB,,

## 2022-10-05 PROCEDURE — 4010F ACE/ARB THERAPY RXD/TAKEN: CPT | Mod: CPTII,S$GLB,,

## 2022-10-05 NOTE — PROGRESS NOTES
[facility] [place] [facility] Ambulatory Progress Note      HPI  Jesi Cha, 71 y.o. female,  has a past medical history of HTN (hypertension), Hypokalemia, Obesity, unspecified, and Thyroid cyst. 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 We rechecked SIEP after hypercalcemia noted on previous labs. SIEP negative for MM and calcium has returned to normal limits.  BP at home 100s-130s/ <100. She has recently lost 25 pounds with diet and exercise. Denies any edema. No SOB/n/v. Overall feels good.    Patient denies taking NSAIDs, new antibiotics or recreational drugs. Denies recent episode of dehydration, diarrhea, vomiting, acute illness, hospitalization, recent angiograms or exposure to IV radiocontrast.      Medical History:   Past Medical History:   Diagnosis Date    HTN (hypertension)     Hypokalemia     Obesity, unspecified     Thyroid cyst        Surgical History:   Past Surgical History:   Procedure Laterality Date    APPENDECTOMY       SECTION      CHOLECYSTECTOMY         Family History:   Family History   Problem Relation Age of Onset    Arthritis Mother        Social History:   Social History     Tobacco Use    Smoking status: Never    Smokeless tobacco: Never   Substance Use Topics    Alcohol use: Never       Allergies:  Review of patient's allergies indicates:   Allergen Reactions    Codeine        Medications:    Current Outpatient Medications:     aspirin 81 mg Cap, Take 81 mg by mouth once daily at 6am., Disp: , Rfl:     atorvastatin (LIPITOR) 40 MG tablet, Take 40 mg by mouth once daily., Disp: , Rfl:     calcitRIOL (ROCALTROL) 0.25 MCG Cap, Take 0.25 mcg by mouth every other day., Disp: , Rfl:     diclofenac sodium (VOLTAREN) 1 % Gel, APPLY 4 GRAMS TOPICALLY 4 TIMES DAILY AS NEEDED FOR PAIN APPLY A THIN FILM TO EACH KNEE AS NEEDED FOR ARTHRITIS PAIN. NOT TO EXCEED 16 GRAMS/DAY/SINGLE JOINT OF LOWER EXTREMITIES., Disp: ,  Rfl:     fluticasone propionate (FLONASE) 50 mcg/actuation nasal spray, USE 1 TO 2 SPRAY(S) IN EACH NOSTRIL TWICE DAILY FOR ALLERGY SYMPTOMS, Disp: , Rfl:     hydrALAZINE (APRESOLINE) 50 MG tablet, Take 50 mg by mouth 3 (three) times daily., Disp: , Rfl:     levoFLOXacin (LEVAQUIN) 500 MG tablet, Take 500 mg by mouth once daily., Disp: , Rfl:     losartan (COZAAR) 50 MG tablet, Take 50 mg by mouth once daily at 6am., Disp: , Rfl:     metoprolol succinate (TOPROL-XL) 25 MG 24 hr tablet, Take 25 mg by mouth 2 (two) times daily., Disp: , Rfl:     mupirocin (BACTROBAN) 2 % ointment, Apply topically 2 (two) times daily. Apply thin film to affected area of leg, Disp: 15 g, Rfl: 0    NIFEdipine (PROCARDIA-XL) 60 MG (OSM) 24 hr tablet, Take 60 mg by mouth 2 (two) times a day., Disp: , Rfl:     nitroGLYCERIN (NITROSTAT) 0.4 MG SL tablet, Place 0.4 mg under the tongue., Disp: , Rfl:     spironolactone (ALDACTONE) 25 MG tablet, Take 1 tablet (25 mg total) by mouth once daily., Disp: 30 tablet, Rfl: 11    traMADoL (ULTRAM) 50 mg tablet, Take 1 tablet (50 mg total) by mouth every 12 (twelve) hours as needed for Pain., Disp: 20 tablet, Rfl: 0       ROS:    Constitutional: Denies fever, fatigue, generalized weakness, night sweats, or acute weight change  Skin: Denies wounds, no rashes, no itching, no new skin lesions  HEENT: Denies acute change in hearing or vision, tinnitus, or dysphagia  Respiratory:  Denies cough, shortness of breath, or wheezing  Cardiovascular: Denies chest pain, palpitations, or swelling  Gastrointestional: Denies abdominal pain, nausea, vomiting, diarrhea, or constipation  Genitourinary: Denies dysuria, hematuria, or incontinence; reports able to empty bladder  Musculoskeletal: Denies myalgias, back pain, decreased ROM or focal weakness  Neurological: Denies headaches, seizures, dizziness, paresthesias or weakness  Hematological: Denies unusual bruising or bleeding  Psychiatric: Denies hallucinations,  depression, or confusion      Vital Signs:  Vitals:    10/05/22 1045   BP: (!) 144/100   Pulse: 87   Resp: 20   Temp: 97.7 °F (36.5 °C)     Body mass index is 35.69 kg/m².    Repeat BP 1105: 140/94    Physical Exam:    General: no acute distress, awake, alert  Eyes: PERRLA, EOMI, conjunctiva clear, eyelids without swelling  HENT: atraumatic, oropharynx and nasal mucosa patent  Neck: full ROM, no JVD, no thyromegaly or lymphadenopathy  Respiratory: equal, unlabored, clear to auscultation A/P  Cardiovascular: RRR without murmur or rub; radial and pedal pulses felt  Edema: none  Gastrointestinal: soft, non-tender, non-distended; positive bowel sounds; no masses to palpation  Genitourinary: no CVA tenderness upon palpation  Musculoskeletal: full ROM without limitation or discomfort  Integumentary: warm, dry; no rashes, new wounds, or skin lesions; healing R low leg cellulitis, no S/S infection  Neurological: oriented, appropriate, no acute deficits      Labs:        Component Value Date/Time     10/03/2022 1324     07/28/2022 1700    K 3.7 10/03/2022 1324    K 4.1 07/28/2022 1700    CO2 21 (L) 10/03/2022 1324    CO2 24 07/28/2022 1700    BUN 47.6 (H) 10/03/2022 1324    BUN 50.1 (H) 07/28/2022 1700    CREATININE 2.74 (H) 10/03/2022 1324    CREATININE 2.79 (H) 07/28/2022 1700    EGFRNONAA 22 04/01/2022 0919    EGFRNONAA 25 12/08/2021 0915    CALCIUM 9.8 10/03/2022 1324    CALCIUM 10.3 (H) 07/28/2022 1700    PHOS 3.3 10/03/2022 1324    .0 (H) 10/03/2022 1324            Component Value Date/Time    WBC 7.8 10/03/2022 1324    WBC 7.0 07/28/2022 1700    HGB 13.3 10/03/2022 1324    HGB 14.0 07/28/2022 1700    HCT 40.5 10/03/2022 1324    HCT 42.8 07/28/2022 1700     10/03/2022 1324     07/28/2022 1700         Impression:    Patient Active Problem List   Diagnosis    Class 2 severe obesity due to excess calories with serious comorbidity and body mass index (BMI) of 37.0 to 37.9 in adult     COVID-19    Cyst of thyroid    Hypokalemia    Primary hypertension    Stage 3 chronic kidney disease    Cellulitis of right lower extremity    Mitral regurgitation    Mild aortic regurgitation    Chronic ulcer of right ankle with fat layer exposed    Impaired mobility    Chronic kidney disease (CKD), stage IV (severe)     1. Stage 3 chronic kidney disease, unspecified whether stage 3a or 3b CKD        2. 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  SIEP negative  Hypercalcemia resolved  HTN much more stable; at target at home  Will continue ARB for now due to no worsening change in renal function  Secondary hyperparathyroidism controlled on calcitriol      Plan:  Continue low-sodium diet (less than 2000 mg a day)   Continue exercise   Hydrate with water  Monitor blood pressure at home  Main line of management is control of blood pressure  Follow-up in 2 months with labs 1 week before    Avoid NSAIDs (Aleve, Mobic, Celebrex, Ibuprofen, Advil, Toradol and Diclofenac).       MYRTLE Clement

## 2022-10-22 PROCEDURE — G0179 MD RECERTIFICATION HHA PT: HCPCS | Mod: ,,, | Performed by: STUDENT IN AN ORGANIZED HEALTH CARE EDUCATION/TRAINING PROGRAM

## 2022-10-22 PROCEDURE — G0179 PR HOME HEALTH MD RECERTIFICATION: ICD-10-PCS | Mod: ,,, | Performed by: STUDENT IN AN ORGANIZED HEALTH CARE EDUCATION/TRAINING PROGRAM

## 2022-11-14 DIAGNOSIS — I10 HYPERTENSION, UNSPECIFIED TYPE: ICD-10-CM

## 2022-11-14 DIAGNOSIS — N18.30 STAGE 3 CHRONIC KIDNEY DISEASE, UNSPECIFIED WHETHER STAGE 3A OR 3B CKD: ICD-10-CM

## 2022-11-14 RX ORDER — HYDRALAZINE HYDROCHLORIDE 50 MG/1
50 TABLET, FILM COATED ORAL 3 TIMES DAILY
Qty: 90 TABLET | Refills: 2 | Status: SHIPPED | OUTPATIENT
Start: 2022-11-14 | End: 2023-06-16 | Stop reason: SDUPTHER

## 2022-11-14 RX ORDER — LOSARTAN POTASSIUM 50 MG/1
50 TABLET ORAL DAILY
Qty: 90 TABLET | Refills: 0 | Status: SHIPPED | OUTPATIENT
Start: 2022-11-14 | End: 2023-06-14 | Stop reason: SDUPTHER

## 2022-11-14 RX ORDER — SPIRONOLACTONE 25 MG/1
25 TABLET ORAL DAILY
Qty: 90 TABLET | Refills: 0 | Status: SHIPPED | OUTPATIENT
Start: 2022-11-14 | End: 2023-06-16 | Stop reason: SDUPTHER

## 2022-11-15 DIAGNOSIS — N18.30 STAGE 3 CHRONIC KIDNEY DISEASE, UNSPECIFIED WHETHER STAGE 3A OR 3B CKD: ICD-10-CM

## 2022-11-15 DIAGNOSIS — N25.81 SECONDARY HYPERPARATHYROIDISM OF RENAL ORIGIN: ICD-10-CM

## 2022-11-15 DIAGNOSIS — E83.52 HYPERCALCEMIA: Primary | ICD-10-CM

## 2022-11-15 RX ORDER — CALCITRIOL 0.25 UG/1
0.25 CAPSULE ORAL EVERY OTHER DAY
Qty: 45 CAPSULE | Refills: 0 | Status: SHIPPED | OUTPATIENT
Start: 2022-11-15 | End: 2023-02-13

## 2022-11-15 RX ORDER — NIFEDIPINE 60 MG/1
60 TABLET, EXTENDED RELEASE ORAL 2 TIMES DAILY
Qty: 180 TABLET | Refills: 0 | Status: SHIPPED | OUTPATIENT
Start: 2022-11-15 | End: 2023-06-14 | Stop reason: SDUPTHER

## 2022-12-06 ENCOUNTER — TELEPHONE (OUTPATIENT)
Dept: NEPHROLOGY | Facility: CLINIC | Age: 72
End: 2022-12-06
Payer: MEDICARE

## 2022-12-06 NOTE — TELEPHONE ENCOUNTER
Pt rescheduled appt from December 7, 2002 to December 14, 2022. Pt did not get labs done.   83 M PMH Asthma, Anxiety, BCC skin, BPH, CVA, Chronic allergic rhinitis, HTN, HLD, RA, Sz DO, w recent esophagectomt 2/2 CA w malfunctioning J-tube.  The patienet presented to [facility] from a rehab center with non-functioning J tube and SOB. He had undergone a robotic-assisted [place]-Trent esophagogastrectomy with feeding J tube placement for esophageal adenocarcinoma (T1aN0) with Dr. Dyer and Dr. Christian Weinstein on 8/14/17. After surgery, patient was found to have an anastomotic leak and was treated conservatively with NPO, antibiotics, chest tube drainage, and J tube feeding. Patient was eventually discharged to rehab. On DOA, patient's J tube was unable to be used, and he was sent to the ED for evaluation. Patient also complained of SOB, but no chest pain or abdominal pain. Denies fever or chills. Patient was taking medications by mouth, but was only getting nutrition via the J tube. Esophagram done on 9/19/17 showing  a leak at the distal site just above the hemidiaphragm into the pleural space.   Patient was found to have a Left pleural effusion, underwent a pigtail placement, developed respiratory distress, was transferred to the SICU where he was intubated. Patient also with right GINA drain.  He was then  transferred to [facility] for further management.   POD # 45 ([id number]): [place] Trent esophagectomy    Issues:              Distal esophagal leak  Respiratory failure  Hypotension  Pleural effusion  Esophageal leak  Seizure disorder              NSTEMI    Home Medications:   * No Current Medications as of 20-Sep-2017 21:03 documented in Structured Notes  · 	aspirin-dipyridamole 25 mg-200 mg oral capsule, extended release: 1 cap(s) orally 2 times a day  · 	docusate sodium 100 mg oral capsule: 1 cap(s) orally 2 times a day  · 	ProAir HFA 90 mcg/inh inhalation aerosol: 2 puff(s) inhaled 4 times a day, As Needed   · 	Vitamin B-12 500 mcg oral tablet: 1 tab(s) orally once a day  · 	amLODIPine 5 mg oral tablet: 1 tab(s) orally once a day in pm  · 	simvastatin 40 mg oral tablet: 1 tab(s) orally once a day in pm  · 	tamsulosin 0.4 mg oral capsule: 1 cap(s) orally once a day in pm  · 	Zetia 10 mg oral tablet: 1 tab(s) orally once a day in pm  · 	ALPRAZolam 0.25 mg oral tablet: 2 tab(s) orally 2 times a day  · 	olmesartan 20 mg oral tablet: 1 tab(s) orally once a day in pm  · 	montelukast 10 mg oral tablet: 1 tab(s) orally once a day in pm  · 	Dilantin 100 mg oral capsule: 2 cap(s) orally 2 times a day  · 	predniSONE 5 mg oral tablet: 1 tab(s) orally once a day in am  · 	finasteride 5 mg oral tablet: 1 tab(s) orally once a day in pm  · 	folic acid 0.4 mg oral tablet: 1 tab(s) orally once a day  · 	Vitamin B6 100 mg oral tablet: 1 tab(s) orally once a day    MEDICATIONS  (STANDING):  chlorhexidine 4% Liquid 1 Application(s) Topical daily  folic acid Injectable 1 milliGRAM(s) IV Push daily  pyridoxine Injectable 100 milliGRAM(s) IV Push daily  cyanocobalamin Injectable 500 MICROGram(s) SubCutaneous daily  pantoprazole  Injectable 40 milliGRAM(s) IV Push two times a day  methylPREDNISolone sodium succinate Injectable 20 milliGRAM(s) IV Push two times a day  fluticasone propionate   220 MICROgram(s) HFA Inhaler 1 Puff(s) Inhalation two times a day  sodium chloride 0.9%. 1000 milliLiter(s) (10 mL/Hr) IV Continuous <Continuous>  aspirin Suppository 300 milliGRAM(s) Rectal daily  ALBUTerol/ipratropium for Nebulization 3 milliLiter(s) Nebulizer every 6 hours  dornase ziyad Solution 2.5 milliGRAM(s) Inhalation two times a day  fosphenytoin IVPB 200 milliGRAM(s) PE IV Intermittent every 12 hours  metoprolol Injectable 2.5 milliGRAM(s) IV Push every 6 hours  nystatin Powder 1 Application(s) Topical two times a day    MEDICATIONS  (PRN):  guaiFENesin    Syrup 200 milliGRAM(s) Oral every 6 hours PRN Cough  HYDROcodone/homatropine Syrup 5 milliLiter(s) Oral every 4 hours PRN Cough  melatonin 3 milliGRAM(s) Oral at bedtime PRN Insomnia  LORazepam   Injectable 0.25 milliGRAM(s) IV Push two times a day PRN Anxiety      ICU Vital Signs Last 24 Hrs  T(C): 36.6 (28 Sep 2017 08:00), Max: 36.7 (27 Sep 2017 20:00)  T(F): 97.8 (28 Sep 2017 08:00), Max: 98.1 (27 Sep 2017 20:00)  HR: 82 (28 Sep 2017 08:00) (77 - 93)  BP: 129/77 (28 Sep 2017 08:00) (107/57 - 149/51)  BP(mean): 90 (28 Sep 2017 08:00) (68 - 90)  ABP: --  ABP(mean): --  RR: 25 (28 Sep 2017 08:00) (20 - 27)  SpO2: 100% (28 Sep 2017 08:00) (97% - 100%)      Physical exam:                                                   Neuro:      Alert & nonfocal    /  Confused   /  Sedated                          Cardiovascular:  S1 & S2, regular / irregular                          Respiratory:   Air entry is fair and equal on both sides, has bilateral conducted sounds / rhonchi /rales                          GI:  Soft, nondistended and nontender, Bowel sounds active        Distended / tender                          Ext: No cyanosis or edema,   bilateral pedal edema    I&O's Summary    27 Sep 2017 07:01  -  28 Sep 2017 07:00  --------------------------------------------------------  IN: 2640 mL / OUT: 1865 mL / NET: 775 mL    28 Sep 2017 07:01  -  28 Sep 2017 09:34  --------------------------------------------------------  IN: 160 mL / OUT: 400 mL / NET: -240 mL    Labs:                                                                        9.0    8.35  )-----------( 183      ( 28 Sep 2017 04:20 )             28.3                            09-28    137  |  102  |  16  ----------------------------<  150<H>  4.0   |  29  |  0.47<L>    Ca    8.0<L>      28 Sep 2017 04:20  Phos  2.7     09-28  Mg     1.9     09-28    CAPILLARY BLOOD GLUCOSE  108 (27 Sep 2017 00:00)    Plan:  83 M PMH Asthma, Anxiety, BCC skin, BPH, CVA, Chronic allergic rhinitis, HTN, HLD, RA, Sz DO, w recent esophagectomt 2/2 CA w malfunctioning J-tube.  The patienet presented to [facility] from a rehab center with non-functioning J tube and SOB. He had undergone a robotic-assisted [place]-Trent esophagogastrectomy with feeding J tube placement for esophageal adenocarcinoma (T1aN0) with Dr. Dyer and Dr. Christian Weinstein on 8/14/17. After surgery, patient was found to have an anastomotic leak and was treated conservatively with NPO, antibiotics, chest tube drainage, and J tube feeding. Patient was eventually discharged to rehab. On DOA, patient's J tube was unable to be used, and he was sent to the ED for evaluation. Patient also complained of SOB, but no chest pain or abdominal pain. Denies fever or chills. Patient was taking medications by mouth, but was only getting nutrition via the J tube. Esophagram done on 9/19/17 showing  a leak at the distal site just above the hemidiaphragm into the pleural space.   Patient was found to have a Left pleural effusion, underwent a pigtail placement, developed respiratory distress, was transferred to the SICU where he was intubated. Patient also with right GINA drain.  He was then  transferred to [facility] CT[facility] for further management.   POD # 45 ([id number]): [place] esophagectomy    Issues:              Distal esophagal leak  Respiratory failure  Hypotension  Pleural effusion  Esophageal leak  Seizure disorder              NSTEMI                            Neuro:                                          Pain control with Fentanyl / Tylenol PRN     Seizure disorder - Continue Fosphenytoin                            Cardiovascular:                                          Continue hemodynamic monitoring.    CAD: On ASA, No Plavix. Continue lopressor                                Respiratory:                                         Asthma: Continue Solumedrol, Duonebs, pulmozume     Persistent cough - Consider bronch to r/o TEF                                          Loculated R-effusion - A new pigtail inserted & drained 200 cc serous fluid                                                                pulmonary toilet     R-Pigtail to suction                            GI                                         NPO     EGD - contained perforation of stomach & leak near anastomosis site                                          Continue GI prophylaxis with Protonix                                         Tolerating J-tube feeds 	                                                                 Renal:                                         Monitor I/Os and electrolytes                                         López                                                  Hem/ Onc:                                         Monitor chest tube output                            Infectious disease:                                            Discontinue Vanco / Cefipime / Flagyly / Micafungin                            Endocrine                                             Continue Accu-Checks with coverage.     All available pertinent clinical, laboratory, radiographic, hemodynamic, echocardiographic, respiratory data, microbiologic data and chart were reviewed and analyzed frequently throughout the course of the day and night  Patient seen, examined and plan discussed with CT Surgeon Dr. Weinstein / CTICU team during rounds.      I have spent  60 minutes of critical care time with this patient between 7am/pm and 7pm/am.    Aleksandr Hayden MD

## 2022-12-08 ENCOUNTER — EXTERNAL HOME HEALTH (OUTPATIENT)
Dept: HOME HEALTH SERVICES | Facility: HOSPITAL | Age: 72
End: 2022-12-08
Payer: MEDICARE

## 2022-12-08 ENCOUNTER — LAB VISIT (OUTPATIENT)
Dept: LAB | Facility: HOSPITAL | Age: 72
End: 2022-12-08
Payer: MEDICARE

## 2022-12-08 DIAGNOSIS — I10 HYPERTENSION, UNSPECIFIED TYPE: ICD-10-CM

## 2022-12-08 DIAGNOSIS — N18.30 STAGE 3 CHRONIC KIDNEY DISEASE, UNSPECIFIED WHETHER STAGE 3A OR 3B CKD: ICD-10-CM

## 2022-12-08 LAB
ALBUMIN SERPL-MCNC: 3.9 GM/DL (ref 3.4–4.8)
ALBUMIN/GLOB SERPL: 1.1 RATIO (ref 1.1–2)
ALP SERPL-CCNC: 74 UNIT/L (ref 40–150)
ALT SERPL-CCNC: 15 UNIT/L (ref 0–55)
APPEARANCE UR: CLEAR
AST SERPL-CCNC: 18 UNIT/L (ref 5–34)
BACTERIA #/AREA URNS AUTO: ABNORMAL /HPF
BASOPHILS # BLD AUTO: 0.02 X10(3)/MCL (ref 0–0.2)
BASOPHILS NFR BLD AUTO: 0.3 %
BILIRUB UR QL STRIP.AUTO: NEGATIVE MG/DL
BILIRUBIN DIRECT+TOT PNL SERPL-MCNC: 0.5 MG/DL
BUN SERPL-MCNC: 51.6 MG/DL (ref 9.8–20.1)
CALCIUM SERPL-MCNC: 10.3 MG/DL (ref 8.4–10.2)
CHLORIDE SERPL-SCNC: 105 MMOL/L (ref 98–107)
CO2 SERPL-SCNC: 26 MMOL/L (ref 23–31)
COLOR UR AUTO: YELLOW
CREAT SERPL-MCNC: 2.39 MG/DL (ref 0.55–1.02)
CREAT UR-MCNC: 81 MG/DL (ref 47–110)
EOSINOPHIL # BLD AUTO: 0.21 X10(3)/MCL (ref 0–0.9)
EOSINOPHIL NFR BLD AUTO: 3.6 %
ERYTHROCYTE [DISTWIDTH] IN BLOOD BY AUTOMATED COUNT: 14.6 % (ref 11.5–17)
GFR SERPLBLD CREATININE-BSD FMLA CKD-EPI: 21 MLS/MIN/1.73/M2
GLOBULIN SER-MCNC: 3.6 GM/DL (ref 2.4–3.5)
GLUCOSE SERPL-MCNC: 92 MG/DL (ref 82–115)
GLUCOSE UR QL STRIP.AUTO: NEGATIVE MG/DL
HCT VFR BLD AUTO: 41.8 % (ref 37–47)
HGB BLD-MCNC: 13.3 GM/DL (ref 12–16)
IMM GRANULOCYTES # BLD AUTO: 0 X10(3)/MCL (ref 0–0.04)
IMM GRANULOCYTES NFR BLD AUTO: 0 %
KETONES UR QL STRIP.AUTO: NEGATIVE MG/DL
LEUKOCYTE ESTERASE UR QL STRIP.AUTO: NEGATIVE UNIT/L
LYMPHOCYTES # BLD AUTO: 1.81 X10(3)/MCL (ref 0.6–4.6)
LYMPHOCYTES NFR BLD AUTO: 30.9 %
MCH RBC QN AUTO: 28.7 PG (ref 27–31)
MCHC RBC AUTO-ENTMCNC: 31.8 MG/DL (ref 33–36)
MCV RBC AUTO: 90.1 FL (ref 80–94)
MONOCYTES # BLD AUTO: 0.46 X10(3)/MCL (ref 0.1–1.3)
MONOCYTES NFR BLD AUTO: 7.9 %
NEUTROPHILS # BLD AUTO: 3.4 X10(3)/MCL (ref 2.1–9.2)
NEUTROPHILS NFR BLD AUTO: 57.3 %
NITRITE UR QL STRIP.AUTO: NEGATIVE
PH UR STRIP.AUTO: 7 [PH]
PHOSPHATE SERPL-MCNC: 3.8 MG/DL (ref 2.3–4.7)
PLATELET # BLD AUTO: 220 X10(3)/MCL (ref 130–400)
PMV BLD AUTO: 9.9 FL (ref 7.4–10.4)
POTASSIUM SERPL-SCNC: 4.6 MMOL/L (ref 3.5–5.1)
PROT SERPL-MCNC: 7.5 GM/DL (ref 5.8–7.6)
PROT UR QL STRIP.AUTO: 30 MG/DL
PROT UR STRIP-MCNC: 22.3 MG/DL
PTH-INTACT SERPL-MCNC: 102.9 PG/ML (ref 8.7–77)
RBC # BLD AUTO: 4.64 X10(6)/MCL (ref 4.2–5.4)
RBC #/AREA URNS AUTO: ABNORMAL /HPF
RBC UR QL AUTO: NEGATIVE UNIT/L
SODIUM SERPL-SCNC: 144 MMOL/L (ref 136–145)
SP GR UR STRIP.AUTO: 1.02
SQUAMOUS #/AREA URNS AUTO: ABNORMAL /HPF
UROBILINOGEN UR STRIP-ACNC: 0.2 MG/DL
WBC # SPEC AUTO: 5.9 X10(3)/MCL (ref 4.5–11.5)
WBC #/AREA URNS AUTO: ABNORMAL /HPF

## 2022-12-08 PROCEDURE — 82570 ASSAY OF URINE CREATININE: CPT

## 2022-12-08 PROCEDURE — 84156 ASSAY OF PROTEIN URINE: CPT

## 2022-12-08 PROCEDURE — 81001 URINALYSIS AUTO W/SCOPE: CPT

## 2022-12-08 PROCEDURE — 80053 COMPREHEN METABOLIC PANEL: CPT

## 2022-12-08 PROCEDURE — 81003 URINALYSIS AUTO W/O SCOPE: CPT

## 2022-12-08 PROCEDURE — 84100 ASSAY OF PHOSPHORUS: CPT

## 2022-12-08 PROCEDURE — 36415 COLL VENOUS BLD VENIPUNCTURE: CPT

## 2022-12-08 PROCEDURE — 83970 ASSAY OF PARATHORMONE: CPT

## 2022-12-08 PROCEDURE — 85025 COMPLETE CBC W/AUTO DIFF WBC: CPT

## 2022-12-14 ENCOUNTER — OFFICE VISIT (OUTPATIENT)
Dept: NEPHROLOGY | Facility: CLINIC | Age: 72
End: 2022-12-14
Payer: MEDICARE

## 2022-12-14 VITALS
HEIGHT: 63 IN | HEART RATE: 88 BPM | RESPIRATION RATE: 20 BRPM | BODY MASS INDEX: 36.17 KG/M2 | WEIGHT: 204.13 LBS | DIASTOLIC BLOOD PRESSURE: 90 MMHG | SYSTOLIC BLOOD PRESSURE: 150 MMHG | OXYGEN SATURATION: 97 % | TEMPERATURE: 98 F

## 2022-12-14 DIAGNOSIS — I10 HYPERTENSION, UNSPECIFIED TYPE: ICD-10-CM

## 2022-12-14 DIAGNOSIS — N18.30 STAGE 3 CHRONIC KIDNEY DISEASE, UNSPECIFIED WHETHER STAGE 3A OR 3B CKD: Primary | ICD-10-CM

## 2022-12-14 PROCEDURE — 99999 PR PBB SHADOW E&M-EST. PATIENT-LVL IV: ICD-10-PCS | Mod: PBBFAC,,, | Performed by: INTERNAL MEDICINE

## 2022-12-14 PROCEDURE — 3080F PR MOST RECENT DIASTOLIC BLOOD PRESSURE >= 90 MM HG: ICD-10-PCS | Mod: CPTII,S$GLB,, | Performed by: INTERNAL MEDICINE

## 2022-12-14 PROCEDURE — 3288F PR FALLS RISK ASSESSMENT DOCUMENTED: ICD-10-PCS | Mod: CPTII,S$GLB,, | Performed by: INTERNAL MEDICINE

## 2022-12-14 PROCEDURE — 3066F PR DOCUMENTATION OF TREATMENT FOR NEPHROPATHY: ICD-10-PCS | Mod: CPTII,S$GLB,, | Performed by: INTERNAL MEDICINE

## 2022-12-14 PROCEDURE — 3288F FALL RISK ASSESSMENT DOCD: CPT | Mod: CPTII,S$GLB,, | Performed by: INTERNAL MEDICINE

## 2022-12-14 PROCEDURE — 1101F PR PT FALLS ASSESS DOC 0-1 FALLS W/OUT INJ PAST YR: ICD-10-PCS | Mod: CPTII,S$GLB,, | Performed by: INTERNAL MEDICINE

## 2022-12-14 PROCEDURE — 1126F PR PAIN SEVERITY QUANTIFIED, NO PAIN PRESENT: ICD-10-PCS | Mod: CPTII,S$GLB,, | Performed by: INTERNAL MEDICINE

## 2022-12-14 PROCEDURE — 3008F PR BODY MASS INDEX (BMI) DOCUMENTED: ICD-10-PCS | Mod: CPTII,S$GLB,, | Performed by: INTERNAL MEDICINE

## 2022-12-14 PROCEDURE — 99214 OFFICE O/P EST MOD 30 MIN: CPT | Mod: S$GLB,,, | Performed by: INTERNAL MEDICINE

## 2022-12-14 PROCEDURE — 1159F PR MEDICATION LIST DOCUMENTED IN MEDICAL RECORD: ICD-10-PCS | Mod: CPTII,S$GLB,, | Performed by: INTERNAL MEDICINE

## 2022-12-14 PROCEDURE — 99214 PR OFFICE/OUTPT VISIT, EST, LEVL IV, 30-39 MIN: ICD-10-PCS | Mod: S$GLB,,, | Performed by: INTERNAL MEDICINE

## 2022-12-14 PROCEDURE — 3080F DIAST BP >= 90 MM HG: CPT | Mod: CPTII,S$GLB,, | Performed by: INTERNAL MEDICINE

## 2022-12-14 PROCEDURE — 3077F PR MOST RECENT SYSTOLIC BLOOD PRESSURE >= 140 MM HG: ICD-10-PCS | Mod: CPTII,S$GLB,, | Performed by: INTERNAL MEDICINE

## 2022-12-14 PROCEDURE — 3077F SYST BP >= 140 MM HG: CPT | Mod: CPTII,S$GLB,, | Performed by: INTERNAL MEDICINE

## 2022-12-14 PROCEDURE — 1159F MED LIST DOCD IN RCRD: CPT | Mod: CPTII,S$GLB,, | Performed by: INTERNAL MEDICINE

## 2022-12-14 PROCEDURE — 1101F PT FALLS ASSESS-DOCD LE1/YR: CPT | Mod: CPTII,S$GLB,, | Performed by: INTERNAL MEDICINE

## 2022-12-14 PROCEDURE — 99999 PR PBB SHADOW E&M-EST. PATIENT-LVL IV: CPT | Mod: PBBFAC,,, | Performed by: INTERNAL MEDICINE

## 2022-12-14 PROCEDURE — 1126F AMNT PAIN NOTED NONE PRSNT: CPT | Mod: CPTII,S$GLB,, | Performed by: INTERNAL MEDICINE

## 2022-12-14 PROCEDURE — 4010F PR ACE/ARB THEARPY RXD/TAKEN: ICD-10-PCS | Mod: CPTII,S$GLB,, | Performed by: INTERNAL MEDICINE

## 2022-12-14 PROCEDURE — 3008F BODY MASS INDEX DOCD: CPT | Mod: CPTII,S$GLB,, | Performed by: INTERNAL MEDICINE

## 2022-12-14 PROCEDURE — 3066F NEPHROPATHY DOC TX: CPT | Mod: CPTII,S$GLB,, | Performed by: INTERNAL MEDICINE

## 2022-12-14 PROCEDURE — 4010F ACE/ARB THERAPY RXD/TAKEN: CPT | Mod: CPTII,S$GLB,, | Performed by: INTERNAL MEDICINE

## 2022-12-14 NOTE — PROGRESS NOTES
[facility] [place] [facility] Progress Note      HPI  Jesi Cha, 72 y.o. female,  has a past medical history of HTN (hypertension), Hypokalemia, Obesity, unspecified, and Thyroid cyst. 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 SPEP negative    Patient denies taking NSAIDs, new antibiotics or recreational drugs. Denies recent episode of dehydration, diarrhea, vomiting, acute illness, hospitalization, recent angiograms or exposure to IV radiocontrast.      Medical History:   Past Medical History:   Diagnosis Date    HTN (hypertension)     Hypokalemia     Obesity, unspecified     Thyroid cyst        Surgical History:   Past Surgical History:   Procedure Laterality Date    APPENDECTOMY       SECTION      CHOLECYSTECTOMY         Family History:   Family History   Problem Relation Age of Onset    Arthritis Mother        Social History:   Social History     Tobacco Use    Smoking status: Never    Smokeless tobacco: Never   Substance Use Topics    Alcohol use: Never       Allergies:  Review of patient's allergies indicates:   Allergen Reactions    Codeine        Medications:    Current Outpatient Medications:     aspirin 81 mg Cap, Take 81 mg by mouth once daily at 6am., Disp: , Rfl:     calcitRIOL (ROCALTROL) 0.25 MCG Cap, Take 1 capsule (0.25 mcg total) by mouth every other day., Disp: 45 capsule, Rfl: 0    diclofenac sodium (VOLTAREN) 1 % Gel, APPLY 4 GRAMS TOPICALLY 4 TIMES DAILY AS NEEDED FOR PAIN APPLY A THIN FILM TO EACH KNEE AS NEEDED FOR ARTHRITIS PAIN. NOT TO EXCEED 16 GRAMS/DAY/SINGLE JOINT OF LOWER EXTREMITIES., Disp: , Rfl:     fluticasone propionate (FLONASE) 50 mcg/actuation nasal spray, USE 1 TO 2 SPRAY(S) IN EACH NOSTRIL TWICE DAILY FOR ALLERGY SYMPTOMS, Disp: , Rfl:     hydrALAZINE (APRESOLINE) 50 MG tablet, Take 1 tablet (50 mg total) by mouth 3 (three) times daily., Disp: 90 tablet, Rfl: 2    losartan (COZAAR) 50  "MG tablet, Take 1 tablet (50 mg total) by mouth once daily., Disp: 90 tablet, Rfl: 0    metoprolol succinate (TOPROL-XL) 25 MG 24 hr tablet, Take 25 mg by mouth 2 (two) times daily., Disp: , Rfl:     mupirocin (BACTROBAN) 2 % ointment, Apply topically 2 (two) times daily. Apply thin film to affected area of leg, Disp: 15 g, Rfl: 0    NIFEdipine (PROCARDIA-XL) 60 MG (OSM) 24 hr tablet, Take 1 tablet (60 mg total) by mouth 2 (two) times a day., Disp: 180 tablet, Rfl: 0    nitroGLYCERIN (NITROSTAT) 0.4 MG SL tablet, Place 0.4 mg under the tongue., Disp: , Rfl:     spironolactone (ALDACTONE) 25 MG tablet, Take 1 tablet (25 mg total) by mouth once daily., Disp: 90 tablet, Rfl: 0       ROS:    Constitutional: Denies fever, fatigue, generalized weakness, night sweats, or acute weight change  Skin: Denies wounds, no rashes, no itching, no new skin lesions  HEENT: Denies acute change in hearing or vision, tinnitus, or dysphagia  Respiratory:  Denies cough, shortness of breath, or wheezing  Cardiovascular: Denies chest pain, palpitations, or worsening swelling  Gastrointestional: Denies abdominal pain, nausea, vomiting, diarrhea, or constipation  Genitourinary: Denies dysuria, hematuria, or incontinence; reports able to empty bladder  Musculoskeletal: Denies myalgias, back pain, decreased ROM or focal weakness  Neurological: Denies headaches, seizures, dizziness, paresthesias or weakness  Hematological: Denies unusual bruising or bleeding  Psychiatric: Denies hallucinations, depression, or confusion      Vital Signs:  BP (!) 150/90 (BP Location: Left arm, Patient Position: Sitting)   Pulse 88   Temp 97.7 °F (36.5 °C) (Temporal)   Resp 20   Ht 5' 3" (1.6 m)   Wt 92.6 kg (204 lb 2.3 oz)   SpO2 97%   BMI 36.16 kg/m²   Body mass index is 36.16 kg/m².      Physical Exam:    General: no acute distress, awake, alert  Eyes: PERRLA, EOMI, conjunctiva clear, eyelids without swelling  HENT: atraumatic, oropharynx and nasal mucosa " patent  Neck: full ROM, no JVD, no thyromegaly or lymphadenopathy  Respiratory: equal, unlabored, clear to auscultation A/P  Cardiovascular: RRR without rub; radial and pedal pulses felt  Edema: tr lower ext  Gastrointestinal: soft, non-tender, non-distended; positive bowel sounds; no masses to palpation    Musculoskeletal: full ROM without limitation or discomfort  Integumentary: warm, dry; no rashes, wounds, or skin lesions  Neurological: oriented, appropriate, no acute deficits      Labs:        Component Value Date/Time     12/08/2022 1213     10/03/2022 1324    K 4.6 12/08/2022 1213    K 3.7 10/03/2022 1324    CO2 26 12/08/2022 1213    CO2 21 (L) 10/03/2022 1324    BUN 51.6 (H) 12/08/2022 1213    BUN 47.6 (H) 10/03/2022 1324    CREATININE 2.39 (H) 12/08/2022 1213    CREATININE 2.74 (H) 10/03/2022 1324    EGFRNONAA 22 04/01/2022 0919    EGFRNONAA 25 12/08/2021 0915    CALCIUM 10.3 (H) 12/08/2022 1213    CALCIUM 9.8 10/03/2022 1324    PHOS 3.8 12/08/2022 1213    .9 (H) 12/08/2022 1213            Component Value Date/Time    WBC 5.9 12/08/2022 1213    WBC 7.8 10/03/2022 1324    HGB 13.3 12/08/2022 1213    HGB 13.3 10/03/2022 1324    HCT 41.8 12/08/2022 1213    HCT 40.5 10/03/2022 1324     12/08/2022 1213     10/03/2022 1324         Impression:    Patient Active Problem List   Diagnosis    Class 2 severe obesity due to excess calories with serious comorbidity and body mass index (BMI) of 37.0 to 37.9 in adult    COVID-19    Cyst of thyroid    Hypokalemia    Primary hypertension    Stage 3 chronic kidney disease    Cellulitis of right lower extremity    Mitral regurgitation    Mild aortic regurgitation    Chronic ulcer of right ankle with fat layer exposed    Impaired mobility    Chronic kidney disease (CKD), stage IV (severe)     1. Stage 3 chronic kidney disease, unspecified whether stage 3a or 3b CKD        2. Hypertension, unspecified type          CKD3b related to  nephrosclerosis  HTN: better readings at home; elevated after driving to office    Plan:  Low salt diet  Monitor BP closely-- mainline of management of BP control  FU in 3 months with labs within 1 week before    Avoid NSAIDs (Aleve, Mobic, Celebrex, Ibuprofen, Advil, Toradol and Diclofenac).         MD Peter Ribeiro FNP

## 2022-12-17 ENCOUNTER — LAB VISIT (OUTPATIENT)
Dept: LAB | Facility: HOSPITAL | Age: 72
End: 2022-12-17
Attending: STUDENT IN AN ORGANIZED HEALTH CARE EDUCATION/TRAINING PROGRAM
Payer: MEDICARE

## 2022-12-17 DIAGNOSIS — Z00.00 MEDICARE ANNUAL WELLNESS VISIT, SUBSEQUENT: ICD-10-CM

## 2022-12-17 DIAGNOSIS — N18.4 CHRONIC KIDNEY DISEASE (CKD), STAGE IV (SEVERE): ICD-10-CM

## 2022-12-17 DIAGNOSIS — Z13.29 SCREENING FOR THYROID DISORDER: ICD-10-CM

## 2022-12-17 DIAGNOSIS — R73.01 ELEVATED FASTING GLUCOSE: ICD-10-CM

## 2022-12-17 LAB
ALBUMIN SERPL-MCNC: 3.9 G/DL (ref 3.4–4.8)
ALBUMIN/GLOB SERPL: 1.1 RATIO (ref 1.1–2)
ALP SERPL-CCNC: 62 UNIT/L (ref 40–150)
ALT SERPL-CCNC: 14 UNIT/L (ref 0–55)
APPEARANCE UR: CLEAR
AST SERPL-CCNC: 19 UNIT/L (ref 5–34)
BACTERIA #/AREA URNS AUTO: ABNORMAL /HPF
BASOPHILS # BLD AUTO: 0.02 X10(3)/MCL (ref 0–0.2)
BASOPHILS NFR BLD AUTO: 0.4 %
BILIRUB UR QL STRIP.AUTO: NEGATIVE MG/DL
BILIRUBIN DIRECT+TOT PNL SERPL-MCNC: 0.6 MG/DL
BUN SERPL-MCNC: 41.7 MG/DL (ref 9.8–20.1)
CALCIUM SERPL-MCNC: 10 MG/DL (ref 8.4–10.2)
CHLORIDE SERPL-SCNC: 109 MMOL/L (ref 98–107)
CHOLEST SERPL-MCNC: 152 MG/DL
CHOLEST/HDLC SERPL: 2 {RATIO} (ref 0–5)
CO2 SERPL-SCNC: 24 MMOL/L (ref 23–31)
COLOR UR AUTO: YELLOW
CREAT SERPL-MCNC: 2.55 MG/DL (ref 0.55–1.02)
DEPRECATED CALCIDIOL+CALCIFEROL SERPL-MC: 47 NG/ML (ref 30–80)
EOSINOPHIL # BLD AUTO: 0.16 X10(3)/MCL (ref 0–0.9)
EOSINOPHIL NFR BLD AUTO: 3.1 %
ERYTHROCYTE [DISTWIDTH] IN BLOOD BY AUTOMATED COUNT: 14.3 % (ref 11–14.5)
EST. AVERAGE GLUCOSE BLD GHB EST-MCNC: 91.1 MG/DL
GFR SERPLBLD CREATININE-BSD FMLA CKD-EPI: 20 MLS/MIN/1.73/M2
GLOBULIN SER-MCNC: 3.5 GM/DL (ref 2.4–3.5)
GLUCOSE SERPL-MCNC: 87 MG/DL (ref 82–115)
GLUCOSE UR QL STRIP.AUTO: NEGATIVE MG/DL
HBA1C MFR BLD: 4.8 %
HCT VFR BLD AUTO: 42.8 % (ref 37–47)
HDLC SERPL-MCNC: 66 MG/DL (ref 35–60)
HGB BLD-MCNC: 13.4 GM/DL (ref 12–16)
IMM GRANULOCYTES # BLD AUTO: 0.01 X10(3)/MCL (ref 0–0.04)
IMM GRANULOCYTES NFR BLD AUTO: 0.2 %
KETONES UR QL STRIP.AUTO: NEGATIVE MG/DL
LDLC SERPL CALC-MCNC: 79 MG/DL (ref 50–140)
LEUKOCYTE ESTERASE UR QL STRIP.AUTO: NEGATIVE UNIT/L
LYMPHOCYTES # BLD AUTO: 2.01 X10(3)/MCL (ref 0.6–4.6)
LYMPHOCYTES NFR BLD AUTO: 38.4 %
MCH RBC QN AUTO: 28.6 PG
MCHC RBC AUTO-ENTMCNC: 31.3 MG/DL (ref 33–36)
MCV RBC AUTO: 91.3 FL (ref 80–94)
MONOCYTES # BLD AUTO: 0.44 X10(3)/MCL (ref 0.1–1.3)
MONOCYTES NFR BLD AUTO: 8.4 %
NEUTROPHILS # BLD AUTO: 2.59 X10(3)/MCL (ref 2.1–9.2)
NEUTROPHILS NFR BLD AUTO: 49.5 %
NITRITE UR QL STRIP.AUTO: NEGATIVE
PH UR STRIP.AUTO: 6.5 [PH]
PLATELET # BLD AUTO: 203 X10(3)/MCL (ref 140–371)
PMV BLD AUTO: 10.3 FL (ref 9.4–12.4)
POTASSIUM SERPL-SCNC: 4 MMOL/L (ref 3.5–5.1)
PROT SERPL-MCNC: 7.4 GM/DL (ref 5.8–7.6)
PROT UR QL STRIP.AUTO: >=300 MG/DL
RBC # BLD AUTO: 4.69 X10(6)/MCL (ref 4.2–5.4)
RBC #/AREA URNS AUTO: ABNORMAL /HPF
RBC UR QL AUTO: NEGATIVE UNIT/L
SODIUM SERPL-SCNC: 145 MMOL/L (ref 136–145)
SP GR UR STRIP.AUTO: 1.02
SQUAMOUS #/AREA URNS AUTO: ABNORMAL /HPF
TRIGL SERPL-MCNC: 35 MG/DL (ref 37–140)
TSH SERPL-ACNC: 0.81 UIU/ML (ref 0.35–4.94)
UROBILINOGEN UR STRIP-ACNC: 0.2 MG/DL
VLDLC SERPL CALC-MCNC: 7 MG/DL
WBC # SPEC AUTO: 5.2 X10(3)/MCL (ref 4.5–11.5)
WBC #/AREA URNS AUTO: ABNORMAL /HPF

## 2022-12-17 PROCEDURE — 85025 COMPLETE CBC W/AUTO DIFF WBC: CPT

## 2022-12-17 PROCEDURE — 80061 LIPID PANEL: CPT

## 2022-12-17 PROCEDURE — 81003 URINALYSIS AUTO W/O SCOPE: CPT

## 2022-12-17 PROCEDURE — 83036 HEMOGLOBIN GLYCOSYLATED A1C: CPT

## 2022-12-17 PROCEDURE — 84443 ASSAY THYROID STIM HORMONE: CPT

## 2022-12-17 PROCEDURE — 81001 URINALYSIS AUTO W/SCOPE: CPT

## 2022-12-17 PROCEDURE — 36415 COLL VENOUS BLD VENIPUNCTURE: CPT

## 2022-12-17 PROCEDURE — 82306 VITAMIN D 25 HYDROXY: CPT

## 2022-12-17 PROCEDURE — 80053 COMPREHEN METABOLIC PANEL: CPT

## 2022-12-19 ENCOUNTER — OFFICE VISIT (OUTPATIENT)
Dept: PRIMARY CARE CLINIC | Facility: CLINIC | Age: 72
End: 2022-12-19
Payer: MEDICARE

## 2022-12-19 VITALS
OXYGEN SATURATION: 97 % | DIASTOLIC BLOOD PRESSURE: 82 MMHG | TEMPERATURE: 98 F | BODY MASS INDEX: 35.76 KG/M2 | RESPIRATION RATE: 20 BRPM | SYSTOLIC BLOOD PRESSURE: 130 MMHG | WEIGHT: 201.81 LBS | HEART RATE: 89 BPM | HEIGHT: 63 IN

## 2022-12-19 DIAGNOSIS — I49.8 SINUS ARRHYTHMIA: ICD-10-CM

## 2022-12-19 DIAGNOSIS — N95.9 MENOPAUSAL AND POSTMENOPAUSAL DISORDER: ICD-10-CM

## 2022-12-19 DIAGNOSIS — Z91.89 RISK FOR CORONARY ARTERY DISEASE GREATER THAN 10% IN NEXT 10 YEARS: ICD-10-CM

## 2022-12-19 DIAGNOSIS — Z12.31 SCREENING MAMMOGRAM FOR BREAST CANCER: ICD-10-CM

## 2022-12-19 DIAGNOSIS — Z00.00 MEDICARE ANNUAL WELLNESS VISIT, SUBSEQUENT: Primary | ICD-10-CM

## 2022-12-19 DIAGNOSIS — Z13.820 SCREENING FOR OSTEOPOROSIS: ICD-10-CM

## 2022-12-19 DIAGNOSIS — Z13.6 SCREENING FOR CARDIOVASCULAR CONDITION: ICD-10-CM

## 2022-12-19 LAB
EKG 12-LEAD: NORMAL
PR INTERVAL: NORMAL
PRT AXES: NORMAL
QRS DURATION: NORMAL
QT/QTC: NORMAL
VENTRICULAR RATE: NORMAL

## 2022-12-19 PROCEDURE — 1101F PT FALLS ASSESS-DOCD LE1/YR: CPT | Mod: CPTII,,, | Performed by: STUDENT IN AN ORGANIZED HEALTH CARE EDUCATION/TRAINING PROGRAM

## 2022-12-19 PROCEDURE — 1126F AMNT PAIN NOTED NONE PRSNT: CPT | Mod: CPTII,,, | Performed by: STUDENT IN AN ORGANIZED HEALTH CARE EDUCATION/TRAINING PROGRAM

## 2022-12-19 PROCEDURE — G0439 PPPS, SUBSEQ VISIT: HCPCS | Mod: ,,, | Performed by: STUDENT IN AN ORGANIZED HEALTH CARE EDUCATION/TRAINING PROGRAM

## 2022-12-19 PROCEDURE — 1126F PR PAIN SEVERITY QUANTIFIED, NO PAIN PRESENT: ICD-10-PCS | Mod: CPTII,,, | Performed by: STUDENT IN AN ORGANIZED HEALTH CARE EDUCATION/TRAINING PROGRAM

## 2022-12-19 PROCEDURE — 3288F FALL RISK ASSESSMENT DOCD: CPT | Mod: CPTII,,, | Performed by: STUDENT IN AN ORGANIZED HEALTH CARE EDUCATION/TRAINING PROGRAM

## 2022-12-19 PROCEDURE — 1160F RVW MEDS BY RX/DR IN RCRD: CPT | Mod: CPTII,,, | Performed by: STUDENT IN AN ORGANIZED HEALTH CARE EDUCATION/TRAINING PROGRAM

## 2022-12-19 PROCEDURE — 3079F PR MOST RECENT DIASTOLIC BLOOD PRESSURE 80-89 MM HG: ICD-10-PCS | Mod: CPTII,,, | Performed by: STUDENT IN AN ORGANIZED HEALTH CARE EDUCATION/TRAINING PROGRAM

## 2022-12-19 PROCEDURE — 3288F PR FALLS RISK ASSESSMENT DOCUMENTED: ICD-10-PCS | Mod: CPTII,,, | Performed by: STUDENT IN AN ORGANIZED HEALTH CARE EDUCATION/TRAINING PROGRAM

## 2022-12-19 PROCEDURE — 1101F PR PT FALLS ASSESS DOC 0-1 FALLS W/OUT INJ PAST YR: ICD-10-PCS | Mod: CPTII,,, | Performed by: STUDENT IN AN ORGANIZED HEALTH CARE EDUCATION/TRAINING PROGRAM

## 2022-12-19 PROCEDURE — 93000 ELECTROCARDIOGRAM COMPLETE: CPT | Mod: ,,, | Performed by: STUDENT IN AN ORGANIZED HEALTH CARE EDUCATION/TRAINING PROGRAM

## 2022-12-19 PROCEDURE — 3066F PR DOCUMENTATION OF TREATMENT FOR NEPHROPATHY: ICD-10-PCS | Mod: CPTII,,, | Performed by: STUDENT IN AN ORGANIZED HEALTH CARE EDUCATION/TRAINING PROGRAM

## 2022-12-19 PROCEDURE — 4010F ACE/ARB THERAPY RXD/TAKEN: CPT | Mod: CPTII,,, | Performed by: STUDENT IN AN ORGANIZED HEALTH CARE EDUCATION/TRAINING PROGRAM

## 2022-12-19 PROCEDURE — G0439 PR MEDICARE ANNUAL WELLNESS SUBSEQUENT VISIT: ICD-10-PCS | Mod: ,,, | Performed by: STUDENT IN AN ORGANIZED HEALTH CARE EDUCATION/TRAINING PROGRAM

## 2022-12-19 PROCEDURE — 1160F PR REVIEW ALL MEDS BY PRESCRIBER/CLIN PHARMACIST DOCUMENTED: ICD-10-PCS | Mod: CPTII,,, | Performed by: STUDENT IN AN ORGANIZED HEALTH CARE EDUCATION/TRAINING PROGRAM

## 2022-12-19 PROCEDURE — 1159F MED LIST DOCD IN RCRD: CPT | Mod: CPTII,,, | Performed by: STUDENT IN AN ORGANIZED HEALTH CARE EDUCATION/TRAINING PROGRAM

## 2022-12-19 PROCEDURE — 4010F PR ACE/ARB THEARPY RXD/TAKEN: ICD-10-PCS | Mod: CPTII,,, | Performed by: STUDENT IN AN ORGANIZED HEALTH CARE EDUCATION/TRAINING PROGRAM

## 2022-12-19 PROCEDURE — 3079F DIAST BP 80-89 MM HG: CPT | Mod: CPTII,,, | Performed by: STUDENT IN AN ORGANIZED HEALTH CARE EDUCATION/TRAINING PROGRAM

## 2022-12-19 PROCEDURE — 3008F BODY MASS INDEX DOCD: CPT | Mod: CPTII,,, | Performed by: STUDENT IN AN ORGANIZED HEALTH CARE EDUCATION/TRAINING PROGRAM

## 2022-12-19 PROCEDURE — 3066F NEPHROPATHY DOC TX: CPT | Mod: CPTII,,, | Performed by: STUDENT IN AN ORGANIZED HEALTH CARE EDUCATION/TRAINING PROGRAM

## 2022-12-19 PROCEDURE — 3075F SYST BP GE 130 - 139MM HG: CPT | Mod: CPTII,,, | Performed by: STUDENT IN AN ORGANIZED HEALTH CARE EDUCATION/TRAINING PROGRAM

## 2022-12-19 PROCEDURE — 3075F PR MOST RECENT SYSTOLIC BLOOD PRESS GE 130-139MM HG: ICD-10-PCS | Mod: CPTII,,, | Performed by: STUDENT IN AN ORGANIZED HEALTH CARE EDUCATION/TRAINING PROGRAM

## 2022-12-19 PROCEDURE — 93000 POCT EKG 12-LEAD: ICD-10-PCS | Mod: ,,, | Performed by: STUDENT IN AN ORGANIZED HEALTH CARE EDUCATION/TRAINING PROGRAM

## 2022-12-19 PROCEDURE — 3008F PR BODY MASS INDEX (BMI) DOCUMENTED: ICD-10-PCS | Mod: CPTII,,, | Performed by: STUDENT IN AN ORGANIZED HEALTH CARE EDUCATION/TRAINING PROGRAM

## 2022-12-19 PROCEDURE — 1159F PR MEDICATION LIST DOCUMENTED IN MEDICAL RECORD: ICD-10-PCS | Mod: CPTII,,, | Performed by: STUDENT IN AN ORGANIZED HEALTH CARE EDUCATION/TRAINING PROGRAM

## 2022-12-19 NOTE — PROGRESS NOTES
Patient ID: [id number]     Chief Complaint: wellness; lab review; and slight swelling to lower ext , right ankle wound healing      HPI:     Jesi Cha is a 72 y.o. female here today for a Medicare Wellness. No acute complaints. Says she's doing well overall.     Opioid Screening: Patient medication list reviewed, patient is not taking prescription opioids. Patient is not using additional opioids than prescribed. Patient is at low risk of substance abuse based on this opioid use history.       ----------------------------  HTN (hypertension)  Hypokalemia  Obesity, unspecified  Thyroid cyst     Past Surgical History:   Procedure Laterality Date    APPENDECTOMY       SECTION      CHOLECYSTECTOMY         Review of patient's allergies indicates:   Allergen Reactions    Codeine        Outpatient Medications Marked as Taking for the 22 encounter (Office Visit) with Dk Rodriguez MD   Medication Sig Dispense Refill    aspirin 81 mg Cap Take 81 mg by mouth once daily at 6am.      calcitRIOL (ROCALTROL) 0.25 MCG Cap Take 1 capsule (0.25 mcg total) by mouth every other day. 45 capsule 0    diclofenac sodium (VOLTAREN) 1 % Gel APPLY 4 GRAMS TOPICALLY 4 TIMES DAILY AS NEEDED FOR PAIN APPLY A THIN FILM TO EACH KNEE AS NEEDED FOR ARTHRITIS PAIN. NOT TO EXCEED 16 GRAMS/DAY/SINGLE JOINT OF LOWER EXTREMITIES.      fluticasone propionate (FLONASE) 50 mcg/actuation nasal spray USE 1 TO 2 SPRAY(S) IN EACH NOSTRIL TWICE DAILY FOR ALLERGY SYMPTOMS      hydrALAZINE (APRESOLINE) 50 MG tablet Take 1 tablet (50 mg total) by mouth 3 (three) times daily. 90 tablet 2    losartan (COZAAR) 50 MG tablet Take 1 tablet (50 mg total) by mouth once daily. 90 tablet 0    metoprolol succinate (TOPROL-XL) 25 MG 24 hr tablet Take 25 mg by mouth 2 (two) times daily.      mupirocin (BACTROBAN) 2 % ointment Apply topically 2 (two) times daily. Apply thin film to affected area of leg 15 g 0    NIFEdipine (PROCARDIA-XL) 60 MG (OSM)  24 hr tablet Take 1 tablet (60 mg total) by mouth 2 (two) times a day. 180 tablet 0    nitroGLYCERIN (NITROSTAT) 0.4 MG SL tablet Place 0.4 mg under the tongue.      spironolactone (ALDACTONE) 25 MG tablet Take 1 tablet (25 mg total) by mouth once daily. 90 tablet 0       Social History     Socioeconomic History    Marital status:    Tobacco Use    Smoking status: Never    Smokeless tobacco: Never   Substance and Sexual Activity    Alcohol use: Never    Drug use: Never        Family History   Problem Relation Age of Onset    Arthritis Mother         Patient Care Team:  Dk Rodriguez MD as PCP - General (Internal Medicine)       Subjective:     Review of Systems   Constitutional:  Negative for chills and fever.   HENT:  Negative for sinus pressure/congestion and sore throat.    Respiratory:  Negative for cough, shortness of breath and wheezing.    Cardiovascular:  Negative for chest pain and leg swelling.   Gastrointestinal:  Negative for abdominal pain, nausea and vomiting.   Genitourinary:  Negative for dysuria and hematuria.   Musculoskeletal:  Negative for arthralgias and myalgias.   Integumentary:  Negative for rash.   Neurological:  Negative for dizziness and syncope.   Hematological:  Negative for adenopathy.   Psychiatric/Behavioral:  Negative for confusion. The patient is not nervous/anxious.          Patient Reported Health Risk Assessment  What [age] your [age]?: 70-79  Are you male or female?: Female  During the past four weeks, how much have you been bothered by emotional problems such as feeling anxious, depressed, irritable, sad, or downhearted and blue?: Not at all  During the past five weeks, has your physical and/or emotional health limited your social activities with family, friends, neighbors, or groups?: Not at all  During the past four weeks, how much bodily pain have you generally had?: Mild pain  During the past four weeks, was someone available to help if you needed and wanted help?:  Yes, quite a bit  During the past four weeks, what was the hardest physical activity you could do for at least two minutes?: Moderate  Can you get to places out of walking distance without help?  (For example, can you travel alone on buses or taxis, or drive your own car?): Yes  Can you go shopping for groceries or clothes without someone's help?: Yes  Can you prepare your own meals?: Yes  Can you do your own housework without help?: Yes  Because of any health problems, do you need the help of another person with your personal care needs such as eating, bathing, dressing, or getting around the house?: No  Can you handle your own money without help?: Yes  During the past four weeks, how would you rate your health in general?: Good  How have things been going for you during the past four weeks?: Pretty well  Are you having difficulties driving your car?: No  Do you always fasten your seat belt when you are in a car?: Yes, usually  How often in the past four weeks have you been bothered by falling or dizzy when standing up?: Never  How often in the past four weeks have you been bothered by sexual problems?: Never  How often in the past four weeks have you been bothered by trouble eating well?: Never  How often in the past four weeks have you been bothered by teeth or denture problems?: Never  How often in the past four weeks have you been bothered with problems using the telephone?: Never  How often in the past four weeks have you been bothered by tiredness or fatigue?: Never  Have you fallen two or more times in the past year?: No  Are you afraid of falling?: No  Are you a smoker?: No  During the past four weeks, how many drinks of wine, beer, or other alcoholic beverages did you have?: No alcohol at all  Do you exercise for about 20 minutes three or more days a week?: No, I usually do not exercise this much  Have you been given any information to help you with hazards in your house that might hurt you?: Yes  Have you  "been given any information to help you with keeping track of your medications?: Yes  How often do you have trouble taking medicines the way you've been told to take them?: I always take them as prescribed  How confident are you that you can control and manage most of your health problems?: Very confident  What is your race? (Check all that apply.):     Objective:     /82   Pulse 89   Temp 97.9 °F (36.6 °C) (Oral)   Resp 20   Ht 5' 3" (1.6 m)   Wt 91.5 kg (201 lb 12.8 oz)   SpO2 97%   BMI 35.75 kg/m²     Physical Exam  Constitutional:       General: She is not in acute distress.  HENT:      Head: Normocephalic.      Nose: No rhinorrhea.   Eyes:      Conjunctiva/sclera: Conjunctivae normal.      Pupils: Pupils are equal, round, and reactive to light.   Cardiovascular:      Rate and Rhythm: Normal rate. Rhythm irregular.      Heart sounds: Murmur (loud systolic) heard.   Pulmonary:      Effort: Pulmonary effort is normal.      Breath sounds: Normal breath sounds.   Abdominal:      Palpations: Abdomen is soft.      Tenderness: There is no abdominal tenderness.   Musculoskeletal:         General: No deformity or signs of injury.   Skin:     General: Skin is warm and dry.      Comments: Lateral lower R leg ulcer healing well, dry eschar, no erythema, tenderness, discharge   Neurological:      General: No focal deficit present.      Mental Status: She is alert. Mental status is at baseline.   Psychiatric:         Mood and Affect: Mood normal.         Behavior: Behavior normal.         No flowsheet data found.  Fall Risk Assessment - Outpatient 12/19/2022 12/14/2022 10/5/2022 9/19/2022 8/3/2022 8/1/2022 6/22/2022   Mobility Status Ambulatory Ambulatory Ambulatory w/ assistance Ambulatory w/ assistance Ambulatory w/ assistance Ambulatory Ambulatory   Number of falls 0 0 0 0 1 1 with injury 0   Identified as fall risk 0 0 0 1 1 1 0           Depression Screening  Over the past two weeks, has the " patient felt down, depressed, or hopeless?: No  Over the past two weeks, has the patient felt little interest or pleasure in doing things?: No  Functional Ability/Safety Screening  Was the patient's timed Up & Go test unsteady or longer than 30 seconds?: No  Does the patient need help with phone, transportation, shopping, preparing meals, housework, laundry, meds, or managing money?: No  Does the patient's home have rugs in the hallway, lack grab bars in the bathroom, lack handrails on the stairs or have poor lighting?: No  Have you noticed any hearing difficulties?: No  Cognitive Function (Assessed through direct observation with due consideration of information obtained by way of patient reports and/or concerns raised by family, friends, caretakers, or others)    Does the patient repeat questions/statements in the same day?: No  Does the patient have trouble remembering the date, year, and time?: No  Does the patient have difficulty managing finances?: No  Does the patient have a decreased sense of direction?: No  Assessment/Plan:     1. Medicare annual wellness visit, subsequent  Comments:  reviewed recent labs, see below for health maintenance    2. Sinus arrhythmia  Assessment & Plan:  Rhythm irregular on exam, EKG in clinic today shows sinus arrhythmia otherwise unremarkable      3. Risk for coronary artery disease greater than 10% in next 10 years  Comments:  ASCVD > 10%, see below for discussion of options with pt. Proceed with CT Calcium score  Orders:  -     CT Calcium Scoring Cardiac; Future; Expected date: 12/26/2022  -     POCT EKG 12-LEAD (Manually Resulted by Ordering Provider)    4. Screening for cardiovascular condition  -     CT Calcium Scoring Cardiac; Future; Expected date: 12/26/2022    5. Screening for osteoporosis  -     DXA Bone Density Spine And Hip; Future; Expected date: 12/19/2022    6. Menopausal and postmenopausal disorder  -     DXA Bone Density Spine And Hip; Future; Expected date:  12/19/2022    7. Screening mammogram for breast cancer  -     Mammo Digital Screening Bilat w/ Jorge; Future; Expected date: 12/19/2022           Medicare Annual Wellness and Personalized Prevention Plan:   Fall Risk + Home Safety + Hearing Impairment + Depression Screen + Opioid and Substance Abuse Screening + Cognitive Impairment Screen + Health Risk Assessment all reviewed.     Vaccinations:   - Flu: discussed, patient declines   - Pneumonia: discussed, pt will consider and let me know   - Shingles (>50): pt declines   - Tdap: pt says she had one recently at [facility]. Will request records.   - COVID: did not receive, declines  Screening:   - Pap Smear (21-65):   - Mammogram (40-50 discuss w/ pt, all >50): Ordered today   - Osteoporosis (all>65, sooner if risk factors): ordered today   - Lung Ca. Screening (50-80 if >20 pack yrs current or quit <15 yrs):   - Colon Ca. Screening (age >45): Cologuard negative 04/2022, consider repeat 04/2025  - Cardiac: discussed options for screening for CAD, not a treadmill candidate sec to leg pains, advanced CAD risk sec to CKD, HTN, age. Proceed with CT Calcium score. Discussed risks/benefits.    Advance Care Planning   I attest to discussing Advance Care Planning with patient and/or family member.  Education was provided including the importance of the Health Care Power of , Advance Directives, and/or [place] documentation.  The patient expressed understanding to the importance of this information and discussion.         Follow up in about 3 months (around 3/19/2023) for HTN. In addition to their scheduled follow up, the patient has also been instructed to follow up on as needed basis.

## 2022-12-23 PROBLEM — I49.8 SINUS ARRHYTHMIA: Status: ACTIVE | Noted: 2022-12-23

## 2023-03-06 ENCOUNTER — LAB VISIT (OUTPATIENT)
Dept: LAB | Facility: HOSPITAL | Age: 73
End: 2023-03-06
Payer: MEDICARE

## 2023-03-06 DIAGNOSIS — I10 HYPERTENSION, UNSPECIFIED TYPE: ICD-10-CM

## 2023-03-06 DIAGNOSIS — N18.30 STAGE 3 CHRONIC KIDNEY DISEASE, UNSPECIFIED WHETHER STAGE 3A OR 3B CKD: ICD-10-CM

## 2023-03-06 LAB
ALBUMIN SERPL-MCNC: 3.5 G/DL (ref 3.4–4.8)
ALBUMIN/GLOB SERPL: 1 RATIO (ref 1.1–2)
ALP SERPL-CCNC: 68 UNIT/L (ref 40–150)
ALT SERPL-CCNC: 19 UNIT/L (ref 0–55)
APPEARANCE UR: CLEAR
AST SERPL-CCNC: 23 UNIT/L (ref 5–34)
BACTERIA #/AREA URNS AUTO: NORMAL /HPF
BASOPHILS # BLD AUTO: 0.02 X10(3)/MCL (ref 0–0.2)
BASOPHILS NFR BLD AUTO: 0.4 %
BILIRUB UR QL STRIP.AUTO: NEGATIVE MG/DL
BILIRUBIN DIRECT+TOT PNL SERPL-MCNC: 0.6 MG/DL
BUN SERPL-MCNC: 49.4 MG/DL (ref 9.8–20.1)
CALCIUM SERPL-MCNC: 9.8 MG/DL (ref 8.4–10.2)
CHLORIDE SERPL-SCNC: 108 MMOL/L (ref 98–107)
CO2 SERPL-SCNC: 28 MMOL/L (ref 23–31)
COLOR UR AUTO: YELLOW
CREAT SERPL-MCNC: 2.44 MG/DL (ref 0.55–1.02)
CREAT UR-MCNC: 148.5 MG/DL (ref 47–110)
EOSINOPHIL # BLD AUTO: 0.18 X10(3)/MCL (ref 0–0.9)
EOSINOPHIL NFR BLD AUTO: 3.4 %
ERYTHROCYTE [DISTWIDTH] IN BLOOD BY AUTOMATED COUNT: 13.6 % (ref 11.5–17)
GFR SERPLBLD CREATININE-BSD FMLA CKD-EPI: 21 MLS/MIN/1.73/M2
GLOBULIN SER-MCNC: 3.5 GM/DL (ref 2.4–3.5)
GLUCOSE SERPL-MCNC: 115 MG/DL (ref 82–115)
GLUCOSE UR QL STRIP.AUTO: NEGATIVE MG/DL
HCT VFR BLD AUTO: 44.2 % (ref 37–47)
HGB BLD-MCNC: 14.3 G/DL (ref 12–16)
IMM GRANULOCYTES # BLD AUTO: 0.01 X10(3)/MCL (ref 0–0.04)
IMM GRANULOCYTES NFR BLD AUTO: 0.2 %
KETONES UR QL STRIP.AUTO: NEGATIVE MG/DL
LEUKOCYTE ESTERASE UR QL STRIP.AUTO: ABNORMAL UNIT/L
LYMPHOCYTES # BLD AUTO: 1.48 X10(3)/MCL (ref 0.6–4.6)
LYMPHOCYTES NFR BLD AUTO: 28 %
MCH RBC QN AUTO: 28.5 PG
MCHC RBC AUTO-ENTMCNC: 32.4 G/DL (ref 33–36)
MCV RBC AUTO: 88 FL (ref 80–94)
MONOCYTES # BLD AUTO: 0.42 X10(3)/MCL (ref 0.1–1.3)
MONOCYTES NFR BLD AUTO: 8 %
NEUTROPHILS # BLD AUTO: 3.17 X10(3)/MCL (ref 2.1–9.2)
NEUTROPHILS NFR BLD AUTO: 60 %
NITRITE UR QL STRIP.AUTO: NEGATIVE
PH UR STRIP.AUTO: 5.5 [PH]
PHOSPHATE SERPL-MCNC: 3.3 MG/DL (ref 2.3–4.7)
PLATELET # BLD AUTO: 187 X10(3)/MCL (ref 130–400)
PMV BLD AUTO: 10.7 FL (ref 7.4–10.4)
POTASSIUM SERPL-SCNC: 3.4 MMOL/L (ref 3.5–5.1)
PROT SERPL-MCNC: 7 GM/DL (ref 5.8–7.6)
PROT UR QL STRIP.AUTO: >=300 MG/DL
PROT UR STRIP-MCNC: 418.4 MG/DL
PTH-INTACT SERPL-MCNC: 218.4 PG/ML (ref 8.7–77)
RBC # BLD AUTO: 5.02 X10(6)/MCL (ref 4.2–5.4)
RBC #/AREA URNS AUTO: NORMAL /HPF
RBC UR QL AUTO: ABNORMAL UNIT/L
SODIUM SERPL-SCNC: 146 MMOL/L (ref 136–145)
SP GR UR STRIP.AUTO: >=1.03
SQUAMOUS #/AREA URNS AUTO: NORMAL /HPF
URINE PROTEIN/CREATININE RATIO (OHS): 2.8
UROBILINOGEN UR STRIP-ACNC: 0.2 MG/DL
WBC # SPEC AUTO: 5.3 X10(3)/MCL (ref 4.5–11.5)
WBC #/AREA URNS AUTO: NORMAL /HPF

## 2023-03-06 PROCEDURE — 85025 COMPLETE CBC W/AUTO DIFF WBC: CPT

## 2023-03-06 PROCEDURE — 83970 ASSAY OF PARATHORMONE: CPT

## 2023-03-06 PROCEDURE — 80053 COMPREHEN METABOLIC PANEL: CPT

## 2023-03-06 PROCEDURE — 82570 ASSAY OF URINE CREATININE: CPT

## 2023-03-06 PROCEDURE — 81001 URINALYSIS AUTO W/SCOPE: CPT

## 2023-03-06 PROCEDURE — 84100 ASSAY OF PHOSPHORUS: CPT

## 2023-03-06 PROCEDURE — 36415 COLL VENOUS BLD VENIPUNCTURE: CPT

## 2023-03-08 ENCOUNTER — DOCUMENTATION ONLY (OUTPATIENT)
Dept: PRIMARY CARE CLINIC | Facility: CLINIC | Age: 73
End: 2023-03-08
Payer: MEDICARE

## 2023-03-08 ENCOUNTER — OFFICE VISIT (OUTPATIENT)
Dept: PRIMARY CARE CLINIC | Facility: CLINIC | Age: 73
End: 2023-03-08
Payer: MEDICARE

## 2023-03-08 DIAGNOSIS — L97.312 CHRONIC ULCER OF RIGHT ANKLE WITH FAT LAYER EXPOSED: Primary | ICD-10-CM

## 2023-03-08 PROCEDURE — 99213 PR OFFICE/OUTPT VISIT, EST, LEVL III, 20-29 MIN: ICD-10-PCS | Mod: ,,, | Performed by: STUDENT IN AN ORGANIZED HEALTH CARE EDUCATION/TRAINING PROGRAM

## 2023-03-08 PROCEDURE — 99213 OFFICE O/P EST LOW 20 MIN: CPT | Mod: ,,, | Performed by: STUDENT IN AN ORGANIZED HEALTH CARE EDUCATION/TRAINING PROGRAM

## 2023-03-08 PROCEDURE — 1160F PR REVIEW ALL MEDS BY PRESCRIBER/CLIN PHARMACIST DOCUMENTED: ICD-10-PCS | Mod: CPTII,,, | Performed by: STUDENT IN AN ORGANIZED HEALTH CARE EDUCATION/TRAINING PROGRAM

## 2023-03-08 PROCEDURE — 1159F MED LIST DOCD IN RCRD: CPT | Mod: CPTII,,, | Performed by: STUDENT IN AN ORGANIZED HEALTH CARE EDUCATION/TRAINING PROGRAM

## 2023-03-08 PROCEDURE — 1159F PR MEDICATION LIST DOCUMENTED IN MEDICAL RECORD: ICD-10-PCS | Mod: CPTII,,, | Performed by: STUDENT IN AN ORGANIZED HEALTH CARE EDUCATION/TRAINING PROGRAM

## 2023-03-08 PROCEDURE — 1160F RVW MEDS BY RX/DR IN RCRD: CPT | Mod: CPTII,,, | Performed by: STUDENT IN AN ORGANIZED HEALTH CARE EDUCATION/TRAINING PROGRAM

## 2023-03-08 NOTE — PROGRESS NOTES
"Subjective:       Patient ID: Jesi Cha is a 72 y.o. female.    ----------------------------  HTN (hypertension)  Hypokalemia  Obesity, unspecified  Thyroid cyst     Chief Complaint: clinic visit  (Sore to right lower leg ," re-open " x 1 week)    Chronic lower R leg wound has reformed in same area of lateral lower R leg. Pt scraped it in her garage about 2 weeks ago. Seems to not be healing. No discharge, warmth, erythema, fevers/chills, etc.    Review of Systems   Constitutional:  Negative for chills and fever.   HENT:  Negative for sinus pressure/congestion and sore throat.    Respiratory:  Negative for cough, shortness of breath and wheezing.    Cardiovascular:  Negative for chest pain and leg swelling.   Gastrointestinal:  Negative for abdominal pain, nausea and vomiting.   Genitourinary:  Negative for dysuria and hematuria.   Musculoskeletal:  Negative for arthralgias and myalgias.   Integumentary:  Positive for wound. Negative for rash.   Neurological:  Negative for dizziness and syncope.   Hematological:  Negative for adenopathy.   Psychiatric/Behavioral:  Negative for confusion. The patient is not nervous/anxious.          Objective:      Physical Exam  Constitutional:       General: She is not in acute distress.  HENT:      Head: Normocephalic.      Nose: No rhinorrhea.   Eyes:      Conjunctiva/sclera: Conjunctivae normal.      Pupils: Pupils are equal, round, and reactive to light.   Cardiovascular:      Rate and Rhythm: Normal rate. Rhythm irregular.      Heart sounds: Murmur (loud systolic) heard.   Pulmonary:      Effort: Pulmonary effort is normal.      Breath sounds: Normal breath sounds.   Abdominal:      Palpations: Abdomen is soft.      Tenderness: There is no abdominal tenderness.   Musculoskeletal:         General: No deformity or signs of injury.   Skin:     General: Skin is warm and dry.      Comments: Approx 4 cm diameter moist eschar on lateral aspect of R lower leg, no discharge, " warmth, or erythema   Neurological:      General: No focal deficit present.      Mental Status: She is alert. Mental status is at baseline.   Psychiatric:         Mood and Affect: Mood normal.         Behavior: Behavior normal.         Assessment & Plan:   1. Chronic ulcer of right ankle with fat layer exposed  Assessment & Plan:  Unfortunately wound has returned. Last time took several months of wound care visits and home health wound care to heal. Multiple courses of antibiotics did not help. MRI last time unrevealing. Will refer back to wound care and consult home health again for wound care.    Orders:  -     Ambulatory referral/consult to Wound Clinic  -     Ambulatory referral/consult to Home Health          No follow-ups on file. In addition to their scheduled follow up, the patient has also been instructed to follow up on as needed basis.

## 2023-03-16 NOTE — ASSESSMENT & PLAN NOTE
Unfortunately wound has returned. Last time took several months of wound care visits and home health wound care to heal. Multiple courses of antibiotics did not help. MRI last time unrevealing. Will refer back to wound care and consult home health again for wound care.

## 2023-03-27 ENCOUNTER — TELEPHONE (OUTPATIENT)
Dept: PRIMARY CARE CLINIC | Facility: CLINIC | Age: 73
End: 2023-03-27
Payer: MEDICARE

## 2023-03-27 NOTE — TELEPHONE ENCOUNTER
"----- Message from Dk Rodriguez MD sent at 3/27/2023  7:59 AM CDT -----  Please tell Mrs. Cha that her calcium score was 14. This is considered a low score. It means she has some plaques in the vessels that supply her heart, but they are mild/small. No further workup or management is needed at this time.    Can we find out if she's taking Lipitor (atorvastatin)? I see if marked "discontinued" on 10/5/22 at a Nephrology visit, but I can't see any indication in the note why it would be discontinued. So, I'm not sure if that was on purpose or a mistake. Please see if she's on this medicine.    Thanks  Dk Rodriguez MD    "

## 2023-03-27 NOTE — TELEPHONE ENCOUNTER
----- Message from Dk Rodriguez MD sent at 3/27/2023  8:02 AM CDT -----  Please tell Mrs. Vivar that her DEXA scan showed osteopenia. This means her bones are slightly weakened but not as severe as osteoporosis. We encouraged weight bearing exercise (like walking). Her calcium and Vitamin D levels are good. No further management is needed at this time.     Dk Rodriguez MD

## 2023-03-31 ENCOUNTER — OFFICE VISIT (OUTPATIENT)
Dept: NEPHROLOGY | Facility: CLINIC | Age: 73
End: 2023-03-31
Payer: MEDICARE

## 2023-03-31 VITALS
TEMPERATURE: 98 F | SYSTOLIC BLOOD PRESSURE: 140 MMHG | BODY MASS INDEX: 35.08 KG/M2 | RESPIRATION RATE: 20 BRPM | HEIGHT: 63 IN | WEIGHT: 198 LBS | DIASTOLIC BLOOD PRESSURE: 82 MMHG | HEART RATE: 90 BPM | OXYGEN SATURATION: 98 %

## 2023-03-31 DIAGNOSIS — R80.1 PERSISTENT PROTEINURIA: ICD-10-CM

## 2023-03-31 DIAGNOSIS — I10 PRIMARY HYPERTENSION: ICD-10-CM

## 2023-03-31 DIAGNOSIS — E87.6 HYPOKALEMIA: ICD-10-CM

## 2023-03-31 DIAGNOSIS — N25.81 SECONDARY HYPERPARATHYROIDISM OF RENAL ORIGIN: ICD-10-CM

## 2023-03-31 DIAGNOSIS — N18.32 STAGE 3B CHRONIC KIDNEY DISEASE: Primary | ICD-10-CM

## 2023-03-31 PROCEDURE — 1101F PT FALLS ASSESS-DOCD LE1/YR: CPT | Mod: CPTII,S$GLB,,

## 2023-03-31 PROCEDURE — 3008F PR BODY MASS INDEX (BMI) DOCUMENTED: ICD-10-PCS | Mod: CPTII,S$GLB,,

## 2023-03-31 PROCEDURE — 1101F PR PT FALLS ASSESS DOC 0-1 FALLS W/OUT INJ PAST YR: ICD-10-PCS | Mod: CPTII,S$GLB,,

## 2023-03-31 PROCEDURE — 3077F PR MOST RECENT SYSTOLIC BLOOD PRESSURE >= 140 MM HG: ICD-10-PCS | Mod: CPTII,S$GLB,,

## 2023-03-31 PROCEDURE — 3066F PR DOCUMENTATION OF TREATMENT FOR NEPHROPATHY: ICD-10-PCS | Mod: CPTII,S$GLB,,

## 2023-03-31 PROCEDURE — 1159F MED LIST DOCD IN RCRD: CPT | Mod: CPTII,S$GLB,,

## 2023-03-31 PROCEDURE — 3066F NEPHROPATHY DOC TX: CPT | Mod: CPTII,S$GLB,,

## 2023-03-31 PROCEDURE — 3079F PR MOST RECENT DIASTOLIC BLOOD PRESSURE 80-89 MM HG: ICD-10-PCS | Mod: CPTII,S$GLB,,

## 2023-03-31 PROCEDURE — 3077F SYST BP >= 140 MM HG: CPT | Mod: CPTII,S$GLB,,

## 2023-03-31 PROCEDURE — 99999 PR PBB SHADOW E&M-EST. PATIENT-LVL IV: CPT | Mod: PBBFAC,,,

## 2023-03-31 PROCEDURE — 3288F FALL RISK ASSESSMENT DOCD: CPT | Mod: CPTII,S$GLB,,

## 2023-03-31 PROCEDURE — 3288F PR FALLS RISK ASSESSMENT DOCUMENTED: ICD-10-PCS | Mod: CPTII,S$GLB,,

## 2023-03-31 PROCEDURE — 99999 PR PBB SHADOW E&M-EST. PATIENT-LVL IV: ICD-10-PCS | Mod: PBBFAC,,,

## 2023-03-31 PROCEDURE — 1126F PR PAIN SEVERITY QUANTIFIED, NO PAIN PRESENT: ICD-10-PCS | Mod: CPTII,S$GLB,,

## 2023-03-31 PROCEDURE — 1159F PR MEDICATION LIST DOCUMENTED IN MEDICAL RECORD: ICD-10-PCS | Mod: CPTII,S$GLB,,

## 2023-03-31 PROCEDURE — 3079F DIAST BP 80-89 MM HG: CPT | Mod: CPTII,S$GLB,,

## 2023-03-31 PROCEDURE — 99214 OFFICE O/P EST MOD 30 MIN: CPT | Mod: S$GLB,,,

## 2023-03-31 PROCEDURE — 3008F BODY MASS INDEX DOCD: CPT | Mod: CPTII,S$GLB,,

## 2023-03-31 PROCEDURE — 99214 PR OFFICE/OUTPT VISIT, EST, LEVL IV, 30-39 MIN: ICD-10-PCS | Mod: S$GLB,,,

## 2023-03-31 PROCEDURE — 1126F AMNT PAIN NOTED NONE PRSNT: CPT | Mod: CPTII,S$GLB,,

## 2023-03-31 NOTE — PROGRESS NOTES
[facility] Ambulatory Progress Note      HPI  Jesi Cha, 72 y.o. female, presents to office for a follow up visit for CKD 3B related to nephrosclerosis.  Overall patient feels good.  She has no acute complaints.  She has been exercising in trying to follow a healthy diet.  She is in a more positive mindset.    Patient denies taking NSAIDs or new antibiotics. Also denies recent episode of dehydration, diarrhea, vomiting, acute illness, hospitalization, recent angiograms or exposure to IV radiocontrast.    Medical Diagnoses:   Past Medical History:   Diagnosis Date    HTN (hypertension)     Hypokalemia     Obesity, unspecified     Thyroid cyst      Patient Active Problem List   Diagnosis    Class 2 severe obesity due to excess calories with serious comorbidity and body mass index (BMI) of 37.0 to 37.9 in adult    COVID-19    Cyst of thyroid    Hypokalemia    Primary hypertension    Stage 3 chronic kidney disease    Cellulitis of right lower extremity    Mitral regurgitation    Mild aortic regurgitation    Chronic ulcer of right ankle with fat layer exposed    Impaired mobility    Chronic kidney disease (CKD), stage IV (severe)    Sinus arrhythmia       Surgical History:   Past Surgical History:   Procedure Laterality Date    APPENDECTOMY       SECTION      CHOLECYSTECTOMY         Family History:   Family History   Problem Relation Age of Onset    Arthritis Mother        Social History:   Social History     Tobacco Use    Smoking status: Never     Passive exposure: Never    Smokeless tobacco: Never   Substance Use Topics    Alcohol use: Never       Allergies:  Review of patient's allergies indicates:   Allergen Reactions    Codeine        Medications:    Current Outpatient Medications:     aspirin 81 mg Cap, Take 81 mg by mouth once daily at 6am., Disp: , Rfl:     diclofenac sodium (VOLTAREN) 1 % Gel, APPLY 4 GRAMS TOPICALLY 4 TIMES DAILY AS NEEDED FOR PAIN APPLY A THIN FILM TO EACH KNEE AS NEEDED  "FOR ARTHRITIS PAIN. NOT TO EXCEED 16 GRAMS/DAY/SINGLE JOINT OF LOWER EXTREMITIES., Disp: , Rfl:     fluticasone propionate (FLONASE) 50 mcg/actuation nasal spray, USE 1 TO 2 SPRAY(S) IN EACH NOSTRIL TWICE DAILY FOR ALLERGY SYMPTOMS, Disp: , Rfl:     hydrALAZINE (APRESOLINE) 50 MG tablet, Take 1 tablet (50 mg total) by mouth 3 (three) times daily., Disp: 90 tablet, Rfl: 2    losartan (COZAAR) 50 MG tablet, Take 1 tablet (50 mg total) by mouth once daily., Disp: 90 tablet, Rfl: 0    metoprolol succinate (TOPROL-XL) 25 MG 24 hr tablet, Take 25 mg by mouth 2 (two) times daily., Disp: , Rfl:     mupirocin (BACTROBAN) 2 % ointment, Apply topically 2 (two) times daily. Apply thin film to affected area of leg, Disp: 15 g, Rfl: 0    NIFEdipine (PROCARDIA-XL) 60 MG (OSM) 24 hr tablet, Take 1 tablet (60 mg total) by mouth 2 (two) times a day., Disp: 180 tablet, Rfl: 0    nitroGLYCERIN (NITROSTAT) 0.4 MG SL tablet, Place 0.4 mg under the tongue., Disp: , Rfl:     spironolactone (ALDACTONE) 25 MG tablet, Take 1 tablet (25 mg total) by mouth once daily., Disp: 90 tablet, Rfl: 0       Review of Systems:    Constitutional: Denies fever, fatigue, generalized weakness  Skin: Denies rashes, no itching, no new skin lesions; 2 wounds to right lower extremity.  One has healed in 1 is close to being completely healed  Respiratory:  Denies cough, shortness of breath, or wheezing  Cardiovascular: Denies chest pain, palpitations, or swelling  Gastrointestional: Denies abdominal pain, nausea, vomiting, diarrhea, or constipation  Genitourinary: Denies dysuria, hematuria, foamy urine, or incontinence; reports able to empty bladder  Musculoskeletal: Denies back or flank pain  Neurological: Denies headaches, dizziness, paresthesias, tremors or focal weakness      Vital Signs:  BP (!) 140/82 (BP Location: Left arm, Patient Position: Sitting)   Pulse 90   Temp 97.7 °F (36.5 °C) (Temporal)   Resp 20   Ht 5' 3" (1.6 m)   Wt 89.8 kg (197 lb 15.6 " oz)   SpO2 98%   BMI 35.07 kg/m²   Body mass index is 35.07 kg/m².      Physical Exam:    General: no acute distress, awake, alert  Eyes: PERRLA, conjunctiva clear, eyelids without swelling  HENT: atraumatic, oropharynx and nasal mucosa patent  Neck: supple, trache midline, full ROM, no JVD, no thyromegaly or lymphadenopathy  Respiratory: equal, unlabored, clear to auscultation A/P  Cardiovascular: RRR without rub; radial and pedal pulses +2 BL  Edema:  +1 bilateral lower extremity edema  Gastrointestinal: soft, non-tender, non-distended; positive bowel sounds; no masses to palpation; no ascites  Genitourinary: no CVA tenderness upon palpation  Musculoskeletal: ROM without new limitation or discomfort  Integumentary: warm, dry; healing wound to right foot.  Currently wrapped  Neurological: oriented x4, appropriate, no acute deficits; no asterixis      Labs:        Component Value Date/Time     (H) 03/06/2023 1023     12/17/2022 1115    K 3.4 (L) 03/06/2023 1023    K 4.0 12/17/2022 1115    CO2 28 03/06/2023 1023    CO2 24 12/17/2022 1115    BUN 49.4 (H) 03/06/2023 1023    BUN 41.7 (H) 12/17/2022 1115    CREATININE 2.44 (H) 03/06/2023 1023    CREATININE 2.55 (H) 12/17/2022 1115    CREATININE 2.39 (H) 12/08/2022 1213    CREATININE 2.74 (H) 10/03/2022 1324    CALCIUM 9.8 03/06/2023 1023    CALCIUM 10.0 12/17/2022 1115    PHOS 3.3 03/06/2023 1023    PHOS 3.8 12/08/2022 1213    .4 (H) 03/06/2023 1023    .9 (H) 12/08/2022 1213    .0 (H) 10/03/2022 1324           Component Value Date/Time    WBC 5.3 03/06/2023 1023    WBC 5.2 12/17/2022 1115    HGB 14.3 03/06/2023 1023    HGB 13.4 12/17/2022 1115    HCT 44.2 03/06/2023 1023    HCT 42.8 12/17/2022 1115     03/06/2023 1023     12/17/2022 1115       Urine Creatinine   Date Value Ref Range Status   03/06/2023 148.5 (H) 47.0 - 110.0 mg/dL Final   12/08/2022 81.0 47.0 - 110.0 mg/dL Final       Urine Protein Level   Date Value Ref  Range Status   03/06/2023 418.4 mg/dL Final   12/08/2022 22.3 mg/dL Final         Impression:    1. Stage 3b chronic kidney disease  Related to nephrosclerosis and uncontrolled hypertension.  Creatinine within her baseline and improved since our last visit in December.    Serum immuno electrophoresis negative    Main line of management is tight control of hypertension and refraining from nephrotoxic drugs      2. Primary hypertension  Maintained on hydralazine, losartan, metoprolol, nifedipine, spironolactone; blood pressure stable.  She does not check it regularly at home however she does have home health that comes twice a week and she reports that her blood pressure is 1 20s to 140s systolic at home.  Advised low-sodium diet especially with the edema to her lower extremities.     3. Secondary hyperparathyroidism of renal origin:  Patient reports she is taking calcitriol 0.25 mcg every other day.  We will monitor trend.  If next visit PTH increases we will increase calcitriol to daily.    4. Mild hypokalemia noted on labs completed at the beginning of this month.  Recommend increase potassium in diet (oranges, bananas, tomatoes)    5. Proteinuria increased to 2.8 g/24 hours.  This is a significant increase from her baseline, however not unexpected with her uncontrolled HTN and advanced CKD; serologic TELLES for secondary causes of GN this past year has been negative (MANUEL, complements, hepatitis, SIEP).  Patient also reports that she is taking Leandro powder daily to assist with wound healing.  Leandro does delay the breakdown of protein and is a possible contributing factor to her increase in proteinuria.  Advised low protein diet.       Plan:      Follow up in aprx 2 months with labs to monitor proteinuria closer    Patient to call our office with any concerns prior to next appointment.    Avoid NSAIDs (Aleve, Mobic, Celebrex, Ibuprofen, Advil, Toradol and Diclofenac).  Only take tylenol occasionally if needed for  aches/pains.    Educated on low sodium diet:  Less than 1500 mg daily  Avoid high salt foods (olives, pickles, smoked meats, deli meats, salted potato chips, etc.).   Do not add salt to your food at the table.   Use only small amounts of salt when cooking.    Recommended Daily Protein Intake for chronic kidney disease:  0.8 g/kg      Peter Solomon, MYRTLE

## 2023-04-05 ENCOUNTER — EXTERNAL HOME HEALTH (OUTPATIENT)
Dept: HOME HEALTH SERVICES | Facility: HOSPITAL | Age: 73
End: 2023-04-05
Payer: MEDICARE

## 2023-05-19 ENCOUNTER — DOCUMENT SCAN (OUTPATIENT)
Dept: HOME HEALTH SERVICES | Facility: HOSPITAL | Age: 73
End: 2023-05-19
Payer: MEDICARE

## 2023-06-12 ENCOUNTER — LAB VISIT (OUTPATIENT)
Dept: LAB | Facility: HOSPITAL | Age: 73
End: 2023-06-12
Payer: MEDICARE

## 2023-06-12 DIAGNOSIS — E87.6 HYPOKALEMIA: ICD-10-CM

## 2023-06-12 DIAGNOSIS — I10 PRIMARY HYPERTENSION: ICD-10-CM

## 2023-06-12 DIAGNOSIS — N25.81 SECONDARY HYPERPARATHYROIDISM OF RENAL ORIGIN: ICD-10-CM

## 2023-06-12 DIAGNOSIS — N18.32 STAGE 3B CHRONIC KIDNEY DISEASE: ICD-10-CM

## 2023-06-12 DIAGNOSIS — R80.1 PERSISTENT PROTEINURIA: ICD-10-CM

## 2023-06-12 LAB
ALBUMIN SERPL-MCNC: 3.5 G/DL (ref 3.4–4.8)
ALBUMIN/GLOB SERPL: 1 RATIO (ref 1.1–2)
ALP SERPL-CCNC: 74 UNIT/L (ref 40–150)
ALT SERPL-CCNC: 16 UNIT/L (ref 0–55)
APPEARANCE UR: CLEAR
AST SERPL-CCNC: 19 UNIT/L (ref 5–34)
BACTERIA #/AREA URNS AUTO: NORMAL /HPF
BASOPHILS # BLD AUTO: 0.02 X10(3)/MCL
BASOPHILS NFR BLD AUTO: 0.4 %
BILIRUB UR QL STRIP.AUTO: NEGATIVE MG/DL
BILIRUBIN DIRECT+TOT PNL SERPL-MCNC: 0.6 MG/DL
BUN SERPL-MCNC: 41.1 MG/DL (ref 9.8–20.1)
CALCIUM SERPL-MCNC: 9.2 MG/DL (ref 8.4–10.2)
CHLORIDE SERPL-SCNC: 110 MMOL/L (ref 98–107)
CO2 SERPL-SCNC: 25 MMOL/L (ref 23–31)
COLOR UR: YELLOW
CREAT SERPL-MCNC: 2.72 MG/DL (ref 0.55–1.02)
CREAT UR-MCNC: 116.8 MG/DL (ref 47–110)
EOSINOPHIL # BLD AUTO: 0.17 X10(3)/MCL (ref 0–0.9)
EOSINOPHIL NFR BLD AUTO: 3.2 %
ERYTHROCYTE [DISTWIDTH] IN BLOOD BY AUTOMATED COUNT: 14.3 % (ref 11.5–17)
GFR SERPLBLD CREATININE-BSD FMLA CKD-EPI: 18 MLS/MIN/1.73/M2
GLOBULIN SER-MCNC: 3.4 GM/DL (ref 2.4–3.5)
GLUCOSE SERPL-MCNC: 108 MG/DL (ref 82–115)
GLUCOSE UR QL STRIP.AUTO: NEGATIVE MG/DL
HCT VFR BLD AUTO: 42.7 % (ref 37–47)
HGB BLD-MCNC: 13.8 G/DL (ref 12–16)
IMM GRANULOCYTES # BLD AUTO: 0.01 X10(3)/MCL (ref 0–0.04)
IMM GRANULOCYTES NFR BLD AUTO: 0.2 %
KETONES UR QL STRIP.AUTO: NEGATIVE MG/DL
LEUKOCYTE ESTERASE UR QL STRIP.AUTO: NEGATIVE UNIT/L
LYMPHOCYTES # BLD AUTO: 1.94 X10(3)/MCL (ref 0.6–4.6)
LYMPHOCYTES NFR BLD AUTO: 36.5 %
MCH RBC QN AUTO: 29.1 PG (ref 27–31)
MCHC RBC AUTO-ENTMCNC: 32.3 G/DL (ref 33–36)
MCV RBC AUTO: 89.9 FL (ref 80–94)
MONOCYTES # BLD AUTO: 0.47 X10(3)/MCL (ref 0.1–1.3)
MONOCYTES NFR BLD AUTO: 8.9 %
NEUTROPHILS # BLD AUTO: 2.7 X10(3)/MCL (ref 2.1–9.2)
NEUTROPHILS NFR BLD AUTO: 50.8 %
NITRITE UR QL STRIP.AUTO: NEGATIVE
PH UR STRIP.AUTO: 5.5 [PH]
PHOSPHATE SERPL-MCNC: 3.5 MG/DL (ref 2.3–4.7)
PLATELET # BLD AUTO: 172 X10(3)/MCL (ref 130–400)
PMV BLD AUTO: 10.3 FL (ref 7.4–10.4)
POTASSIUM SERPL-SCNC: 3.4 MMOL/L (ref 3.5–5.1)
PROT SERPL-MCNC: 6.9 GM/DL (ref 5.8–7.6)
PROT UR QL STRIP.AUTO: >=300 MG/DL
PROT UR STRIP-MCNC: 359.2 MG/DL
PTH-INTACT SERPL-MCNC: 358.7 PG/ML (ref 8.7–77)
RBC # BLD AUTO: 4.75 X10(6)/MCL (ref 4.2–5.4)
RBC #/AREA URNS AUTO: NORMAL /HPF
RBC UR QL AUTO: ABNORMAL UNIT/L
SODIUM SERPL-SCNC: 146 MMOL/L (ref 136–145)
SP GR UR STRIP.AUTO: 1.02
SQUAMOUS #/AREA URNS AUTO: NORMAL /HPF
URINE PROTEIN/CREATININE RATIO (OHS): 3.1
UROBILINOGEN UR STRIP-ACNC: 0.2 MG/DL
WBC # SPEC AUTO: 5.31 X10(3)/MCL (ref 4.5–11.5)
WBC #/AREA URNS AUTO: NORMAL /HPF

## 2023-06-12 PROCEDURE — 82570 ASSAY OF URINE CREATININE: CPT

## 2023-06-12 PROCEDURE — 80053 COMPREHEN METABOLIC PANEL: CPT

## 2023-06-12 PROCEDURE — 85025 COMPLETE CBC W/AUTO DIFF WBC: CPT

## 2023-06-12 PROCEDURE — 81001 URINALYSIS AUTO W/SCOPE: CPT

## 2023-06-12 PROCEDURE — 36415 COLL VENOUS BLD VENIPUNCTURE: CPT

## 2023-06-12 PROCEDURE — 83970 ASSAY OF PARATHORMONE: CPT

## 2023-06-12 PROCEDURE — 84100 ASSAY OF PHOSPHORUS: CPT

## 2023-06-14 ENCOUNTER — OFFICE VISIT (OUTPATIENT)
Dept: PRIMARY CARE CLINIC | Facility: CLINIC | Age: 73
End: 2023-06-14
Payer: MEDICARE

## 2023-06-14 VITALS
HEIGHT: 63 IN | HEART RATE: 79 BPM | DIASTOLIC BLOOD PRESSURE: 100 MMHG | WEIGHT: 200 LBS | OXYGEN SATURATION: 97 % | BODY MASS INDEX: 35.44 KG/M2 | TEMPERATURE: 99 F | SYSTOLIC BLOOD PRESSURE: 180 MMHG | RESPIRATION RATE: 20 BRPM

## 2023-06-14 DIAGNOSIS — I10 PRIMARY HYPERTENSION: Primary | ICD-10-CM

## 2023-06-14 DIAGNOSIS — L97.312 CHRONIC ULCER OF RIGHT ANKLE WITH FAT LAYER EXPOSED: ICD-10-CM

## 2023-06-14 DIAGNOSIS — E66.01 CLASS 2 SEVERE OBESITY DUE TO EXCESS CALORIES WITH SERIOUS COMORBIDITY AND BODY MASS INDEX (BMI) OF 35.0 TO 35.9 IN ADULT: ICD-10-CM

## 2023-06-14 DIAGNOSIS — N18.4 CHRONIC KIDNEY DISEASE (CKD), STAGE IV (SEVERE): ICD-10-CM

## 2023-06-14 PROBLEM — L03.115 CELLULITIS OF RIGHT LOWER EXTREMITY: Status: RESOLVED | Noted: 2022-06-14 | Resolved: 2023-06-14

## 2023-06-14 PROBLEM — Z74.09 IMPAIRED MOBILITY: Status: RESOLVED | Noted: 2022-08-01 | Resolved: 2023-06-14

## 2023-06-14 PROBLEM — N18.30 STAGE 3 CHRONIC KIDNEY DISEASE: Status: RESOLVED | Noted: 2022-06-13 | Resolved: 2023-06-14

## 2023-06-14 PROBLEM — U07.1 COVID-19: Status: RESOLVED | Noted: 2021-10-03 | Resolved: 2023-06-14

## 2023-06-14 PROCEDURE — 99214 PR OFFICE/OUTPT VISIT, EST, LEVL IV, 30-39 MIN: ICD-10-PCS | Mod: ,,, | Performed by: STUDENT IN AN ORGANIZED HEALTH CARE EDUCATION/TRAINING PROGRAM

## 2023-06-14 PROCEDURE — 1159F MED LIST DOCD IN RCRD: CPT | Mod: CPTII,,, | Performed by: STUDENT IN AN ORGANIZED HEALTH CARE EDUCATION/TRAINING PROGRAM

## 2023-06-14 PROCEDURE — 4010F ACE/ARB THERAPY RXD/TAKEN: CPT | Mod: CPTII,,, | Performed by: STUDENT IN AN ORGANIZED HEALTH CARE EDUCATION/TRAINING PROGRAM

## 2023-06-14 PROCEDURE — 1101F PR PT FALLS ASSESS DOC 0-1 FALLS W/OUT INJ PAST YR: ICD-10-PCS | Mod: CPTII,,, | Performed by: STUDENT IN AN ORGANIZED HEALTH CARE EDUCATION/TRAINING PROGRAM

## 2023-06-14 PROCEDURE — 1159F PR MEDICATION LIST DOCUMENTED IN MEDICAL RECORD: ICD-10-PCS | Mod: CPTII,,, | Performed by: STUDENT IN AN ORGANIZED HEALTH CARE EDUCATION/TRAINING PROGRAM

## 2023-06-14 PROCEDURE — 3066F NEPHROPATHY DOC TX: CPT | Mod: CPTII,,, | Performed by: STUDENT IN AN ORGANIZED HEALTH CARE EDUCATION/TRAINING PROGRAM

## 2023-06-14 PROCEDURE — 3080F PR MOST RECENT DIASTOLIC BLOOD PRESSURE >= 90 MM HG: ICD-10-PCS | Mod: CPTII,,, | Performed by: STUDENT IN AN ORGANIZED HEALTH CARE EDUCATION/TRAINING PROGRAM

## 2023-06-14 PROCEDURE — 1126F AMNT PAIN NOTED NONE PRSNT: CPT | Mod: CPTII,,, | Performed by: STUDENT IN AN ORGANIZED HEALTH CARE EDUCATION/TRAINING PROGRAM

## 2023-06-14 PROCEDURE — 3008F BODY MASS INDEX DOCD: CPT | Mod: CPTII,,, | Performed by: STUDENT IN AN ORGANIZED HEALTH CARE EDUCATION/TRAINING PROGRAM

## 2023-06-14 PROCEDURE — 3288F PR FALLS RISK ASSESSMENT DOCUMENTED: ICD-10-PCS | Mod: CPTII,,, | Performed by: STUDENT IN AN ORGANIZED HEALTH CARE EDUCATION/TRAINING PROGRAM

## 2023-06-14 PROCEDURE — 1160F PR REVIEW ALL MEDS BY PRESCRIBER/CLIN PHARMACIST DOCUMENTED: ICD-10-PCS | Mod: CPTII,,, | Performed by: STUDENT IN AN ORGANIZED HEALTH CARE EDUCATION/TRAINING PROGRAM

## 2023-06-14 PROCEDURE — 1101F PT FALLS ASSESS-DOCD LE1/YR: CPT | Mod: CPTII,,, | Performed by: STUDENT IN AN ORGANIZED HEALTH CARE EDUCATION/TRAINING PROGRAM

## 2023-06-14 PROCEDURE — 1126F PR PAIN SEVERITY QUANTIFIED, NO PAIN PRESENT: ICD-10-PCS | Mod: CPTII,,, | Performed by: STUDENT IN AN ORGANIZED HEALTH CARE EDUCATION/TRAINING PROGRAM

## 2023-06-14 PROCEDURE — 3066F PR DOCUMENTATION OF TREATMENT FOR NEPHROPATHY: ICD-10-PCS | Mod: CPTII,,, | Performed by: STUDENT IN AN ORGANIZED HEALTH CARE EDUCATION/TRAINING PROGRAM

## 2023-06-14 PROCEDURE — 1160F RVW MEDS BY RX/DR IN RCRD: CPT | Mod: CPTII,,, | Performed by: STUDENT IN AN ORGANIZED HEALTH CARE EDUCATION/TRAINING PROGRAM

## 2023-06-14 PROCEDURE — 3008F PR BODY MASS INDEX (BMI) DOCUMENTED: ICD-10-PCS | Mod: CPTII,,, | Performed by: STUDENT IN AN ORGANIZED HEALTH CARE EDUCATION/TRAINING PROGRAM

## 2023-06-14 PROCEDURE — 99214 OFFICE O/P EST MOD 30 MIN: CPT | Mod: ,,, | Performed by: STUDENT IN AN ORGANIZED HEALTH CARE EDUCATION/TRAINING PROGRAM

## 2023-06-14 PROCEDURE — 3077F SYST BP >= 140 MM HG: CPT | Mod: CPTII,,, | Performed by: STUDENT IN AN ORGANIZED HEALTH CARE EDUCATION/TRAINING PROGRAM

## 2023-06-14 PROCEDURE — 4010F PR ACE/ARB THEARPY RXD/TAKEN: ICD-10-PCS | Mod: CPTII,,, | Performed by: STUDENT IN AN ORGANIZED HEALTH CARE EDUCATION/TRAINING PROGRAM

## 2023-06-14 PROCEDURE — 3080F DIAST BP >= 90 MM HG: CPT | Mod: CPTII,,, | Performed by: STUDENT IN AN ORGANIZED HEALTH CARE EDUCATION/TRAINING PROGRAM

## 2023-06-14 PROCEDURE — 3077F PR MOST RECENT SYSTOLIC BLOOD PRESSURE >= 140 MM HG: ICD-10-PCS | Mod: CPTII,,, | Performed by: STUDENT IN AN ORGANIZED HEALTH CARE EDUCATION/TRAINING PROGRAM

## 2023-06-14 PROCEDURE — 3288F FALL RISK ASSESSMENT DOCD: CPT | Mod: CPTII,,, | Performed by: STUDENT IN AN ORGANIZED HEALTH CARE EDUCATION/TRAINING PROGRAM

## 2023-06-14 RX ORDER — LOSARTAN POTASSIUM 50 MG/1
50 TABLET ORAL DAILY
Qty: 90 TABLET | Refills: 3 | Status: SHIPPED | OUTPATIENT
Start: 2023-06-14 | End: 2024-06-08

## 2023-06-14 RX ORDER — NIFEDIPINE 60 MG/1
60 TABLET, EXTENDED RELEASE ORAL 2 TIMES DAILY
Qty: 180 TABLET | Refills: 3 | Status: SHIPPED | OUTPATIENT
Start: 2023-06-14 | End: 2024-06-08

## 2023-06-14 NOTE — PROGRESS NOTES
"Subjective:       Patient ID: Jesi Cha is a 72 y.o. female.    ----------------------------  HTN (hypertension)  Hypokalemia  Obesity, unspecified  Thyroid cyst     Chief Complaint: Follow-up and Hypertension (Right lower leg wound " healed")    HTN - patient quit taking all of her prescription medications 2 months ago. She has not been monitoring her blood pressure closely since then. She has started a variety of supplements which she says came up in her online research. Initial BP was >200/100 today and repeat was 180/100. She is asymptomatic.    Review of Systems   Constitutional:  Negative for chills and fever.   HENT:  Negative for sinus pressure/congestion and sore throat.    Respiratory:  Negative for cough, shortness of breath and wheezing.    Cardiovascular:  Negative for chest pain and leg swelling.   Gastrointestinal:  Negative for abdominal pain, nausea and vomiting.   Genitourinary:  Negative for dysuria and hematuria.   Musculoskeletal:  Negative for arthralgias and myalgias.   Integumentary:  Negative for rash.   Neurological:  Negative for dizziness and syncope.   Hematological:  Negative for adenopathy.   Psychiatric/Behavioral:  Negative for confusion. The patient is not nervous/anxious.          Objective:      Physical Exam  Vitals and nursing note reviewed.   Constitutional:       General: She is not in acute distress.  HENT:      Head: Normocephalic.      Nose: No rhinorrhea.   Eyes:      Conjunctiva/sclera: Conjunctivae normal.      Pupils: Pupils are equal, round, and reactive to light.   Cardiovascular:      Rate and Rhythm: Normal rate and regular rhythm.   Pulmonary:      Effort: Pulmonary effort is normal.      Breath sounds: Normal breath sounds.   Abdominal:      Palpations: Abdomen is soft.      Tenderness: There is no abdominal tenderness.   Musculoskeletal:         General: No deformity or signs of injury.   Skin:     General: Skin is warm and dry.   Neurological:      " General: No focal deficit present.      Mental Status: She is alert. Mental status is at baseline.   Psychiatric:         Mood and Affect: Mood normal.         Behavior: Behavior normal.         Assessment & Plan:   1. Primary hypertension  Assessment & Plan:  Dangerously uncontrolled sec to non-compliance with all Rx medications  Best reading 180/100 today. Asymptomatic.   We'll restart losartan 50 mg and Nifedipine 60 mg BID.  She was previously also taking hydralazine 50 mg TID, aldactone 25 mg daily, and metoprolol succ 25 mg BID as well. Will likely need to restart other antihypertensives.  She has Nephrology f/u Friday so BP can be checked there.  She will write down her BP twice daily.  She is also getting a 2 week nurse visit here for BP check.     Counseled extensively, with her  present, of the dangers of non-compliance with BP meds. Uncontrolled BP increases the risk of renal failure (which she's already close to), stroke, and heart disease. Supplements are absolutely not effective at lowering blood pressure that is this severely elevated. Both expressed understanding. ED precautions including sudden HA, CP, SOB, etc discussed.     Orders:  -     NIFEdipine (PROCARDIA-XL) 60 MG (OSM) 24 hr tablet; Take 1 tablet (60 mg total) by mouth 2 (two) times a day.  Dispense: 180 tablet; Refill: 3  -     losartan (COZAAR) 50 MG tablet; Take 1 tablet (50 mg total) by mouth once daily.  Dispense: 90 tablet; Refill: 3    2. Class 2 severe obesity due to excess calories with serious comorbidity and body mass index (BMI) of 35.0 to 35.9 in adult  Assessment & Plan:  Weight stable approx 200 lbs. Pt reports much healthier diet recently. Encouraged calorie restriction      3. Chronic kidney disease (CKD), stage IV (severe)  Assessment & Plan:  Follows Nephro (Dr. Clemons).   Recent eGFR 18, Stg IV CKD  Counseled on importance of BP control, see above      4. Chronic ulcer of right ankle with fat layer  exposed  Assessment & Plan:  Has fully healed, will resolve problem today        Follow up in about 3 months (around 9/14/2023) for HTN. In addition to their scheduled follow up, the patient has also been instructed to follow up on as needed basis.

## 2023-06-14 NOTE — ASSESSMENT & PLAN NOTE
Dangerously uncontrolled sec to non-compliance with all Rx medications  Best reading 180/100 today. Asymptomatic.   We'll restart losartan 50 mg and Nifedipine 60 mg BID.  She was previously also taking hydralazine 50 mg TID, aldactone 25 mg daily, and metoprolol succ 25 mg BID as well. Will likely need to restart other antihypertensives.  She has Nephrology f/u Friday so BP can be checked there.  She will write down her BP twice daily.  She is also getting a 2 week nurse visit here for BP check.     Counseled extensively, with her  present, of the dangers of non-compliance with BP meds. Uncontrolled BP increases the risk of renal failure (which she's already close to), stroke, and heart disease. Supplements are absolutely not effective at lowering blood pressure that is this severely elevated. Both expressed understanding. ED precautions including sudden HA, CP, SOB, etc discussed.

## 2023-06-14 NOTE — ASSESSMENT & PLAN NOTE
Weight stable approx 200 lbs. Pt reports much healthier diet recently. Encouraged calorie restriction

## 2023-06-16 ENCOUNTER — OFFICE VISIT (OUTPATIENT)
Dept: NEPHROLOGY | Facility: CLINIC | Age: 73
End: 2023-06-16
Payer: MEDICARE

## 2023-06-16 VITALS
DIASTOLIC BLOOD PRESSURE: 90 MMHG | OXYGEN SATURATION: 96 % | HEIGHT: 63 IN | TEMPERATURE: 98 F | BODY MASS INDEX: 35.24 KG/M2 | SYSTOLIC BLOOD PRESSURE: 156 MMHG | RESPIRATION RATE: 20 BRPM | WEIGHT: 198.88 LBS | HEART RATE: 71 BPM

## 2023-06-16 DIAGNOSIS — I10 PRIMARY HYPERTENSION: ICD-10-CM

## 2023-06-16 DIAGNOSIS — I10 HYPERTENSION, UNSPECIFIED TYPE: ICD-10-CM

## 2023-06-16 DIAGNOSIS — E87.6 HYPOKALEMIA: ICD-10-CM

## 2023-06-16 DIAGNOSIS — R80.1 PERSISTENT PROTEINURIA: ICD-10-CM

## 2023-06-16 DIAGNOSIS — N18.30 STAGE 3 CHRONIC KIDNEY DISEASE, UNSPECIFIED WHETHER STAGE 3A OR 3B CKD: ICD-10-CM

## 2023-06-16 DIAGNOSIS — N18.4 CKD (CHRONIC KIDNEY DISEASE) STAGE 4, GFR 15-29 ML/MIN: Primary | ICD-10-CM

## 2023-06-16 DIAGNOSIS — N25.81 SECONDARY HYPERPARATHYROIDISM OF RENAL ORIGIN: ICD-10-CM

## 2023-06-16 PROCEDURE — 3008F PR BODY MASS INDEX (BMI) DOCUMENTED: ICD-10-PCS | Mod: CPTII,S$GLB,,

## 2023-06-16 PROCEDURE — 1159F PR MEDICATION LIST DOCUMENTED IN MEDICAL RECORD: ICD-10-PCS | Mod: CPTII,S$GLB,,

## 2023-06-16 PROCEDURE — 4010F PR ACE/ARB THEARPY RXD/TAKEN: ICD-10-PCS | Mod: CPTII,S$GLB,,

## 2023-06-16 PROCEDURE — 3066F PR DOCUMENTATION OF TREATMENT FOR NEPHROPATHY: ICD-10-PCS | Mod: CPTII,S$GLB,,

## 2023-06-16 PROCEDURE — 1101F PR PT FALLS ASSESS DOC 0-1 FALLS W/OUT INJ PAST YR: ICD-10-PCS | Mod: CPTII,S$GLB,,

## 2023-06-16 PROCEDURE — 99999 PR PBB SHADOW E&M-EST. PATIENT-LVL IV: ICD-10-PCS | Mod: PBBFAC,,,

## 2023-06-16 PROCEDURE — 3288F PR FALLS RISK ASSESSMENT DOCUMENTED: ICD-10-PCS | Mod: CPTII,S$GLB,,

## 2023-06-16 PROCEDURE — 3080F PR MOST RECENT DIASTOLIC BLOOD PRESSURE >= 90 MM HG: ICD-10-PCS | Mod: CPTII,S$GLB,,

## 2023-06-16 PROCEDURE — 1125F AMNT PAIN NOTED PAIN PRSNT: CPT | Mod: CPTII,S$GLB,,

## 2023-06-16 PROCEDURE — 1125F PR PAIN SEVERITY QUANTIFIED, PAIN PRESENT: ICD-10-PCS | Mod: CPTII,S$GLB,,

## 2023-06-16 PROCEDURE — 3077F PR MOST RECENT SYSTOLIC BLOOD PRESSURE >= 140 MM HG: ICD-10-PCS | Mod: CPTII,S$GLB,,

## 2023-06-16 PROCEDURE — 1101F PT FALLS ASSESS-DOCD LE1/YR: CPT | Mod: CPTII,S$GLB,,

## 2023-06-16 PROCEDURE — 3080F DIAST BP >= 90 MM HG: CPT | Mod: CPTII,S$GLB,,

## 2023-06-16 PROCEDURE — 3066F NEPHROPATHY DOC TX: CPT | Mod: CPTII,S$GLB,,

## 2023-06-16 PROCEDURE — 3008F BODY MASS INDEX DOCD: CPT | Mod: CPTII,S$GLB,,

## 2023-06-16 PROCEDURE — 4010F ACE/ARB THERAPY RXD/TAKEN: CPT | Mod: CPTII,S$GLB,,

## 2023-06-16 PROCEDURE — 99215 OFFICE O/P EST HI 40 MIN: CPT | Mod: S$GLB,,,

## 2023-06-16 PROCEDURE — 3077F SYST BP >= 140 MM HG: CPT | Mod: CPTII,S$GLB,,

## 2023-06-16 PROCEDURE — 3288F FALL RISK ASSESSMENT DOCD: CPT | Mod: CPTII,S$GLB,,

## 2023-06-16 PROCEDURE — 99999 PR PBB SHADOW E&M-EST. PATIENT-LVL IV: CPT | Mod: PBBFAC,,,

## 2023-06-16 PROCEDURE — 1159F MED LIST DOCD IN RCRD: CPT | Mod: CPTII,S$GLB,,

## 2023-06-16 PROCEDURE — 99215 PR OFFICE/OUTPT VISIT, EST, LEVL V, 40-54 MIN: ICD-10-PCS | Mod: S$GLB,,,

## 2023-06-16 RX ORDER — NORTRIPTYLINE HYDROCHLORIDE 50 MG/1
50 CAPSULE ORAL NIGHTLY
COMMUNITY
Start: 2023-06-02 | End: 2023-07-14

## 2023-06-16 RX ORDER — CALCITRIOL 0.25 UG/1
0.25 CAPSULE ORAL DAILY
Qty: 30 CAPSULE | Refills: 11 | Status: SHIPPED | OUTPATIENT
Start: 2023-06-16 | End: 2024-06-15

## 2023-06-16 RX ORDER — METOPROLOL SUCCINATE 25 MG/1
25 TABLET, EXTENDED RELEASE ORAL 2 TIMES DAILY
Qty: 180 TABLET | Refills: 3 | Status: SHIPPED | OUTPATIENT
Start: 2023-06-16 | End: 2024-06-15

## 2023-06-16 RX ORDER — POTASSIUM CHLORIDE 20 MEQ/1
20 TABLET, EXTENDED RELEASE ORAL DAILY
Qty: 30 TABLET | Refills: 6 | Status: SHIPPED | OUTPATIENT
Start: 2023-06-16 | End: 2023-06-16

## 2023-06-16 RX ORDER — SPIRONOLACTONE 25 MG/1
25 TABLET ORAL DAILY
Qty: 90 TABLET | Refills: 3 | Status: SHIPPED | OUTPATIENT
Start: 2023-06-16 | End: 2024-06-15

## 2023-06-16 RX ORDER — HYDRALAZINE HYDROCHLORIDE 50 MG/1
50 TABLET, FILM COATED ORAL 3 TIMES DAILY
Qty: 270 TABLET | Refills: 3 | Status: SHIPPED | OUTPATIENT
Start: 2023-06-16 | End: 2024-06-15

## 2023-06-16 NOTE — PROGRESS NOTES
[facility] [place] [facility] Progress Note      HPI  Jesi Cha, 72 y.o. female, presents to office for a follow up visit for CKD 4 related to nephrosclerosis.  Patient has history of uncontrolled hypertension and is on multiple antihypertensives.  We also see her for hypokalemia and hyperparathyroidism of renal origin.  We have checks patient's renin aldosterone ratio in the past and although she had high aldosterone, ratio was within normal limits.  We also checked renal Doppler ultrasound which was negative for renal artery stenosis.    Patient has been out of her hydralazine, metoprolol, and Aldactone since around March.  She would not let anyone know that she needed refills.  She also has been out of calcitriol.  Blood pressure has not been controlled.    She has no acute complaints.    Patient denies taking NSAIDs or new antibiotics. Also denies recent episode of dehydration, diarrhea, vomiting, acute illness, hospitalization, recent angiograms or exposure to IV radiocontrast.      Medical Diagnoses:   Past Medical History:   Diagnosis Date    HTN (hypertension)     Hypokalemia     Obesity, unspecified     Thyroid cyst      Patient Active Problem List   Diagnosis    Class 2 severe obesity due to excess calories with serious comorbidity and body mass index (BMI) of 35.0 to 35.9 in adult    Cyst of thyroid    Hypokalemia    Primary hypertension    Mitral regurgitation    Mild aortic regurgitation    CKD (chronic kidney disease) stage 4, GFR 15-29 ml/min    Sinus arrhythmia    Persistent proteinuria    Secondary hyperparathyroidism of renal origin       Surgical History:   Past Surgical History:   Procedure Laterality Date    APPENDECTOMY       SECTION      CHOLECYSTECTOMY         Family History:   Family History   Problem Relation Age of Onset    Arthritis Mother        Social History:   Social History     Tobacco Use    Smoking status: Never     Passive exposure: Never    Smokeless tobacco: Never    Substance Use Topics    Alcohol use: Never       Allergies:  Review of patient's allergies indicates:   Allergen Reactions    Codeine        Medications:    Current Outpatient Medications:     aspirin 81 mg Cap, Take 81 mg by mouth once daily at 6am., Disp: , Rfl:     calcitRIOL (ROCALTROL) 0.25 MCG Cap, Take 1 capsule (0.25 mcg total) by mouth once daily., Disp: 30 capsule, Rfl: 11    diclofenac sodium (VOLTAREN) 1 % Gel, APPLY 4 GRAMS TOPICALLY 4 TIMES DAILY AS NEEDED FOR PAIN APPLY A THIN FILM TO EACH KNEE AS NEEDED FOR ARTHRITIS PAIN. NOT TO EXCEED 16 GRAMS/DAY/SINGLE JOINT OF LOWER EXTREMITIES., Disp: , Rfl:     fluticasone propionate (FLONASE) 50 mcg/actuation nasal spray, USE 1 TO 2 SPRAY(S) IN EACH NOSTRIL TWICE DAILY FOR ALLERGY SYMPTOMS, Disp: , Rfl:     losartan (COZAAR) 50 MG tablet, Take 1 tablet (50 mg total) by mouth once daily., Disp: 90 tablet, Rfl: 3    metoprolol succinate (TOPROL-XL) 25 MG 24 hr tablet, Take 25 mg by mouth 2 (two) times daily., Disp: , Rfl:     NIFEdipine (PROCARDIA-XL) 60 MG (OSM) 24 hr tablet, Take 1 tablet (60 mg total) by mouth 2 (two) times a day., Disp: 180 tablet, Rfl: 3    nitroGLYCERIN (NITROSTAT) 0.4 MG SL tablet, Place 0.4 mg under the tongue., Disp: , Rfl:     nortriptyline (PAMELOR) 50 MG capsule, Take 50 mg by mouth every evening., Disp: , Rfl:     hydrALAZINE (APRESOLINE) 50 MG tablet, Take 1 tablet (50 mg total) by mouth 3 (three) times daily. (Patient not taking: Reported on 6/16/2023), Disp: 90 tablet, Rfl: 2    spironolactone (ALDACTONE) 25 MG tablet, Take 1 tablet (25 mg total) by mouth once daily. (Patient not taking: Reported on 6/16/2023), Disp: 90 tablet, Rfl: 0       Review of Systems:    Constitutional: Denies fever, fatigue, generalized weakness  Skin: Denies wounds, no rashes, no itching, no new skin lesions  Respiratory:  Denies cough, shortness of breath, or wheezing  Cardiovascular: Denies chest pain, palpitations, or swelling  Gastrointestional:  "Denies abdominal pain, nausea, vomiting, diarrhea, or constipation  Genitourinary: Denies dysuria, hematuria, foamy urine, or incontinence; reports able to empty bladder  Musculoskeletal: Denies back or flank pain  Neurological: Denies headaches, dizziness, paresthesias, tremors or focal weakness      Vital Signs:  BP (!) 156/90 (BP Location: Right arm, Patient Position: Sitting)   Pulse 71   Temp 97.5 °F (36.4 °C) (Temporal)   Resp 20   Ht 5' 3" (1.6 m)   Wt 90.2 kg (198 lb 13.7 oz)   SpO2 96%   BMI 35.23 kg/m²   Body mass index is 35.23 kg/m².      Physical Exam:    General: no acute distress, awake, alert  Eyes: conjunctiva clear, eyelids without swelling  HENT: atraumatic, oropharynx and nasal mucosa patent  Neck: supple, trache midline, no JVD  Respiratory: equal, unlabored, clear to auscultation A/P  Cardiovascular: RRR without rub  Edema: trace ankle BL  Gastrointestinal: soft, non-tender, non-distended; positive bowel sounds; no masses to palpation; no ascites  Genitourinary: no CVA tenderness upon palpation  Musculoskeletal: ROM without new limitation or discomfort  Integumentary: warm, dry; no rashes, wounds, or skin lesions  Neurological: oriented x4, appropriate, no acute deficits; no asterixis      Labs:        Component Value Date/Time     (H) 06/12/2023 1401     (H) 03/06/2023 1023    K 3.4 (L) 06/12/2023 1401    K 3.4 (L) 03/06/2023 1023    CO2 25 06/12/2023 1401    CO2 28 03/06/2023 1023    BUN 41.1 (H) 06/12/2023 1401    BUN 49.4 (H) 03/06/2023 1023    CREATININE 2.72 (H) 06/12/2023 1401    CREATININE 2.44 (H) 03/06/2023 1023    CREATININE 2.55 (H) 12/17/2022 1115    CREATININE 2.39 (H) 12/08/2022 1213    CALCIUM 9.2 06/12/2023 1401    CALCIUM 9.8 03/06/2023 1023    PHOS 3.5 06/12/2023 1401    PHOS 3.3 03/06/2023 1023    .7 (H) 06/12/2023 1401    .4 (H) 03/06/2023 1023    .9 (H) 12/08/2022 1213           Component Value Date/Time    WBC 5.31 06/12/2023 1401 "    WBC 5.3 03/06/2023 1023    HGB 13.8 06/12/2023 1401    HGB 14.3 03/06/2023 1023    HCT 42.7 06/12/2023 1401    HCT 44.2 03/06/2023 1023     06/12/2023 1401     03/06/2023 1023       Urine Creatinine   Date Value Ref Range Status   06/12/2023 116.8 (H) 47.0 - 110.0 mg/dL Final   03/06/2023 148.5 (H) 47.0 - 110.0 mg/dL Final       Urine Protein Level   Date Value Ref Range Status   06/12/2023 359.2 mg/dL Final   03/06/2023 418.4 mg/dL Final         Impression:    1. Stage IV chronic kidney disease  Related to nephrosclerosis with uncontrolled hypertension.  Serum immuno electrophoresis negative.  MANUEL negative.  Complements within normal limits.  Unfortunately renal function is progressively worsening.  Blood pressure continues to be uncontrolled as patient has been out of blood pressure medication.    We will recheck renin and aldosterone levels and if aldosterone remains elevated, may check adrenal glands.      2. Primary hypertension  Patient has only been taking losartan and nifedipine.    We will refill hydralazine, metoprolol, and spironolactone    At this point do not want to stop angiotensin receptor blocker due to increase in proteinuria as well as blood pressure control.      3. Secondary hyperparathyroidism of renal origin  Apparently patient has not been taking calcitriol for about 3 months.  We will refill calcitriol 0.25 mcg and order it for daily      4. Hypokalemia  Refill Aldactone as patient has been out for about 3 months      5. Persistent proteinuria  Proteinuria increasing.  Expected with uncontrolled hypertension and advanced renal disease.  Serological workup negative.  Advised low-protein diet.          Began a discussion with patient on the progression of her renal disease and the high possibility of needing dialysis in the future to begin thinking about vascular access and renal replacement therapy options. Patient would be a very high-risk for renal biopsy due to  uncontrolled hypertension.  Had a thorough discussion with patient about needing to be responsible for her blood pressure medications, monitoring her blood pressure readings and letting us know when she is out of medication.     Plan:  Due to worsening renal function and proteinuria we will follow up closely with labs in 3 weeks an appointment in 4 weeks with Dr. Clemons.    Patient to call our office with any concerns prior to next appointment.    Avoid NSAIDs (Aleve, Mobic, Celebrex, Ibuprofen, Advil, Toradol and Diclofenac).  Only take tylenol occasionally if needed for aches/pains.    Recommend low sodium diet:  Avoid high salt foods (olives, pickles, smoked meats, deli meats, salted potato chips, etc.).   Do not add salt to your food at the table.   Use only small amounts of salt when cooking.    Recommended Daily Protein Intake for chronic kidney disease:  0.8 g/kg      MYRTLE Clement

## 2023-07-11 ENCOUNTER — LAB VISIT (OUTPATIENT)
Dept: LAB | Facility: HOSPITAL | Age: 73
End: 2023-07-11
Payer: MEDICARE

## 2023-07-11 DIAGNOSIS — R80.1 PERSISTENT PROTEINURIA: ICD-10-CM

## 2023-07-11 DIAGNOSIS — N25.81 SECONDARY HYPERPARATHYROIDISM OF RENAL ORIGIN: ICD-10-CM

## 2023-07-11 DIAGNOSIS — E87.6 HYPOKALEMIA: ICD-10-CM

## 2023-07-11 DIAGNOSIS — I10 PRIMARY HYPERTENSION: ICD-10-CM

## 2023-07-11 DIAGNOSIS — N18.4 CKD (CHRONIC KIDNEY DISEASE) STAGE 4, GFR 15-29 ML/MIN: ICD-10-CM

## 2023-07-11 LAB
ALBUMIN SERPL-MCNC: 3.6 G/DL (ref 3.4–4.8)
ALBUMIN/GLOB SERPL: 1.1 RATIO (ref 1.1–2)
ALP SERPL-CCNC: 68 UNIT/L (ref 40–150)
ALT SERPL-CCNC: 14 UNIT/L (ref 0–55)
APPEARANCE UR: CLEAR
AST SERPL-CCNC: 17 UNIT/L (ref 5–34)
BACTERIA #/AREA URNS AUTO: NORMAL /HPF
BASOPHILS # BLD AUTO: 0.02 X10(3)/MCL
BASOPHILS NFR BLD AUTO: 0.3 %
BILIRUB UR QL STRIP.AUTO: NEGATIVE MG/DL
BILIRUBIN DIRECT+TOT PNL SERPL-MCNC: 0.3 MG/DL
BUN SERPL-MCNC: 66.7 MG/DL (ref 9.8–20.1)
CALCIUM SERPL-MCNC: 9.4 MG/DL (ref 8.4–10.2)
CHLORIDE SERPL-SCNC: 110 MMOL/L (ref 98–107)
CO2 SERPL-SCNC: 25 MMOL/L (ref 23–31)
COLOR UR: YELLOW
CREAT SERPL-MCNC: 3 MG/DL (ref 0.55–1.02)
CREAT UR-MCNC: 104.2 MG/DL (ref 45–106)
EOSINOPHIL # BLD AUTO: 0.14 X10(3)/MCL (ref 0–0.9)
EOSINOPHIL NFR BLD AUTO: 2.2 %
ERYTHROCYTE [DISTWIDTH] IN BLOOD BY AUTOMATED COUNT: 14.1 % (ref 11.5–17)
GFR SERPLBLD CREATININE-BSD FMLA CKD-EPI: 16 MLS/MIN/1.73/M2
GLOBULIN SER-MCNC: 3.4 GM/DL (ref 2.4–3.5)
GLUCOSE SERPL-MCNC: 94 MG/DL (ref 82–115)
GLUCOSE UR QL STRIP.AUTO: NEGATIVE MG/DL
HCT VFR BLD AUTO: 39.9 % (ref 37–47)
HGB BLD-MCNC: 12.6 G/DL (ref 12–16)
IMM GRANULOCYTES # BLD AUTO: 0 X10(3)/MCL (ref 0–0.04)
IMM GRANULOCYTES NFR BLD AUTO: 0 %
KETONES UR QL STRIP.AUTO: NEGATIVE MG/DL
LEUKOCYTE ESTERASE UR QL STRIP.AUTO: NEGATIVE UNIT/L
LYMPHOCYTES # BLD AUTO: 2.24 X10(3)/MCL (ref 0.6–4.6)
LYMPHOCYTES NFR BLD AUTO: 35.4 %
MCH RBC QN AUTO: 28.7 PG (ref 27–31)
MCHC RBC AUTO-ENTMCNC: 31.6 G/DL (ref 33–36)
MCV RBC AUTO: 90.9 FL (ref 80–94)
MONOCYTES # BLD AUTO: 0.54 X10(3)/MCL (ref 0.1–1.3)
MONOCYTES NFR BLD AUTO: 8.5 %
NEUTROPHILS # BLD AUTO: 3.38 X10(3)/MCL (ref 2.1–9.2)
NEUTROPHILS NFR BLD AUTO: 53.6 %
NITRITE UR QL STRIP.AUTO: NEGATIVE
PH UR STRIP.AUTO: 6 [PH]
PHOSPHATE SERPL-MCNC: 3.1 MG/DL (ref 2.3–4.7)
PLATELET # BLD AUTO: 189 X10(3)/MCL (ref 130–400)
PMV BLD AUTO: 9.5 FL (ref 7.4–10.4)
POTASSIUM SERPL-SCNC: 4.7 MMOL/L (ref 3.5–5.1)
PROT SERPL-MCNC: 7 GM/DL (ref 5.8–7.6)
PROT UR QL STRIP.AUTO: 100 MG/DL
PROT UR STRIP-MCNC: 75.7 MG/DL
PTH-INTACT SERPL-MCNC: 152.5 PG/ML (ref 8.7–77)
RBC # BLD AUTO: 4.39 X10(6)/MCL (ref 4.2–5.4)
RBC #/AREA URNS AUTO: NORMAL /HPF
RBC UR QL AUTO: NEGATIVE UNIT/L
SODIUM SERPL-SCNC: 145 MMOL/L (ref 136–145)
SP GR UR STRIP.AUTO: 1.02
SQUAMOUS #/AREA URNS AUTO: NORMAL /HPF
URINE PROTEIN/CREATININE RATIO (OHS): 0.7
UROBILINOGEN UR STRIP-ACNC: 0.2 MG/DL
WBC # SPEC AUTO: 6.32 X10(3)/MCL (ref 4.5–11.5)
WBC #/AREA URNS AUTO: NORMAL /HPF

## 2023-07-11 PROCEDURE — 85025 COMPLETE CBC W/AUTO DIFF WBC: CPT

## 2023-07-11 PROCEDURE — 81001 URINALYSIS AUTO W/SCOPE: CPT

## 2023-07-11 PROCEDURE — 84244 ASSAY OF RENIN: CPT

## 2023-07-11 PROCEDURE — 80053 COMPREHEN METABOLIC PANEL: CPT

## 2023-07-11 PROCEDURE — 82570 ASSAY OF URINE CREATININE: CPT

## 2023-07-11 PROCEDURE — 84100 ASSAY OF PHOSPHORUS: CPT

## 2023-07-11 PROCEDURE — 82088 ASSAY OF ALDOSTERONE: CPT

## 2023-07-11 PROCEDURE — 83970 ASSAY OF PARATHORMONE: CPT

## 2023-07-11 PROCEDURE — 36415 COLL VENOUS BLD VENIPUNCTURE: CPT

## 2023-07-13 LAB — ALDOST SERPL-MCNC: 21 NG/DL

## 2023-07-14 ENCOUNTER — OFFICE VISIT (OUTPATIENT)
Dept: NEPHROLOGY | Facility: CLINIC | Age: 73
End: 2023-07-14
Payer: MEDICARE

## 2023-07-14 VITALS
RESPIRATION RATE: 20 BRPM | TEMPERATURE: 98 F | HEIGHT: 63 IN | WEIGHT: 203.25 LBS | HEART RATE: 72 BPM | OXYGEN SATURATION: 98 % | SYSTOLIC BLOOD PRESSURE: 160 MMHG | BODY MASS INDEX: 36.01 KG/M2 | DIASTOLIC BLOOD PRESSURE: 90 MMHG

## 2023-07-14 DIAGNOSIS — I10 PRIMARY HYPERTENSION: ICD-10-CM

## 2023-07-14 DIAGNOSIS — N18.4 CKD (CHRONIC KIDNEY DISEASE) STAGE 4, GFR 15-29 ML/MIN: Primary | ICD-10-CM

## 2023-07-14 DIAGNOSIS — E87.6 HYPOKALEMIA: ICD-10-CM

## 2023-07-14 DIAGNOSIS — R80.1 PERSISTENT PROTEINURIA: ICD-10-CM

## 2023-07-14 DIAGNOSIS — N25.81 SECONDARY HYPERPARATHYROIDISM OF RENAL ORIGIN: ICD-10-CM

## 2023-07-14 LAB — RENIN PLAS-CCNC: 1.2 NG/ML/H

## 2023-07-14 PROCEDURE — 99214 OFFICE O/P EST MOD 30 MIN: CPT | Mod: S$GLB,,, | Performed by: INTERNAL MEDICINE

## 2023-07-14 PROCEDURE — 4010F ACE/ARB THERAPY RXD/TAKEN: CPT | Mod: CPTII,S$GLB,, | Performed by: INTERNAL MEDICINE

## 2023-07-14 PROCEDURE — 4010F PR ACE/ARB THEARPY RXD/TAKEN: ICD-10-PCS | Mod: CPTII,S$GLB,, | Performed by: INTERNAL MEDICINE

## 2023-07-14 PROCEDURE — 1101F PT FALLS ASSESS-DOCD LE1/YR: CPT | Mod: CPTII,S$GLB,, | Performed by: INTERNAL MEDICINE

## 2023-07-14 PROCEDURE — 3077F SYST BP >= 140 MM HG: CPT | Mod: CPTII,S$GLB,, | Performed by: INTERNAL MEDICINE

## 2023-07-14 PROCEDURE — 3077F PR MOST RECENT SYSTOLIC BLOOD PRESSURE >= 140 MM HG: ICD-10-PCS | Mod: CPTII,S$GLB,, | Performed by: INTERNAL MEDICINE

## 2023-07-14 PROCEDURE — 1101F PR PT FALLS ASSESS DOC 0-1 FALLS W/OUT INJ PAST YR: ICD-10-PCS | Mod: CPTII,S$GLB,, | Performed by: INTERNAL MEDICINE

## 2023-07-14 PROCEDURE — 1159F PR MEDICATION LIST DOCUMENTED IN MEDICAL RECORD: ICD-10-PCS | Mod: CPTII,S$GLB,, | Performed by: INTERNAL MEDICINE

## 2023-07-14 PROCEDURE — 3288F PR FALLS RISK ASSESSMENT DOCUMENTED: ICD-10-PCS | Mod: CPTII,S$GLB,, | Performed by: INTERNAL MEDICINE

## 2023-07-14 PROCEDURE — 1126F AMNT PAIN NOTED NONE PRSNT: CPT | Mod: CPTII,S$GLB,, | Performed by: INTERNAL MEDICINE

## 2023-07-14 PROCEDURE — 3066F NEPHROPATHY DOC TX: CPT | Mod: CPTII,S$GLB,, | Performed by: INTERNAL MEDICINE

## 2023-07-14 PROCEDURE — 3066F PR DOCUMENTATION OF TREATMENT FOR NEPHROPATHY: ICD-10-PCS | Mod: CPTII,S$GLB,, | Performed by: INTERNAL MEDICINE

## 2023-07-14 PROCEDURE — 99999 PR PBB SHADOW E&M-EST. PATIENT-LVL III: CPT | Mod: PBBFAC,,, | Performed by: INTERNAL MEDICINE

## 2023-07-14 PROCEDURE — 99999 PR PBB SHADOW E&M-EST. PATIENT-LVL III: ICD-10-PCS | Mod: PBBFAC,,, | Performed by: INTERNAL MEDICINE

## 2023-07-14 PROCEDURE — 1159F MED LIST DOCD IN RCRD: CPT | Mod: CPTII,S$GLB,, | Performed by: INTERNAL MEDICINE

## 2023-07-14 PROCEDURE — 3288F FALL RISK ASSESSMENT DOCD: CPT | Mod: CPTII,S$GLB,, | Performed by: INTERNAL MEDICINE

## 2023-07-14 PROCEDURE — 1126F PR PAIN SEVERITY QUANTIFIED, NO PAIN PRESENT: ICD-10-PCS | Mod: CPTII,S$GLB,, | Performed by: INTERNAL MEDICINE

## 2023-07-14 PROCEDURE — 3080F DIAST BP >= 90 MM HG: CPT | Mod: CPTII,S$GLB,, | Performed by: INTERNAL MEDICINE

## 2023-07-14 PROCEDURE — 3080F PR MOST RECENT DIASTOLIC BLOOD PRESSURE >= 90 MM HG: ICD-10-PCS | Mod: CPTII,S$GLB,, | Performed by: INTERNAL MEDICINE

## 2023-07-14 PROCEDURE — 3008F BODY MASS INDEX DOCD: CPT | Mod: CPTII,S$GLB,, | Performed by: INTERNAL MEDICINE

## 2023-07-14 PROCEDURE — 99214 PR OFFICE/OUTPT VISIT, EST, LEVL IV, 30-39 MIN: ICD-10-PCS | Mod: S$GLB,,, | Performed by: INTERNAL MEDICINE

## 2023-07-14 PROCEDURE — 3008F PR BODY MASS INDEX (BMI) DOCUMENTED: ICD-10-PCS | Mod: CPTII,S$GLB,, | Performed by: INTERNAL MEDICINE

## 2023-07-14 RX ORDER — POTASSIUM CHLORIDE 20 MEQ/1
20 TABLET, EXTENDED RELEASE ORAL DAILY
COMMUNITY
Start: 2023-06-16

## 2023-07-14 NOTE — PROGRESS NOTES
[facility] [place] [facility] [place] [facility]      HPI  Jesi Cha, 72 y.o. female, presents to office for a follow up visit for CKD 4.  Patient also has severe uncontrolled hypertension recently she ran out of blood pressure medications she went back to taking them she said blood pressure is better at home she admitted to having soup with a high salt intake recently.  Patient denies dyspnea abdominal pain nausea vomiting or itching.    Patient denies taking NSAIDs or new antibiotics. Also denies recent episode of dehydration, diarrhea, vomiting, acute illness, hospitalization, recent angiograms or exposure to IV radiocontrast.        Medical Diagnoses:   Past Medical History:   Diagnosis Date    HTN (hypertension)     Hypokalemia     Obesity, unspecified     Thyroid cyst      Patient Active Problem List   Diagnosis    Class 2 severe obesity due to excess calories with serious comorbidity and body mass index (BMI) of 35.0 to 35.9 in adult    Cyst of thyroid    Hypokalemia    Primary hypertension    Mitral regurgitation    Mild aortic regurgitation    CKD (chronic kidney disease) stage 4, GFR 15-29 ml/min    Sinus arrhythmia    Persistent proteinuria    Secondary hyperparathyroidism of renal origin       Surgical History:   Past Surgical History:   Procedure Laterality Date    APPENDECTOMY       SECTION      CHOLECYSTECTOMY         Family History:   Family History   Problem Relation Age of Onset    Arthritis Mother        Social History:   Social History     Tobacco Use    Smoking status: Never     Passive exposure: Never    Smokeless tobacco: Never   Substance Use Topics    Alcohol use: Never       Allergies:  Review of patient's allergies indicates:   Allergen Reactions    Codeine        Medications:    Current Outpatient Medications:     aspirin 81 mg Cap, Take 81 mg by mouth once daily at 6am., Disp: , Rfl:     calcitRIOL (ROCALTROL) 0.25 MCG Cap, Take 1 capsule (0.25 mcg total) by mouth once daily.,  "Disp: 30 capsule, Rfl: 11    hydrALAZINE (APRESOLINE) 50 MG tablet, Take 1 tablet (50 mg total) by mouth 3 (three) times daily., Disp: 270 tablet, Rfl: 3    losartan (COZAAR) 50 MG tablet, Take 1 tablet (50 mg total) by mouth once daily., Disp: 90 tablet, Rfl: 3    metoprolol succinate (TOPROL-XL) 25 MG 24 hr tablet, Take 1 tablet (25 mg total) by mouth 2 (two) times daily., Disp: 180 tablet, Rfl: 3    NIFEdipine (PROCARDIA-XL) 60 MG (OSM) 24 hr tablet, Take 1 tablet (60 mg total) by mouth 2 (two) times a day., Disp: 180 tablet, Rfl: 3    nitroGLYCERIN (NITROSTAT) 0.4 MG SL tablet, Place 0.4 mg under the tongue., Disp: , Rfl:     potassium chloride SA (K-DUR,KLOR-CON) 20 MEQ tablet, Take 20 mEq by mouth Daily., Disp: , Rfl:     spironolactone (ALDACTONE) 25 MG tablet, Take 1 tablet (25 mg total) by mouth once daily., Disp: 90 tablet, Rfl: 3       Review of Systems:    Constitutional: Denies fever, fatigue, generalized weakness  Skin: Denies wounds, no rashes, no itching, no new skin lesions  Respiratory:  Denies cough, shortness of breath, or wheezing  Cardiovascular: Denies chest pain, palpitations, or swelling  Gastrointestional: Denies abdominal pain, nausea, vomiting, diarrhea, or constipation  Genitourinary: Denies dysuria, hematuria, foamy urine, or incontinence; reports able to empty bladder  Musculoskeletal: Denies back or flank pain  Neurological: Denies headaches, dizziness, paresthesias, tremors or focal weakness      Vital Signs:  BP (!) 160/90 (BP Location: Left arm, Patient Position: Sitting)   Pulse 72   Temp 97.7 °F (36.5 °C) (Temporal)   Resp 20   Ht 5' 3" (1.6 m)   Wt 92.2 kg (203 lb 4.2 oz)   SpO2 98%   BMI 36.01 kg/m²   Body mass index is 36.01 kg/m².      Physical Exam:    General: no acute distress, awake, alert  Eyes: conjunctiva clear, eyelids without swelling  HENT: atraumatic, oropharynx and nasal mucosa patent  Neck: supple, trache midline, no JVD  Respiratory: equal, unlabored, " clear to auscultation A/P  Cardiovascular: RRR without rub  Edema: none  Gastrointestinal: soft, non-tender, non-distended; positive bowel sounds; no masses to palpation; no ascites  Genitourinary: no CVA tenderness upon palpation  Musculoskeletal: ROM without new limitation or discomfort  Integumentary: warm, dry; no rashes, wounds, or skin lesions  Neurological: oriented x4, appropriate, no acute deficits; no asterixis      Labs:        Component Value Date/Time     07/11/2023 1251     (H) 06/12/2023 1401    K 4.7 07/11/2023 1251    K 3.4 (L) 06/12/2023 1401    CO2 25 07/11/2023 1251    CO2 25 06/12/2023 1401    BUN 66.7 (H) 07/11/2023 1251    BUN 41.1 (H) 06/12/2023 1401    CREATININE 3.00 (H) 07/11/2023 1251    CREATININE 2.72 (H) 06/12/2023 1401    CREATININE 2.44 (H) 03/06/2023 1023    CREATININE 2.55 (H) 12/17/2022 1115    CALCIUM 9.4 07/11/2023 1251    CALCIUM 9.2 06/12/2023 1401    PHOS 3.1 07/11/2023 1251    PHOS 3.5 06/12/2023 1401    .5 (H) 07/11/2023 1251    .7 (H) 06/12/2023 1401    .4 (H) 03/06/2023 1023           Component Value Date/Time    WBC 6.32 07/11/2023 1251    WBC 5.31 06/12/2023 1401    HGB 12.6 07/11/2023 1251    HGB 13.8 06/12/2023 1401    HCT 39.9 07/11/2023 1251    HCT 42.7 06/12/2023 1401     07/11/2023 1251     06/12/2023 1401       Urine Creatinine   Date Value Ref Range Status   07/11/2023 104.2 45.0 - 106.0 mg/dL Final   06/12/2023 116.8 (H) 47.0 - 110.0 mg/dL Final       Urine Protein Level   Date Value Ref Range Status   07/11/2023 75.7 mg/dL Final   06/12/2023 359.2 mg/dL Final           Imaging:  Retroperitoneal US:      Impression: / PLAN    1. CKD (chronic kidney disease) stage 4, GFR 15-29 ml/min        2. Primary hypertension        3. Hypokalemia        4. Secondary hyperparathyroidism of renal origin        5. Persistent proteinuria            CKD 4 progressing  Discussed situation with the patient and we need to be prepared  for the possibility of being on dialysis  Versus peritoneal dialysis she is thinking about it  Also discussed the need for permanent vascular access and need for transplant eval  Patient is open to the possibility of AV fistula and transplant eval and we will set up the plans such meanwhile compliance to medication intake low-sodium diet advised we will repeat labs follow her back in 1 month      Annabel Clemons      This note was created with the assistance of [employer] voice recognition software or phone dictation. There may be transcription errors as a result of using this technology however minimal. Effort has been made to assure accuracy of transcription but any obvious errors or omissions should be clarified with the author of the document

## 2023-07-17 ENCOUNTER — TELEPHONE (OUTPATIENT)
Dept: NEPHROLOGY | Facility: CLINIC | Age: 73
End: 2023-07-17
Payer: MEDICARE

## 2023-07-17 DIAGNOSIS — Z01.818 OTHER SPECIFIED PRE-OPERATIVE EXAMINATION: Primary | ICD-10-CM

## 2023-07-17 DIAGNOSIS — N18.5 CHRONIC KIDNEY DISEASE, STAGE V: Primary | ICD-10-CM

## 2023-07-17 DIAGNOSIS — N18.5 CHRONIC KIDNEY DISEASE, STAGE V: ICD-10-CM

## 2023-07-17 NOTE — TELEPHONE ENCOUNTER
[facility] called stating that patient refused appt for dialysis access.

## 2023-07-19 ENCOUNTER — TELEPHONE (OUTPATIENT)
Dept: TRANSPLANT | Facility: CLINIC | Age: 73
End: 2023-07-19
Payer: MEDICARE

## 2023-07-21 ENCOUNTER — TELEPHONE (OUTPATIENT)
Dept: TRANSPLANT | Facility: CLINIC | Age: 73
End: 2023-07-21
Payer: MEDICARE

## 2023-07-21 NOTE — TELEPHONE ENCOUNTER
----- Message from Cipriano Jerome MD sent at 7/21/2023  7:57 AM CDT -----  No she is not if non compliance is an active issue.   ----- Message -----  From: Michelle Abadie, RN  Sent: 7/20/2023   5:05 PM CDT  To: Cipriano Jerome MD, #    Good afternoon,  We have received a referral on the above patient. She is 71 y/o (73 in October), BMI of 36, and Predialysis. Her medical history includes HTN, mild aortic regurgitation, sinus arrhythmia, hyperparathyroidism, and non compliance with medication regimen. There is a note from nephrology on 06/16/23:   Patient has history of uncontrolled hypertension and is on multiple antihypertensives.  We also see her for hypokalemia and hyperparathyroidism of renal origin.     Patient has been out of her hydralazine, metoprolol, and Aldactone since around March.  She would not let anyone know that she needed refills.  She also has been out of calcitriol.  Blood pressure has not been controlled.      Is patient eligible for RR clinic?    Thank you,  Julia

## 2023-07-26 ENCOUNTER — TELEPHONE (OUTPATIENT)
Dept: TRANSPLANT | Facility: CLINIC | Age: 73
End: 2023-07-26
Payer: MEDICARE

## 2023-08-10 ENCOUNTER — TELEPHONE (OUTPATIENT)
Dept: NEPHROLOGY | Facility: CLINIC | Age: 73
End: 2023-08-10
Payer: MEDICARE

## 2023-08-10 NOTE — TELEPHONE ENCOUNTER
Called patient regarding lab work to be done before appt on 8-14-23, patient stated she is not ready to come to appt because she does not want dialysis and wants to cancel appt

## 2023-12-14 ENCOUNTER — TELEPHONE (OUTPATIENT)
Dept: NEPHROLOGY | Facility: CLINIC | Age: 73
End: 2023-12-14
Payer: MEDICARE

## 2024-02-02 ENCOUNTER — HOSPITAL ENCOUNTER (EMERGENCY)
Facility: HOSPITAL | Age: 74
Discharge: ANOTHER HOSPITAL | End: 2024-02-03
Attending: FAMILY MEDICINE
Payer: MEDICARE

## 2024-02-02 DIAGNOSIS — I48.91 ATRIAL FIBRILLATION, UNSPECIFIED TYPE: ICD-10-CM

## 2024-02-02 DIAGNOSIS — R07.9 CHEST PAIN: ICD-10-CM

## 2024-02-02 DIAGNOSIS — I21.4 NSTEMI (NON-ST ELEVATED MYOCARDIAL INFARCTION): Primary | ICD-10-CM

## 2024-02-02 DIAGNOSIS — N19 RENAL FAILURE, UNSPECIFIED CHRONICITY: ICD-10-CM

## 2024-02-02 DIAGNOSIS — R60.9 SWELLING: ICD-10-CM

## 2024-02-02 LAB
ALBUMIN SERPL-MCNC: 3 G/DL (ref 3.4–4.8)
ALBUMIN/GLOB SERPL: 0.9 RATIO (ref 1.1–2)
ALP SERPL-CCNC: 79 UNIT/L (ref 40–150)
ALT SERPL-CCNC: 60 UNIT/L (ref 0–55)
APPEARANCE UR: CLEAR
AST SERPL-CCNC: 41 UNIT/L (ref 5–34)
BACTERIA #/AREA URNS AUTO: ABNORMAL /HPF
BASOPHILS # BLD AUTO: 0.02 X10(3)/MCL
BASOPHILS NFR BLD AUTO: 0.3 %
BILIRUB SERPL-MCNC: 0.8 MG/DL
BILIRUB UR QL STRIP.AUTO: NEGATIVE
BNP BLD-MCNC: 2138 PG/ML
BUN SERPL-MCNC: 61.3 MG/DL (ref 9.8–20.1)
CALCIUM SERPL-MCNC: 9.3 MG/DL (ref 8.4–10.2)
CHLORIDE SERPL-SCNC: 113 MMOL/L (ref 98–107)
CO2 SERPL-SCNC: 23 MMOL/L (ref 23–31)
COLOR UR AUTO: YELLOW
CREAT SERPL-MCNC: 4.08 MG/DL (ref 0.55–1.02)
EOSINOPHIL # BLD AUTO: 0.17 X10(3)/MCL (ref 0–0.9)
EOSINOPHIL NFR BLD AUTO: 2.2 %
ERYTHROCYTE [DISTWIDTH] IN BLOOD BY AUTOMATED COUNT: 15.6 % (ref 11.5–17)
FLUAV AG UPPER RESP QL IA.RAPID: NOT DETECTED
FLUBV AG UPPER RESP QL IA.RAPID: DETECTED
GFR SERPLBLD CREATININE-BSD FMLA CKD-EPI: 11 MLS/MIN/1.73/M2
GLOBULIN SER-MCNC: 3.4 GM/DL (ref 2.4–3.5)
GLUCOSE SERPL-MCNC: 110 MG/DL (ref 82–115)
GLUCOSE UR QL STRIP.AUTO: NEGATIVE
GRAN CASTS URNS QL MICRO: ABNORMAL /LPF
HCT VFR BLD AUTO: 43.5 % (ref 37–47)
HGB BLD-MCNC: 13.7 G/DL (ref 12–16)
HYALINE CASTS URNS QL MICRO: ABNORMAL /LPF
IMM GRANULOCYTES # BLD AUTO: 0.01 X10(3)/MCL (ref 0–0.04)
IMM GRANULOCYTES NFR BLD AUTO: 0.1 %
KETONES UR QL STRIP.AUTO: NEGATIVE
LEUKOCYTE ESTERASE UR QL STRIP.AUTO: NEGATIVE
LYMPHOCYTES # BLD AUTO: 2.71 X10(3)/MCL (ref 0.6–4.6)
LYMPHOCYTES NFR BLD AUTO: 34.4 %
MCH RBC QN AUTO: 28.8 PG (ref 27–31)
MCHC RBC AUTO-ENTMCNC: 31.5 G/DL (ref 33–36)
MCV RBC AUTO: 91.6 FL (ref 80–94)
MONOCYTES # BLD AUTO: 0.88 X10(3)/MCL (ref 0.1–1.3)
MONOCYTES NFR BLD AUTO: 11.2 %
NEUTROPHILS # BLD AUTO: 4.08 X10(3)/MCL (ref 2.1–9.2)
NEUTROPHILS NFR BLD AUTO: 51.8 %
NITRITE UR QL STRIP.AUTO: NEGATIVE
PH UR STRIP.AUTO: 6 [PH]
PLATELET # BLD AUTO: 209 X10(3)/MCL (ref 130–400)
PMV BLD AUTO: 10.5 FL (ref 7.4–10.4)
POC CARDIAC TROPONIN I: 0.12 NG/ML (ref 0–0.08)
POC CARDIAC TROPONIN I: 0.14 NG/ML (ref 0–0.08)
POCT GLUCOSE: 132 MG/DL (ref 70–110)
POTASSIUM SERPL-SCNC: 3.3 MMOL/L (ref 3.5–5.1)
PROT SERPL-MCNC: 6.4 GM/DL (ref 5.8–7.6)
PROT UR QL STRIP.AUTO: >=300
RBC # BLD AUTO: 4.75 X10(6)/MCL (ref 4.2–5.4)
RBC #/AREA URNS AUTO: ABNORMAL /HPF
RBC UR QL AUTO: ABNORMAL
RSV A 5' UTR RNA NPH QL NAA+PROBE: NOT DETECTED
SAMPLE: ABNORMAL
SAMPLE: ABNORMAL
SARS-COV-2 RNA RESP QL NAA+PROBE: NOT DETECTED
SODIUM SERPL-SCNC: 151 MMOL/L (ref 136–145)
SP GR UR STRIP.AUTO: 1.02 (ref 1–1.03)
SQUAMOUS #/AREA URNS AUTO: ABNORMAL /HPF
UROBILINOGEN UR STRIP-ACNC: 0.2
WBC # SPEC AUTO: 7.87 X10(3)/MCL (ref 4.5–11.5)
WBC #/AREA URNS AUTO: ABNORMAL /HPF
WBC CASTS URNS QL MICRO: ABNORMAL /LPF

## 2024-02-02 PROCEDURE — 81003 URINALYSIS AUTO W/O SCOPE: CPT | Performed by: FAMILY MEDICINE

## 2024-02-02 PROCEDURE — 96374 THER/PROPH/DIAG INJ IV PUSH: CPT

## 2024-02-02 PROCEDURE — 96375 TX/PRO/DX INJ NEW DRUG ADDON: CPT

## 2024-02-02 PROCEDURE — 80053 COMPREHEN METABOLIC PANEL: CPT | Performed by: FAMILY MEDICINE

## 2024-02-02 PROCEDURE — 25000003 PHARM REV CODE 250: Performed by: NURSE PRACTITIONER

## 2024-02-02 PROCEDURE — 82962 GLUCOSE BLOOD TEST: CPT

## 2024-02-02 PROCEDURE — 84484 ASSAY OF TROPONIN QUANT: CPT

## 2024-02-02 PROCEDURE — 63600175 PHARM REV CODE 636 W HCPCS: Performed by: FAMILY MEDICINE

## 2024-02-02 PROCEDURE — 83880 ASSAY OF NATRIURETIC PEPTIDE: CPT | Performed by: FAMILY MEDICINE

## 2024-02-02 PROCEDURE — 99285 EMERGENCY DEPT VISIT HI MDM: CPT | Mod: 25

## 2024-02-02 PROCEDURE — 85025 COMPLETE CBC W/AUTO DIFF WBC: CPT | Performed by: FAMILY MEDICINE

## 2024-02-02 PROCEDURE — 25000003 PHARM REV CODE 250: Performed by: FAMILY MEDICINE

## 2024-02-02 PROCEDURE — 0241U COVID/RSV/FLU A&B PCR: CPT | Performed by: FAMILY MEDICINE

## 2024-02-02 RX ORDER — FUROSEMIDE 10 MG/ML
80 INJECTION INTRAMUSCULAR; INTRAVENOUS
Status: COMPLETED | OUTPATIENT
Start: 2024-02-02 | End: 2024-02-02

## 2024-02-02 RX ORDER — HYDRALAZINE HYDROCHLORIDE 20 MG/ML
20 INJECTION INTRAMUSCULAR; INTRAVENOUS
Status: COMPLETED | OUTPATIENT
Start: 2024-02-02 | End: 2024-02-02

## 2024-02-02 RX ORDER — ENOXAPARIN SODIUM 100 MG/ML
100 INJECTION SUBCUTANEOUS
Status: DISCONTINUED | OUTPATIENT
Start: 2024-02-02 | End: 2024-02-02

## 2024-02-02 RX ORDER — ENOXAPARIN SODIUM 100 MG/ML
1 INJECTION SUBCUTANEOUS ONCE
Status: DISCONTINUED | OUTPATIENT
Start: 2024-02-03 | End: 2024-02-02

## 2024-02-02 RX ORDER — POTASSIUM CHLORIDE 20 MEQ/1
20 TABLET, EXTENDED RELEASE ORAL
Status: COMPLETED | OUTPATIENT
Start: 2024-02-02 | End: 2024-02-03

## 2024-02-02 RX ORDER — METOPROLOL TARTRATE 1 MG/ML
5 INJECTION, SOLUTION INTRAVENOUS
Status: COMPLETED | OUTPATIENT
Start: 2024-02-02 | End: 2024-02-02

## 2024-02-02 RX ADMIN — METOPROLOL TARTRATE 5 MG: 1 INJECTION, SOLUTION INTRAVENOUS at 10:02

## 2024-02-02 RX ADMIN — HYDRALAZINE HYDROCHLORIDE 20 MG: 20 INJECTION INTRAMUSCULAR; INTRAVENOUS at 09:02

## 2024-02-02 RX ADMIN — CLONIDINE HYDROCHLORIDE 0.3 MG: 0.2 TABLET ORAL at 08:02

## 2024-02-02 RX ADMIN — DILTIAZEM HYDROCHLORIDE 5 MG/HR: 5 INJECTION INTRAVENOUS at 11:02

## 2024-02-02 RX ADMIN — FUROSEMIDE 80 MG: 10 INJECTION, SOLUTION INTRAMUSCULAR; INTRAVENOUS at 08:02

## 2024-02-02 RX ADMIN — POTASSIUM CHLORIDE 20 MEQ: 1500 TABLET, EXTENDED RELEASE ORAL at 11:02

## 2024-02-03 ENCOUNTER — HOSPITAL ENCOUNTER (INPATIENT)
Facility: HOSPITAL | Age: 74
LOS: 2 days | Discharge: HOME HEALTH | DRG: 280 | End: 2024-02-06
Attending: EMERGENCY MEDICINE | Admitting: HOSPITALIST
Payer: MEDICARE

## 2024-02-03 VITALS
HEIGHT: 63 IN | RESPIRATION RATE: 18 BRPM | OXYGEN SATURATION: 99 % | HEART RATE: 91 BPM | TEMPERATURE: 98 F | SYSTOLIC BLOOD PRESSURE: 142 MMHG | DIASTOLIC BLOOD PRESSURE: 97 MMHG | WEIGHT: 195 LBS | BODY MASS INDEX: 34.55 KG/M2

## 2024-02-03 DIAGNOSIS — R06.02 SOB (SHORTNESS OF BREATH): ICD-10-CM

## 2024-02-03 DIAGNOSIS — N25.81 SECONDARY HYPERPARATHYROIDISM OF RENAL ORIGIN: ICD-10-CM

## 2024-02-03 DIAGNOSIS — I49.8 SINUS ARRHYTHMIA: ICD-10-CM

## 2024-02-03 DIAGNOSIS — E66.01 CLASS 2 SEVERE OBESITY DUE TO EXCESS CALORIES WITH SERIOUS COMORBIDITY AND BODY MASS INDEX (BMI) OF 35.0 TO 35.9 IN ADULT: ICD-10-CM

## 2024-02-03 DIAGNOSIS — R06.02 SHORTNESS OF BREATH: ICD-10-CM

## 2024-02-03 DIAGNOSIS — J10.1 INFLUENZA B: ICD-10-CM

## 2024-02-03 DIAGNOSIS — I35.1 MILD AORTIC REGURGITATION: ICD-10-CM

## 2024-02-03 DIAGNOSIS — E04.1 CYST OF THYROID: ICD-10-CM

## 2024-02-03 DIAGNOSIS — I34.0 MITRAL VALVE INSUFFICIENCY, UNSPECIFIED ETIOLOGY: ICD-10-CM

## 2024-02-03 DIAGNOSIS — I10 PRIMARY HYPERTENSION: ICD-10-CM

## 2024-02-03 DIAGNOSIS — R79.89 ELEVATED SERUM CREATININE: ICD-10-CM

## 2024-02-03 DIAGNOSIS — E87.6 HYPOKALEMIA: ICD-10-CM

## 2024-02-03 DIAGNOSIS — R03.0 ELEVATED BLOOD PRESSURE READING: ICD-10-CM

## 2024-02-03 DIAGNOSIS — N18.4 CKD (CHRONIC KIDNEY DISEASE) STAGE 4, GFR 15-29 ML/MIN: ICD-10-CM

## 2024-02-03 DIAGNOSIS — R79.89 ELEVATED TROPONIN: Primary | ICD-10-CM

## 2024-02-03 DIAGNOSIS — R80.1 PERSISTENT PROTEINURIA: ICD-10-CM

## 2024-02-03 LAB
ALBUMIN SERPL-MCNC: 3.2 G/DL (ref 3.4–4.8)
ALBUMIN/GLOB SERPL: 1.2 RATIO (ref 1.1–2)
ALP SERPL-CCNC: 70 UNIT/L (ref 40–150)
ALT SERPL-CCNC: 58 UNIT/L (ref 0–55)
AST SERPL-CCNC: 40 UNIT/L (ref 5–34)
AV INDEX (PROSTH): 0.56
AV MEAN GRADIENT: 8 MMHG
AV PEAK GRADIENT: 13 MMHG
AV REGURGITATION PRESSURE HALF TIME: 567 MS
AV VALVE AREA BY VELOCITY RATIO: 1.61 CM²
AV VALVE AREA: 1.76 CM²
AV VELOCITY RATIO: 0.51
BASOPHILS # BLD AUTO: 0.03 X10(3)/MCL
BASOPHILS NFR BLD AUTO: 0.5 %
BILIRUB SERPL-MCNC: 0.8 MG/DL
BSA FOR ECHO PROCEDURE: 1.96 M2
BUN SERPL-MCNC: 62.4 MG/DL (ref 9.8–20.1)
CALCIUM SERPL-MCNC: 9.4 MG/DL (ref 8.4–10.2)
CHLORIDE SERPL-SCNC: 114 MMOL/L (ref 98–107)
CHOLEST SERPL-MCNC: 171 MG/DL
CHOLEST/HDLC SERPL: 2 {RATIO} (ref 0–5)
CO2 SERPL-SCNC: 22 MMOL/L (ref 23–31)
CREAT SERPL-MCNC: 4.31 MG/DL (ref 0.55–1.02)
CV ECHO LV RWT: 0.68 CM
DOP CALC AO PEAK VEL: 1.83 M/S
DOP CALC AO VTI: 28.9 CM
DOP CALC LVOT AREA: 3.1 CM2
DOP CALC LVOT DIAMETER: 2 CM
DOP CALC LVOT PEAK VEL: 0.94 M/S
DOP CALC LVOT STROKE VOLUME: 50.87 CM3
DOP CALC MV VTI: 25.6 CM
DOP CALCLVOT PEAK VEL VTI: 16.2 CM
E/A RATIO: 0.89
E/E' RATIO: 10.11 M/S
ECHO LV POSTERIOR WALL: 1.52 CM (ref 0.6–1.1)
EOSINOPHIL # BLD AUTO: 0.07 X10(3)/MCL (ref 0–0.9)
EOSINOPHIL NFR BLD AUTO: 1.2 %
ERYTHROCYTE [DISTWIDTH] IN BLOOD BY AUTOMATED COUNT: 15.9 % (ref 11.5–17)
EST. AVERAGE GLUCOSE BLD GHB EST-MCNC: 105.4 MG/DL
FRACTIONAL SHORTENING: 20 % (ref 28–44)
GFR SERPLBLD CREATININE-BSD FMLA CKD-EPI: 10 MLS/MIN/1.73/M2
GLOBULIN SER-MCNC: 2.7 GM/DL (ref 2.4–3.5)
GLUCOSE SERPL-MCNC: 125 MG/DL (ref 82–115)
HBA1C MFR BLD: 5.3 %
HCT VFR BLD AUTO: 40 % (ref 37–47)
HDLC SERPL-MCNC: 71 MG/DL (ref 35–60)
HGB BLD-MCNC: 13.2 G/DL (ref 12–16)
IMM GRANULOCYTES # BLD AUTO: 0.03 X10(3)/MCL (ref 0–0.04)
IMM GRANULOCYTES NFR BLD AUTO: 0.5 %
INTERVENTRICULAR SEPTUM: 1.49 CM (ref 0.6–1.1)
LDLC SERPL CALC-MCNC: 91 MG/DL (ref 50–140)
LEFT ATRIUM SIZE: 5.1 CM
LEFT ATRIUM VOLUME INDEX MOD: 64 ML/M2
LEFT ATRIUM VOLUME MOD: 121 CM3
LEFT INTERNAL DIMENSION IN SYSTOLE: 3.59 CM (ref 2.1–4)
LEFT VENTRICLE DIASTOLIC VOLUME INDEX: 48.68 ML/M2
LEFT VENTRICLE DIASTOLIC VOLUME: 92 ML
LEFT VENTRICLE MASS INDEX: 146 G/M2
LEFT VENTRICLE SYSTOLIC VOLUME INDEX: 28.6 ML/M2
LEFT VENTRICLE SYSTOLIC VOLUME: 54.1 ML
LEFT VENTRICULAR INTERNAL DIMENSION IN DIASTOLE: 4.49 CM (ref 3.5–6)
LEFT VENTRICULAR MASS: 276.29 G
LV LATERAL E/E' RATIO: 8.27 M/S
LV SEPTAL E/E' RATIO: 13 M/S
LVOT MG: 2 MMHG
LVOT MV: 0.7 CM/S
LYMPHOCYTES # BLD AUTO: 1.29 X10(3)/MCL (ref 0.6–4.6)
LYMPHOCYTES NFR BLD AUTO: 21.6 %
MCH RBC QN AUTO: 29.7 PG (ref 27–31)
MCHC RBC AUTO-ENTMCNC: 33 G/DL (ref 33–36)
MCV RBC AUTO: 90.1 FL (ref 80–94)
MONOCYTES # BLD AUTO: 0.54 X10(3)/MCL (ref 0.1–1.3)
MONOCYTES NFR BLD AUTO: 9 %
MR PISA EROA: 0.1 CM2
MV MEAN GRADIENT: 2 MMHG
MV PEAK A VEL: 1.02 M/S
MV PEAK E VEL: 0.91 M/S
MV PEAK GRADIENT: 4 MMHG
MV STENOSIS PRESSURE HALF TIME: 87 MS
MV VALVE AREA BY CONTINUITY EQUATION: 1.99 CM2
MV VALVE AREA P 1/2 METHOD: 2.53 CM2
NEUTROPHILS # BLD AUTO: 4.02 X10(3)/MCL (ref 2.1–9.2)
NEUTROPHILS NFR BLD AUTO: 67.2 %
NRBC BLD AUTO-RTO: 0 %
OHS LV EJECTION FRACTION SIMPSONS BIPLANE MOD: 47 %
PISA AR MAX VEL: 4.89 M/S
PISA MRMAX VEL: 6.11 M/S
PISA RADIUS: 0.5 CM
PISA TR MAX VEL: 2.3 M/S
PISA VN NYQUIST MS: 0.39 M/S
PISA VN NYQUIST: 0.39 M/S
PLATELET # BLD AUTO: 190 X10(3)/MCL (ref 130–400)
PMV BLD AUTO: 10.5 FL (ref 7.4–10.4)
POTASSIUM SERPL-SCNC: 3.7 MMOL/L (ref 3.5–5.1)
PROT SERPL-MCNC: 5.9 GM/DL (ref 5.8–7.6)
PV PEAK GRADIENT: 4 MMHG
PV PEAK VELOCITY: 1.01 M/S
RA PRESSURE ESTIMATED: 8 MMHG
RBC # BLD AUTO: 4.44 X10(6)/MCL (ref 4.2–5.4)
RV TB RVSP: 10 MMHG
SODIUM SERPL-SCNC: 149 MMOL/L (ref 136–145)
TDI LATERAL: 0.11 M/S
TDI SEPTAL: 0.07 M/S
TDI: 0.09 M/S
TR MAX PG: 21 MMHG
TRICUSPID ANNULAR PLANE SYSTOLIC EXCURSION: 1.88 CM
TRIGL SERPL-MCNC: 47 MG/DL (ref 37–140)
TROPONIN I SERPL-MCNC: 0.21 NG/ML (ref 0–0.04)
TROPONIN I SERPL-MCNC: 0.3 NG/ML (ref 0–0.04)
TV REST PULMONARY ARTERY PRESSURE: 29 MMHG
VLDLC SERPL CALC-MCNC: 9 MG/DL
WBC # SPEC AUTO: 5.98 X10(3)/MCL (ref 4.5–11.5)
Z-SCORE OF LEFT VENTRICULAR DIMENSION IN END DIASTOLE: -1.64
Z-SCORE OF LEFT VENTRICULAR DIMENSION IN END SYSTOLE: 0.76

## 2024-02-03 PROCEDURE — G0378 HOSPITAL OBSERVATION PER HR: HCPCS

## 2024-02-03 PROCEDURE — 96375 TX/PRO/DX INJ NEW DRUG ADDON: CPT

## 2024-02-03 PROCEDURE — 63600175 PHARM REV CODE 636 W HCPCS: Performed by: HOSPITALIST

## 2024-02-03 PROCEDURE — 93010 ELECTROCARDIOGRAM REPORT: CPT | Mod: ,,, | Performed by: INTERNAL MEDICINE

## 2024-02-03 PROCEDURE — 63600175 PHARM REV CODE 636 W HCPCS: Performed by: INTERNAL MEDICINE

## 2024-02-03 PROCEDURE — 63600175 PHARM REV CODE 636 W HCPCS: Performed by: FAMILY MEDICINE

## 2024-02-03 PROCEDURE — 99285 EMERGENCY DEPT VISIT HI MDM: CPT | Mod: 25

## 2024-02-03 PROCEDURE — 93005 ELECTROCARDIOGRAM TRACING: CPT

## 2024-02-03 PROCEDURE — 80053 COMPREHEN METABOLIC PANEL: CPT | Performed by: INTERNAL MEDICINE

## 2024-02-03 PROCEDURE — 84484 ASSAY OF TROPONIN QUANT: CPT | Performed by: INTERNAL MEDICINE

## 2024-02-03 PROCEDURE — 85025 COMPLETE CBC W/AUTO DIFF WBC: CPT | Performed by: INTERNAL MEDICINE

## 2024-02-03 PROCEDURE — 25000003 PHARM REV CODE 250: Performed by: HOSPITALIST

## 2024-02-03 PROCEDURE — 25000003 PHARM REV CODE 250: Performed by: NURSE PRACTITIONER

## 2024-02-03 PROCEDURE — 96374 THER/PROPH/DIAG INJ IV PUSH: CPT

## 2024-02-03 PROCEDURE — 80061 LIPID PANEL: CPT | Performed by: NURSE PRACTITIONER

## 2024-02-03 PROCEDURE — 25000003 PHARM REV CODE 250

## 2024-02-03 PROCEDURE — 96372 THER/PROPH/DIAG INJ SC/IM: CPT | Performed by: FAMILY MEDICINE

## 2024-02-03 PROCEDURE — 83036 HEMOGLOBIN GLYCOSYLATED A1C: CPT | Performed by: NURSE PRACTITIONER

## 2024-02-03 PROCEDURE — 96376 TX/PRO/DX INJ SAME DRUG ADON: CPT

## 2024-02-03 RX ORDER — POTASSIUM CHLORIDE 20 MEQ/1
20 TABLET, EXTENDED RELEASE ORAL DAILY
Status: DISCONTINUED | OUTPATIENT
Start: 2024-02-03 | End: 2024-02-06 | Stop reason: HOSPADM

## 2024-02-03 RX ORDER — ENOXAPARIN SODIUM 100 MG/ML
100 INJECTION SUBCUTANEOUS
Status: COMPLETED | OUTPATIENT
Start: 2024-02-03 | End: 2024-02-03

## 2024-02-03 RX ORDER — FUROSEMIDE 10 MG/ML
40 INJECTION INTRAMUSCULAR; INTRAVENOUS EVERY 12 HOURS
Status: DISCONTINUED | OUTPATIENT
Start: 2024-02-03 | End: 2024-02-06 | Stop reason: HOSPADM

## 2024-02-03 RX ORDER — HYDRALAZINE HYDROCHLORIDE 20 MG/ML
10 INJECTION INTRAMUSCULAR; INTRAVENOUS
Status: DISCONTINUED | OUTPATIENT
Start: 2024-02-03 | End: 2024-02-06 | Stop reason: HOSPADM

## 2024-02-03 RX ORDER — SODIUM CHLORIDE 0.9 % (FLUSH) 0.9 %
10 SYRINGE (ML) INJECTION
Status: DISCONTINUED | OUTPATIENT
Start: 2024-02-03 | End: 2024-02-06 | Stop reason: HOSPADM

## 2024-02-03 RX ORDER — OSELTAMIVIR PHOSPHATE 30 MG/1
30 CAPSULE ORAL DAILY
Status: DISCONTINUED | OUTPATIENT
Start: 2024-02-03 | End: 2024-02-06 | Stop reason: HOSPADM

## 2024-02-03 RX ORDER — TALC
6 POWDER (GRAM) TOPICAL NIGHTLY PRN
Status: DISCONTINUED | OUTPATIENT
Start: 2024-02-03 | End: 2024-02-06 | Stop reason: HOSPADM

## 2024-02-03 RX ORDER — HYDRALAZINE HYDROCHLORIDE 50 MG/1
50 TABLET, FILM COATED ORAL 3 TIMES DAILY
Status: DISCONTINUED | OUTPATIENT
Start: 2024-02-03 | End: 2024-02-06 | Stop reason: HOSPADM

## 2024-02-03 RX ORDER — NAPROXEN SODIUM 220 MG/1
81 TABLET, FILM COATED ORAL DAILY
Status: DISCONTINUED | OUTPATIENT
Start: 2024-02-03 | End: 2024-02-06 | Stop reason: HOSPADM

## 2024-02-03 RX ORDER — CALCITRIOL 0.25 UG/1
0.25 CAPSULE ORAL DAILY
Status: DISCONTINUED | OUTPATIENT
Start: 2024-02-03 | End: 2024-02-06 | Stop reason: HOSPADM

## 2024-02-03 RX ORDER — SPIRONOLACTONE 25 MG/1
25 TABLET ORAL DAILY
Status: DISCONTINUED | OUTPATIENT
Start: 2024-02-03 | End: 2024-02-06 | Stop reason: HOSPADM

## 2024-02-03 RX ORDER — METOPROLOL SUCCINATE 25 MG/1
25 TABLET, EXTENDED RELEASE ORAL 2 TIMES DAILY
Status: DISCONTINUED | OUTPATIENT
Start: 2024-02-03 | End: 2024-02-06 | Stop reason: HOSPADM

## 2024-02-03 RX ORDER — NIFEDIPINE 60 MG/1
60 TABLET, EXTENDED RELEASE ORAL 2 TIMES DAILY
Status: DISCONTINUED | OUTPATIENT
Start: 2024-02-03 | End: 2024-02-06 | Stop reason: HOSPADM

## 2024-02-03 RX ORDER — CLONIDINE HYDROCHLORIDE 0.1 MG/1
0.1 TABLET ORAL EVERY 6 HOURS PRN
Status: DISCONTINUED | OUTPATIENT
Start: 2024-02-03 | End: 2024-02-06 | Stop reason: HOSPADM

## 2024-02-03 RX ADMIN — METOPROLOL SUCCINATE 25 MG: 25 TABLET, EXTENDED RELEASE ORAL at 09:02

## 2024-02-03 RX ADMIN — FUROSEMIDE 40 MG: 10 INJECTION, SOLUTION INTRAMUSCULAR; INTRAVENOUS at 11:02

## 2024-02-03 RX ADMIN — SPIRONOLACTONE 25 MG: 25 TABLET ORAL at 08:02

## 2024-02-03 RX ADMIN — POTASSIUM CHLORIDE 20 MEQ: 1500 TABLET, EXTENDED RELEASE ORAL at 05:02

## 2024-02-03 RX ADMIN — CALCITRIOL CAPSULES 0.25 MCG 0.25 MCG: 0.25 CAPSULE ORAL at 08:02

## 2024-02-03 RX ADMIN — HYDRALAZINE HYDROCHLORIDE 10 MG: 20 INJECTION INTRAMUSCULAR; INTRAVENOUS at 07:02

## 2024-02-03 RX ADMIN — OSELTAMIVIR PHOSPHATE 30 MG: 30 CAPSULE ORAL at 08:02

## 2024-02-03 RX ADMIN — HYDRALAZINE HYDROCHLORIDE 50 MG: 50 TABLET ORAL at 09:02

## 2024-02-03 RX ADMIN — ENOXAPARIN SODIUM 100 MG: 100 INJECTION SUBCUTANEOUS at 01:02

## 2024-02-03 RX ADMIN — FUROSEMIDE 40 MG: 10 INJECTION, SOLUTION INTRAMUSCULAR; INTRAVENOUS at 08:02

## 2024-02-03 RX ADMIN — ASPIRIN 81 MG CHEWABLE TABLET 81 MG: 81 TABLET CHEWABLE at 08:02

## 2024-02-03 RX ADMIN — NIFEDIPINE 60 MG: 60 TABLET, FILM COATED, EXTENDED RELEASE ORAL at 09:02

## 2024-02-03 RX ADMIN — DILTIAZEM HYDROCHLORIDE: 5 INJECTION INTRAVENOUS at 02:02

## 2024-02-03 RX ADMIN — HYDRALAZINE HYDROCHLORIDE 50 MG: 50 TABLET ORAL at 02:02

## 2024-02-03 RX ADMIN — POTASSIUM CHLORIDE 20 MEQ: 1500 TABLET, EXTENDED RELEASE ORAL at 01:02

## 2024-02-03 RX ADMIN — METOPROLOL SUCCINATE 25 MG: 25 TABLET, EXTENDED RELEASE ORAL at 08:02

## 2024-02-03 RX ADMIN — NIFEDIPINE 60 MG: 60 TABLET, FILM COATED, EXTENDED RELEASE ORAL at 08:02

## 2024-02-03 NOTE — ED NOTES
Pt accepted to [facility] ER per Dr Green. Report to Kendell BELTRE. [facility] notified of transfer.

## 2024-02-03 NOTE — NURSING
Nurses Note -- 4 Eyes      2/3/2024   5:17 PM      Skin assessed during: Admit      [x] No Altered Skin Integrity Present    []Prevention Measures Documented      [] Yes- Altered Skin Integrity Present or Discovered   [] LDA Added if Not in Epic (Describe Wound)   [] New Altered Skin Integrity was Present on Admit and Documented in LDA   [] Wound Image Taken    Wound Care Consulted? No    Attending Nurse:  Prabhjot Salazar RN     Second RN/Staff Member:   Kate Angulo RN

## 2024-02-03 NOTE — CONSULTS
Inpatient consult to Cardiology  Consult performed by: Amy Gonzalez FNP  Consult ordered by: Clarke Ely MD      [facility] - Emergency Dept    Cardiology  Consult Note    Patient Name: Jesi Cha  MRN: [id number]  Admission Date: 2/3/2024  Hospital Length of Stay: 0 days  Code Status: Full Code   Attending Provider: Clarke Ely MD   Consulting Provider: MYRTLE Carranza  Primary Care Physician: Dk Rodriguez MD  Principal Problem:Elevated blood pressure reading    Patient information was obtained from patient, past medical records, and ER records.     Subjective:     Reason for consult: NSTEMI, afib    HPI:   Ms. Cha is a 72 y/o female, known to [facility] through telecardiology consult only, who presented to [place] ER with c/o SOB and peripheral edema. /150 on arrival. She reports she stopped taking her medications.  Lab significant for BUN/creatinine 61.3/4.08, AST/ALT 40/58, BNP 2138, troponin 0.299, Influenza B+. Negative COVID. CXR consistent with pulmonary edema. CT head negative for acute intracranial abnormality. EKG revealing SR with inferior and anterolateral T wave inversions. She was treated with   A cardizem drip and transferred to [facility] for admit to hospital medicine. [facility] has been consulted for new Afib, NSTEMI. No evidence of Afib scanned to chart.     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  Moderate concentric LVH.  AV sclerosis without stenosis.  Mild Aortic regurgitation.  Moderate posteriorly directed jet of mitral regurgitation. If clinically indicated, this can be further assessed and quantified by ANGELIQUE  and/or Cardiac MRI.    Past Medical History:   Diagnosis Date    HTN (hypertension)     Hypokalemia     Obesity,  unspecified     Thyroid cyst        Past Surgical History:   Procedure Laterality Date    APPENDECTOMY       SECTION      CHOLECYSTECTOMY         Review of patient's allergies indicates:   Allergen Reactions    Codeine        Current Facility-Administered Medications on File Prior to Encounter   Medication    [COMPLETED] cloNIDine tablet 0.3 mg    [COMPLETED] enoxaparin injection 100 mg    [COMPLETED] furosemide injection 80 mg    [COMPLETED] hydrALAZINE injection 20 mg    [COMPLETED] metoprolol injection 5 mg    [COMPLETED] potassium chloride SA CR tablet 20 mEq    [DISCONTINUED] diltiaZEM (CARDIZEM) 125 mg in dextrose 5 % (D5W) 125 mL infusion    [DISCONTINUED] enoxaparin injection 100 mg    [DISCONTINUED] enoxaparin injection 90 mg     Current Outpatient Medications on File Prior to Encounter   Medication Sig    aspirin 81 mg Cap Take 81 mg by mouth once daily at 6am.    calcitRIOL (ROCALTROL) 0.25 MCG Cap Take 1 capsule (0.25 mcg total) by mouth once daily.    hydrALAZINE (APRESOLINE) 50 MG tablet Take 1 tablet (50 mg total) by mouth 3 (three) times daily.    losartan (COZAAR) 50 MG tablet Take 1 tablet (50 mg total) by mouth once daily.    metoprolol succinate (TOPROL-XL) 25 MG 24 hr tablet Take 1 tablet (25 mg total) by mouth 2 (two) times daily.    NIFEdipine (PROCARDIA-XL) 60 MG (OSM) 24 hr tablet Take 1 tablet (60 mg total) by mouth 2 (two) times a day.    nitroGLYCERIN (NITROSTAT) 0.4 MG SL tablet Place 0.4 mg under the tongue.    potassium chloride SA (K-DUR,KLOR-CON) 20 MEQ tablet Take 20 mEq by mouth Daily.    spironolactone (ALDACTONE) 25 MG tablet Take 1 tablet (25 mg total) by mouth once daily.     Family History       Problem Relation (Age of Onset)    Arthritis Mother          Tobacco Use    Smoking status: Never     Passive exposure: Never    Smokeless tobacco: Never   Substance and Sexual Activity    Alcohol use: Never    Drug use: Never    Sexual activity: Yes       Review of Systems    Unable to perform ROS: Mental status change       Objective:     Vital Signs (Most Recent):  Temp: 98.4 °F (36.9 °C) (02/03/24 0211)  Pulse: 86 (02/03/24 0701)  Resp: 19 (02/03/24 0701)  BP: (!) 161/111 (02/03/24 0701)  SpO2: 98 % (02/03/24 0701) Vital Signs (24h Range):  Temp:  [98.1 °F (36.7 °C)-98.4 °F (36.9 °C)] 98.4 °F (36.9 °C)  Pulse:  [] 86  Resp:  [8-34] 19  SpO2:  [90 %-100 %] 98 %  BP: (118-227)/() 161/111     Weight: 86.2 kg (190 lb)  Body mass index is 33.66 kg/m².    SpO2: 98 %       No intake or output data in the 24 hours ending 02/03/24 0715    Lines/Drains/Airways       None                   Significant Labs:  Recent Results (from the past 72 hour(s))   Troponin ISTAT    Collection Time: 02/02/24  8:35 PM   Result Value Ref Range    POC Cardiac Troponin I 0.12 (H) 0.00 - 0.08 ng/mL    Sample unknown    BNP    Collection Time: 02/02/24  8:36 PM   Result Value Ref Range    Natriuretic Peptide 2,138.0 (H) <=100.0 pg/mL   CBC with Differential    Collection Time: 02/02/24  8:36 PM   Result Value Ref Range    WBC 7.87 4.50 - 11.50 x10(3)/mcL    RBC 4.75 4.20 - 5.40 x10(6)/mcL    Hgb 13.7 12.0 - 16.0 g/dL    Hct 43.5 37.0 - 47.0 %    MCV 91.6 80.0 - 94.0 fL    MCH 28.8 27.0 - 31.0 pg    MCHC 31.5 (L) 33.0 - 36.0 g/dL    RDW 15.6 11.5 - 17.0 %    Platelet 209 130 - 400 x10(3)/mcL    MPV 10.5 (H) 7.4 - 10.4 fL    Neut % 51.8 %    Lymph % 34.4 %    Mono % 11.2 %    Eos % 2.2 %    Basophil % 0.3 %    Lymph # 2.71 0.6 - 4.6 x10(3)/mcL    Neut # 4.08 2.1 - 9.2 x10(3)/mcL    Mono # 0.88 0.1 - 1.3 x10(3)/mcL    Eos # 0.17 0 - 0.9 x10(3)/mcL    Baso # 0.02 <=0.2 x10(3)/mcL    IG# 0.01 0 - 0.04 x10(3)/mcL    IG% 0.1 %   COVID/RSV/FLU A&B PCR    Collection Time: 02/02/24  8:54 PM   Result Value Ref Range    Influenza A PCR Not Detected Not Detected    Influenza B PCR Detected (A) Not Detected    Respiratory Syncytial Virus PCR Not Detected Not Detected    SARS-CoV-2 PCR Not Detected Not Detected,  Negative   Comprehensive metabolic panel    Collection Time: 02/02/24  9:09 PM   Result Value Ref Range    Sodium Level 151 (H) 136 - 145 mmol/L    Potassium Level 3.3 (L) 3.5 - 5.1 mmol/L    Chloride 113 (H) 98 - 107 mmol/L    Carbon Dioxide 23 23 - 31 mmol/L    Glucose Level 110 82 - 115 mg/dL    Blood Urea Nitrogen 61.3 (H) 9.8 - 20.1 mg/dL    Creatinine 4.08 (H) 0.55 - 1.02 mg/dL    Calcium Level Total 9.3 8.4 - 10.2 mg/dL    Protein Total 6.4 5.8 - 7.6 gm/dL    Albumin Level 3.0 (L) 3.4 - 4.8 g/dL    Globulin 3.4 2.4 - 3.5 gm/dL    Albumin/Globulin Ratio 0.9 (L) 1.1 - 2.0 ratio    Bilirubin Total 0.8 <=1.5 mg/dL    Alkaline Phosphatase 79 40 - 150 unit/L    Alanine Aminotransferase 60 (H) 0 - 55 unit/L    Aspartate Aminotransferase 41 (H) 5 - 34 unit/L    eGFR 11 mls/min/1.73/m2   Troponin ISTAT    Collection Time: 02/02/24  9:12 PM   Result Value Ref Range    POC Cardiac Troponin I 0.14 (H) 0.00 - 0.08 ng/mL    Sample unknown    Urinalysis, Reflex to Urine Culture    Collection Time: 02/02/24  9:29 PM    Specimen: Urine   Result Value Ref Range    Color, UA Yellow Yellow, Light-Yellow, Dark Yellow, Zoila, Straw    Appearance, UA Clear Clear    Specific Gravity, UA 1.025 1.005 - 1.030    pH, UA 6.0 5.0 - 8.5    Protein, UA >=300 (A) Negative    Glucose, UA Negative Negative, Normal    Ketones, UA Negative Negative    Blood, UA Small (A) Negative    Bilirubin, UA Negative Negative    Urobilinogen, UA 0.2 0.2, 1.0, Normal    Nitrites, UA Negative Negative    Leukocyte Esterase, UA Negative Negative   Urinalysis, Microscopic    Collection Time: 02/02/24  9:29 PM   Result Value Ref Range    Bacteria, UA None Seen None Seen, Rare, Occasional /HPF    Hyaline Casts, UA Few (A) None Seen /LPF    Granular Casts, UA Occ (A) None Seen /LPF    WBC Casts, UA Occ (A) None Seen /LPF    RBC, UA 0-2 None Seen, 0-2, 3-5, 0-5 /HPF    WBC, UA 0-2 None Seen, 0-2, 3-5, 0-5 /HPF    Squamous Epithelial Cells, UA Occ None Seen, Rare,  Occasional, Occ /HPF   POCT glucose    Collection Time: 02/02/24 11:40 PM   Result Value Ref Range    POCT Glucose 132 (H) 70 - 110 mg/dL   Troponin I    Collection Time: 02/03/24  5:28 AM   Result Value Ref Range    Troponin-I 0.299 (H) 0.000 - 0.045 ng/mL   Comprehensive metabolic panel    Collection Time: 02/03/24  5:28 AM   Result Value Ref Range    Sodium Level 149 (H) 136 - 145 mmol/L    Potassium Level 3.7 3.5 - 5.1 mmol/L    Chloride 114 (H) 98 - 107 mmol/L    Carbon Dioxide 22 (L) 23 - 31 mmol/L    Glucose Level 125 (H) 82 - 115 mg/dL    Blood Urea Nitrogen 62.4 (H) 9.8 - 20.1 mg/dL    Creatinine 4.31 (H) 0.55 - 1.02 mg/dL    Calcium Level Total 9.4 8.4 - 10.2 mg/dL    Protein Total 5.9 5.8 - 7.6 gm/dL    Albumin Level 3.2 (L) 3.4 - 4.8 g/dL    Globulin 2.7 2.4 - 3.5 gm/dL    Albumin/Globulin Ratio 1.2 1.1 - 2.0 ratio    Bilirubin Total 0.8 <=1.5 mg/dL    Alkaline Phosphatase 70 40 - 150 unit/L    Alanine Aminotransferase 58 (H) 0 - 55 unit/L    Aspartate Aminotransferase 40 (H) 5 - 34 unit/L    eGFR 10 mls/min/1.73/m2   CBC with Differential    Collection Time: 02/03/24  5:28 AM   Result Value Ref Range    WBC 5.98 4.50 - 11.50 x10(3)/mcL    RBC 4.44 4.20 - 5.40 x10(6)/mcL    Hgb 13.2 12.0 - 16.0 g/dL    Hct 40.0 37.0 - 47.0 %    MCV 90.1 80.0 - 94.0 fL    MCH 29.7 27.0 - 31.0 pg    MCHC 33.0 33.0 - 36.0 g/dL    RDW 15.9 11.5 - 17.0 %    Platelet 190 130 - 400 x10(3)/mcL    MPV 10.5 (H) 7.4 - 10.4 fL    Neut % 67.2 %    Lymph % 21.6 %    Mono % 9.0 %    Eos % 1.2 %    Basophil % 0.5 %    Lymph # 1.29 0.6 - 4.6 x10(3)/mcL    Neut # 4.02 2.1 - 9.2 x10(3)/mcL    Mono # 0.54 0.1 - 1.3 x10(3)/mcL    Eos # 0.07 0 - 0.9 x10(3)/mcL    Baso # 0.03 <=0.2 x10(3)/mcL    IG# 0.03 0 - 0.04 x10(3)/mcL    IG% 0.5 %    NRBC% 0.0 %       Significant Imaging:  Imaging Results              US Retroperitoneal Complete (In process)                     EKG:        Physical Exam  HENT:      Mouth/Throat:      Mouth: Mucous  membranes are moist.   Cardiovascular:      Rate and Rhythm: Normal rate and regular rhythm.      Pulses: Normal pulses.      Heart sounds: Murmur heard.   Pulmonary:      Breath sounds: Rales present.   Abdominal:      Palpations: Abdomen is soft.   Musculoskeletal:         General: Normal range of motion.   Skin:     General: Skin is warm.      Capillary Refill: Capillary refill takes less than 2 seconds.   Neurological:      Mental Status: She is alert. She is disoriented.         Home Medications:   Current Facility-Administered Medications on File Prior to Encounter   Medication Dose Route Frequency Provider Last Rate Last Admin    [COMPLETED] cloNIDine tablet 0.3 mg  0.3 mg Oral ED 1 Time Kendell Valente MD   0.3 mg at 02/02/24 2042    [COMPLETED] enoxaparin injection 100 mg  100 mg Subcutaneous ED 1 Time Kendell Valente MD   100 mg at 02/03/24 0102    [COMPLETED] furosemide injection 80 mg  80 mg Intravenous ED 1 Time Kendell Valente MD   80 mg at 02/02/24 2043    [COMPLETED] hydrALAZINE injection 20 mg  20 mg Intravenous ED 1 Time Kendell Valente MD   20 mg at 02/02/24 2131    [COMPLETED] metoprolol injection 5 mg  5 mg Intravenous ED 1 Time Kendell Valente MD   5 mg at 02/02/24 2216    [COMPLETED] potassium chloride SA CR tablet 20 mEq  20 mEq Oral Q2H Elva, Nestor Q., NP   20 mEq at 02/03/24 0102    [DISCONTINUED] diltiaZEM (CARDIZEM) 125 mg in dextrose 5 % (D5W) 125 mL infusion  0-15 mg/hr Intravenous Continuous Elva Nestor Q., NP 5 mL/hr at 02/02/24 2349 5 mg/hr at 02/02/24 2349    [DISCONTINUED] enoxaparin injection 100 mg  100 mg Subcutaneous ED 1 Time Kendell Valente MD        [DISCONTINUED] enoxaparin injection 90 mg  1 mg/kg Subcutaneous Once ElvaTorreyNestor Q., NP         Current Outpatient Medications on File Prior to Encounter   Medication Sig Dispense Refill    aspirin 81 mg Cap Take 81 mg by mouth once daily at 6am.      calcitRIOL (ROCALTROL) 0.25 MCG Cap Take 1 capsule (0.25  mcg total) by mouth once daily. 30 capsule 11    hydrALAZINE (APRESOLINE) 50 MG tablet Take 1 tablet (50 mg total) by mouth 3 (three) times daily. 270 tablet 3    losartan (COZAAR) 50 MG tablet Take 1 tablet (50 mg total) by mouth once daily. 90 tablet 3    metoprolol succinate (TOPROL-XL) 25 MG 24 hr tablet Take 1 tablet (25 mg total) by mouth 2 (two) times daily. 180 tablet 3    NIFEdipine (PROCARDIA-XL) 60 MG (OSM) 24 hr tablet Take 1 tablet (60 mg total) by mouth 2 (two) times a day. 180 tablet 3    nitroGLYCERIN (NITROSTAT) 0.4 MG SL tablet Place 0.4 mg under the tongue.      potassium chloride SA (K-DUR,KLOR-CON) 20 MEQ tablet Take 20 mEq by mouth Daily.      spironolactone (ALDACTONE) 25 MG tablet Take 1 tablet (25 mg total) by mouth once daily. 90 tablet 3       Current Inpatient Medications:    Current Facility-Administered Medications:     aspirin chewable tablet 81 mg, 81 mg, Oral, Daily, Chavez Miles MD    calcitRIOL capsule 0.25 mcg, 0.25 mcg, Oral, Daily, Chavez Miles MD    cloNIDine tablet 0.1 mg, 0.1 mg, Oral, Q6H PRN, Clarke Ely MD    diltiaZEM 125 mg in dextrose 5 % 125 mL IVPB (ready to mix) (titrating), 0-15 mg/hr, Intravenous, Continuous, Clarke Ely MD, Held at 02/03/24 0315    furosemide injection 40 mg, 40 mg, Intravenous, Q12H, Chavez Miles MD    hydrALAZINE injection 10 mg, 10 mg, Intravenous, Q2H PRN, Clarke Ely MD    hydrALAZINE tablet 50 mg, 50 mg, Oral, TID, Chavez Miles MD    melatonin tablet 6 mg, 6 mg, Oral, Nightly PRN, Clarke Ely MD    metoprolol succinate (TOPROL-XL) 24 hr tablet 25 mg, 25 mg, Oral, BID, Chavez Miles MD    NIFEdipine 24 hr tablet 60 mg, 60 mg, Oral, BID, Chavez Miles MD    oseltamivir capsule 30 mg, 30 mg, Oral, Daily, Vanda Alfaro, AGACNP-BC    potassium chloride SA CR tablet 20 mEq, 20 mEq, Oral, Daily, Chavez Miles MD    sodium chloride 0.9% flush 10 mL, 10 mL, Intravenous, PRN, Solis,  Clarke MC MD    spironolactone tablet 25 mg, 25 mg, Oral, Daily, Chavez Miles MD    Current Outpatient Medications:     aspirin 81 mg Cap, Take 81 mg by mouth once daily at 6am., Disp: , Rfl:     calcitRIOL (ROCALTROL) 0.25 MCG Cap, Take 1 capsule (0.25 mcg total) by mouth once daily., Disp: 30 capsule, Rfl: 11    hydrALAZINE (APRESOLINE) 50 MG tablet, Take 1 tablet (50 mg total) by mouth 3 (three) times daily., Disp: 270 tablet, Rfl: 3    losartan (COZAAR) 50 MG tablet, Take 1 tablet (50 mg total) by mouth once daily., Disp: 90 tablet, Rfl: 3    metoprolol succinate (TOPROL-XL) 25 MG 24 hr tablet, Take 1 tablet (25 mg total) by mouth 2 (two) times daily., Disp: 180 tablet, Rfl: 3    NIFEdipine (PROCARDIA-XL) 60 MG (OSM) 24 hr tablet, Take 1 tablet (60 mg total) by mouth 2 (two) times a day., Disp: 180 tablet, Rfl: 3    nitroGLYCERIN (NITROSTAT) 0.4 MG SL tablet, Place 0.4 mg under the tongue., Disp: , Rfl:     potassium chloride SA (K-DUR,KLOR-CON) 20 MEQ tablet, Take 20 mEq by mouth Daily., Disp: , Rfl:     spironolactone (ALDACTONE) 25 MG tablet, Take 1 tablet (25 mg total) by mouth once daily., Disp: 90 tablet, Rfl: 3         VTE Risk Mitigation (From admission, onward)           Ordered     IP VTE HIGH RISK PATIENT  Once         02/03/24 0512     Place sequential compression device  Until discontinued         02/03/24 0512                    Assessment:   Hypertensive Urgency  NSTEMI, unspecified  --Troponin 0.299  CKD3b/4  --BUN/creatinine 61.3/4.08  VHD- moderate MR per TTE 03/2022  Questionable Afib??  AMS  --CT head negative for acute intracranial abnormality  Influenza B +   No hx of GIB    Plan:   Avoid rapid correction of BP  Recommend nephrology consult due to known CKD. Diuretics have been initiated per primary  Trend enzymes to peak. If cath indicated, will need nephrology clearance  Start weight based heparin drip if ok with primary  Obtain echo for comparison  Obtain lipid panel and  A1c    Thank you for your consult.     Amy Gonzalez, P  Cardiology  [facility] - Emergency Dept  2024 7:15 AM           --------------   CARDIOLOGY ATTENDING ADDENDUM   ---------------      Cardiology Attending  I evaluated Jesi Cha.  The patient is a 73 y.o. female with:   Chief Complaint   Patient presents with    Transfer     Tx from [facility] for NSTEMI. Cardizem gtt infusing @ 5mg/hr. Pt denies any complaints at this time         ROS    GEN   an episode of fever las week  No weakness  RESP  + SOB  +/- wheezing  SKIN  + pruritus of breasts  No rash  CARD  had episode of CP  No palpitations        VASC  No cyanosis  + claudication of calves  HEM   No adenopathy  rare nose bleeding  GI  Occas heart burn  No melena    NEURO  + dizziness  No syncope    Patient Active Problem List   Diagnosis    Class 2 severe obesity due to excess calories with serious comorbidity and body mass index (BMI) of 35.0 to 35.9 in adult    Cyst of thyroid    Hypokalemia    Primary hypertension    Mitral regurgitation    Mild aortic regurgitation    CKD (chronic kidney disease) stage 4, GFR 15-29 ml/min    Sinus arrhythmia    Persistent proteinuria    Secondary hyperparathyroidism of renal origin    Elevated blood pressure reading     Past Surgical History:   Procedure Laterality Date    APPENDECTOMY       SECTION      CHOLECYSTECTOMY       Review of patient's allergies indicates:   Allergen Reactions    Codeine          Current Facility-Administered Medications:     aspirin chewable tablet 81 mg, 81 mg, Oral, Daily, Chavez Miles MD, 81 mg at 24 0821    calcitRIOL capsule 0.25 mcg, 0.25 mcg, Oral, Daily, Chavez Miles MD, 0.25 mcg at 24 0822    cloNIDine tablet 0.1 mg, 0.1 mg, Oral, Q6H PRN, Clarke Ely MD    diltiaZEM 125 mg in dextrose 5 % 125 mL IVPB (ready to mix) (titrating), 0-15 mg/hr, Intravenous, Continuous, Clarke Ely MD, Held at 24 [id number]     furosemide injection 40 mg, 40 mg, Intravenous, Q12H, Chavez Miles MD, 40 mg at 02/03/24 0822    hydrALAZINE injection 10 mg, 10 mg, Intravenous, Q2H PRN, Clarke Ely MD, 10 mg at 02/03/24 0744    hydrALAZINE tablet 50 mg, 50 mg, Oral, TID, Chavez Miles MD    melatonin tablet 6 mg, 6 mg, Oral, Nightly PRN, Clarke Ely MD    metoprolol succinate (TOPROL-XL) 24 hr tablet 25 mg, 25 mg, Oral, BID, Chavez Miles MD, 25 mg at 02/03/24 0821    NIFEdipine 24 hr tablet 60 mg, 60 mg, Oral, BID, Chavez Miles MD, 60 mg at 02/03/24 0821    oseltamivir capsule 30 mg, 30 mg, Oral, Daily, Vanda Alfaro AGACNP-BC, 30 mg at 02/03/24 0821    potassium chloride SA CR tablet 20 mEq, 20 mEq, Oral, Daily, Chavez Miles MD    sodium chloride 0.9% flush 10 mL, 10 mL, Intravenous, PRN, Clarke Ely MD    spironolactone tablet 25 mg, 25 mg, Oral, Daily, Chavez Miles MD, 25 mg at 02/03/24 0821    Current Outpatient Medications:     aspirin 81 mg Cap, Take 81 mg by mouth once daily at 6am., Disp: , Rfl:     calcitRIOL (ROCALTROL) 0.25 MCG Cap, Take 1 capsule (0.25 mcg total) by mouth once daily., Disp: 30 capsule, Rfl: 11    hydrALAZINE (APRESOLINE) 50 MG tablet, Take 1 tablet (50 mg total) by mouth 3 (three) times daily., Disp: 270 tablet, Rfl: 3    losartan (COZAAR) 50 MG tablet, Take 1 tablet (50 mg total) by mouth once daily., Disp: 90 tablet, Rfl: 3    metoprolol succinate (TOPROL-XL) 25 MG 24 hr tablet, Take 1 tablet (25 mg total) by mouth 2 (two) times daily., Disp: 180 tablet, Rfl: 3    NIFEdipine (PROCARDIA-XL) 60 MG (OSM) 24 hr tablet, Take 1 tablet (60 mg total) by mouth 2 (two) times a day., Disp: 180 tablet, Rfl: 3    nitroGLYCERIN (NITROSTAT) 0.4 MG SL tablet, Place 0.4 mg under the tongue., Disp: , Rfl:     potassium chloride SA (K-DUR,KLOR-CON) 20 MEQ tablet, Take 20 mEq by mouth Daily., Disp: , Rfl:     spironolactone (ALDACTONE) 25 MG tablet, Take 1 tablet (25 mg total) by mouth  "once daily., Disp: 90 tablet, Rfl: 3    Blood pressure (!) 157/123, pulse 84, temperature 97.9 °F (36.6 °C), temperature source Oral, resp. rate (!) 23, height 5' 3" (1.6 m), weight 86.2 kg (190 lb), SpO2 98 %.    PE    GEN  No acute distress  Not ill appearing  NECK  No cervical adenopathy  No carotid bruit  CV  Normal rate  Regular rhythm  No murmur  PUL  No respiratory distress  No wheezing  No crackles  ABD  No distention  No tenderness  obese   LOW EXT  No deformity  No edema  SKIN  No bruising  No rash  NEURO  Oriented x 3  No weakness      Labs  Last BMP BMP  Lab Results   Component Value Date     (H) 02/03/2024    K 3.7 02/03/2024     (H) 10/30/2021    CO2 22 (L) 02/03/2024    BUN 62.4 (H) 02/03/2024    CREATININE 4.31 (H) 02/03/2024    CALCIUM 9.4 02/03/2024    ANIONGAP 7 (L) 10/30/2021    EGFRNORACEVR 10 02/03/2024      Lab Results   Component Value Date    CREATININE 4.31 (H) 02/03/2024    CREATININE 4.08 (H) 02/02/2024    CREATININE 3.00 (H) 07/11/2023     Last CBC     Lab Results   Component Value Date    WBC 5.98 02/03/2024    HGB 13.2 02/03/2024    HCT 40.0 02/03/2024    MCV 90.1 02/03/2024     02/03/2024           Lab Results   Component Value Date    HGB 13.2 02/03/2024    HGB 13.7 02/02/2024    HGB 12.6 07/11/2023    HCT 40.0 02/03/2024    HCT 43.5 02/02/2024    HCT 39.9 07/11/2023     BNP    Lab Results   Component Value Date    BNP 2,138.0 (H) 02/02/2024     Troponin   Lab Results   Component Value Date    TROPONINI 0.299 (H) 02/03/2024    TROPONINI 0.11 (H) 10/29/2021    TROPONINI 0.09 (H) 10/28/2021     Last lipids    Lab Results   Component Value Date    CHOL 171 02/03/2024    CHOL 152 12/17/2022    CHOL 180 12/08/2021    HDL 71 (H) 02/03/2024    HDL 66 (H) 12/17/2022    HDL 62 (H) 12/08/2021    LDL 91.00 02/03/2024    LDL 79.00 12/17/2022    .00 12/08/2021    TRIG 47 02/03/2024    TRIG 35 (L) 12/17/2022    TRIG 61 12/08/2021    TOTALCHOLEST 2 02/03/2024    " TOTALCHOLEST 2 12/17/2022    TOTALCHOLEST 3 12/08/2021      LFT     Lab Results   Component Value Date    ALT 58 (H) 02/03/2024    ALT 60 (H) 02/02/2024    ALT 14 07/11/2023    AST 40 (H) 02/03/2024    AST 41 (H) 02/02/2024    AST 17 07/11/2023       Echo  No results found for this or any previous visit.    Stress test  No results found for this or any previous visit.    Coronary Angiogram  No results found for this or any previous visit.    Holter Monitor  No cardiac monitor results found for the past 12 months    Assessment/Plan  I agree with the assessment and plan as outlined above  Medications:   consider aspirin, heparin, b-blocker   Trend troponin   Work up:   consider nuclear stress test vs cath  Nephrology consult       Doyle Chatterjee MD  02/03/2024  9:04 AM  Cardiologist

## 2024-02-03 NOTE — ED NOTES
Patient to ER via EMS from outside facility w/ cc of new onset Afib et NSTEMI. Patient states she has been SOB for the past 3 days et presented to hospital today. Noted patient on Cardizem infusing at 5mg/hr. RR nonlabored, NAD. Patient denies pain/ SOB.

## 2024-02-03 NOTE — ED PROVIDER NOTES
Encounter Date: 2024       History     Chief Complaint   Patient presents with    Shortness of Breath     SOB for the past 5 days, worse when lying down. /150 in triage, reports she hasn't been taking her medication.      Shortness of breath   73-year-old female patient comes in with shortness of breath for the past 5 days unfortunately patient decided to just stopped taking her medication with this patient has had elevated blood pressure increasing shortness of breath and lower extremity edema patient is own history source patient has chronic condition of hypokalemia hypertension and previous episodes of congestive heart failure        Review of patient's allergies indicates:   Allergen Reactions    Codeine      Past Medical History:   Diagnosis Date    HTN (hypertension)     Hypokalemia     Obesity, unspecified     Thyroid cyst      Past Surgical History:   Procedure Laterality Date    APPENDECTOMY       SECTION      CHOLECYSTECTOMY       Family History   Problem Relation Age of Onset    Arthritis Mother      Social History     Tobacco Use    Smoking status: Never     Passive exposure: Never    Smokeless tobacco: Never   Substance Use Topics    Alcohol use: Never    Drug use: Never     Review of Systems   Constitutional:  Negative for fever.   HENT:  Negative for sore throat.    Respiratory:  Positive for shortness of breath.    Cardiovascular:  Positive for leg swelling. Negative for chest pain.   Gastrointestinal:  Negative for nausea.   Genitourinary:  Negative for dysuria.   Musculoskeletal:  Negative for back pain.   Skin:  Negative for rash.   Neurological:  Negative for weakness.   Hematological:  Does not bruise/bleed easily.   All other systems reviewed and are negative.      Physical Exam     Initial Vitals [24]   BP Pulse Resp Temp SpO2   (!) 223/150 107 (!) 22 98.1 °F (36.7 °C) 96 %      MAP       --         Physical Exam    Nursing note and vitals  reviewed.  Constitutional: She appears well-developed.   HENT:   Head: Normocephalic and atraumatic.   Right Ear: External ear normal.   Left Ear: External ear normal.   Nose: Nose normal.   Mouth/Throat: Oropharynx is clear and moist. No oropharyngeal exudate.   Eyes: Conjunctivae and EOM are normal. Pupils are equal, round, and reactive to light. Right eye exhibits no discharge. Left eye exhibits no discharge.   Neck: Neck supple. No tracheal deviation present. No JVD present.   Normal range of motion.  Cardiovascular:  Normal rate, regular rhythm, normal heart sounds and intact distal pulses.     Exam reveals no gallop and no friction rub.       No murmur heard.  Pulmonary/Chest: Breath sounds normal. No stridor. No respiratory distress. She has no wheezes. She has no rhonchi. She has no rales.   Abdominal: Abdomen is soft. Bowel sounds are normal. She exhibits no distension and no mass. There is no abdominal tenderness. There is no rebound and no guarding.   Musculoskeletal:         General: Normal range of motion.      Cervical back: Normal range of motion and neck supple.     Neurological: She is alert and oriented to person, place, and time. She has normal strength. No cranial nerve deficit.   Skin: Skin is warm and dry. No rash and no abscess noted. No erythema.   Psychiatric: She has a normal mood and affect. Her behavior is normal. Judgment and thought content normal.         ED Course   Procedures  Labs Reviewed   COMPREHENSIVE METABOLIC PANEL - Abnormal; Notable for the following components:       Result Value    Sodium Level 151 (*)     Potassium Level 3.3 (*)     Chloride 113 (*)     Blood Urea Nitrogen 61.3 (*)     Creatinine 4.08 (*)     Albumin Level 3.0 (*)     Albumin/Globulin Ratio 0.9 (*)     Alanine Aminotransferase 60 (*)     Aspartate Aminotransferase 41 (*)     All other components within normal limits   COVID/RSV/FLU A&B PCR - Abnormal; Notable for the following components:    Influenza B  PCR Detected (*)     All other components within normal limits    Narrative:     The Xpert Xpress SARS-CoV-2/FLU/RSV plus is a rapid, multiplexed real-time PCR test intended for the simultaneous qualitative detection and differentiation of SARS-CoV-2, Influenza A, Influenza B, and respiratory syncytial virus (RSV) viral RNA in either nasopharyngeal swab or nasal swab specimens.         URINALYSIS, REFLEX TO URINE CULTURE - Abnormal; Notable for the following components:    Protein, UA >=300 (*)     Blood, UA Small (*)     All other components within normal limits   B-TYPE NATRIURETIC PEPTIDE - Abnormal; Notable for the following components:    Natriuretic Peptide 2,138.0 (*)     All other components within normal limits   CBC WITH DIFFERENTIAL - Abnormal; Notable for the following components:    MCHC 31.5 (*)     MPV 10.5 (*)     All other components within normal limits   TROPONIN ISTAT - Abnormal; Notable for the following components:    POC Cardiac Troponin I 0.12 (*)     All other components within normal limits   TROPONIN ISTAT - Abnormal; Notable for the following components:    POC Cardiac Troponin I 0.14 (*)     All other components within normal limits   URINALYSIS, MICROSCOPIC - Abnormal; Notable for the following components:    Hyaline Casts, UA Few (*)     Granular Casts, UA Occ (*)     WBC Casts, UA Occ (*)     All other components within normal limits   POCT GLUCOSE - Abnormal; Notable for the following components:    POCT Glucose 132 (*)     All other components within normal limits   CBC W/ AUTO DIFFERENTIAL    Narrative:     The following orders were created for panel order CBC auto differential.  Procedure                               Abnormality         Status                     ---------                               -----------         ------                     CBC with Differential[[id number]]       Abnormal            Final result                 Please view results for these tests on the  individual orders.     EKG Readings: (Independently Interpreted)   Rhythm: Sinus Tachycardia. Ectopy: No Ectopy PVCs. Conduction: Normal. ST Segments: Normal ST Segments. T Waves: Normal. Clinical Impression: Normal Sinus Rhythm     ECG Results              EKG 12-lead (Final result)  Result time 02/04/24 20:56:34      Final result by Interface, Lab In [facility] (02/04/24 20:56:34)                   Narrative:    Test Reason : R07.9,    Vent. Rate : 087 BPM     Atrial Rate : 087 BPM     P-R Int : 184 ms          QRS Dur : 086 ms      QT Int : 450 ms       P-R-T Axes : 036 -27 148 degrees     QTc Int : 541 ms    Sinus rhythm with occasional Premature ventricular complexes and Premature  atrial complexes  Voltage criteria for left ventricular hypertrophy ( R in aVL , Sokolow-Read   , [place] product )  T wave abnormality, consider anterolateral ischemia  Prolonged QT  Abnormal ECG  Confirmed by Phu Eubanks MD ([id number]) on 2/4/2024 8:56:24 PM    Referred By: [email or web address]   SELF           Confirmed By:Phu Eubanks MD                                     EKG 12-LEAD (Final result)  Result time 02/05/24 03:09:12      Final result by Unknown User (02/05/24 03:09:12)                                      Imaging Results              CT Head Without Contrast (Final result)  Result time 02/03/24 06:12:38      Final result by Gene Willard MD (02/03/24 06:12:38)                   Impression:      No acute intracranial abnormality.    Chronic changes as above.      Electronically signed by: Gene Willard MD  Date:    02/03/2024  Time:    06:12               Narrative:    EXAMINATION:  CT of the head without contrast    CLINICAL HISTORY:  Confusion, possible stroke    TECHNIQUE:  Routine CT of the head was performed without contrast    Total DLP: 2486.48 mGy.cm    Automatic exposure control was utilized to reduce the patient's dose    COMPARISON:  None    FINDINGS:  There is no acute intracranial hemorrhage, midline shift,  mass-effect, or extra-axial collection.  No sulcal effacement.  Gray-white matter differentiation is preserved.  The ventricles are normal in size and configuration for patient's age.  There is moderate patchy and confluent areas of decreased attenuation in the periventricular, deep, and subcortical white matter, while nonspecific, most commonly sequela of chronic microvascular ischemia.  Basal cisterns are patent.  Visualized osseous structures are intact.  There is complete opacification of the right maxillary sinus.  There is mild moderate mucosal thickening of the ethmoid air cells.  There is mild mucosal thickening of the left frontal sinus.  There is bony remodeling of the maxillary sinus suggesting chronic sinus disease.  Mastoid air cells are clear.                        Preliminary result by Terrance Harrell MD (02/03/24 00:07:52)                   Impression:    1. No acute intracranial process identified. Details and findings as noted above.               Narrative:    START OF REPORT:  Technique: CT of the head was performed without intravenous contrast with axial as well as coronal and sagittal images.    Comparison: None.    Dosage Information: Automated Exposure Control was utilized 2486.48 mGy.cm.    Clinical history: Sudden onset confusion, possible stroke.    Findings:  Artifact: There is moderate streak and motion artifacts many of the images.  Hemorrhage: No acute intracranial hemorrhage is seen.  CSF spaces: The ventricles, sulci and basal cisterns all appear somewhat prominent suggesting an element of global cerebral atrophy.  Brain parenchyma: Unremarkable with preservation of the grey white junction throughout. There is preservation of the grey white junction throughout. No acute infarct. Moderate scattered microvascular change is seen in portions of the periventricular and deep white matter tracts.  Cerebellum: Unremarkable.  Vascular: Moderate atheromatous calcification of the intracranial  arteries is seen.  Sella and skull base: The sella appears to be within normal limits for age.  Cerebellopontine angles: Within normal limits.  Herniation: None.  Intracranial calcifications: Incidental note is made of bilateral choroid plexus calcification. Incidental note is made of some pineal region calcification. Incidental note is made of some calcification of the falx. Incidental note is made of subtle left basal ganglia calcifcation.  Calvarium: No acute linear or depressed skull fracture is seen.    Maxillofacial Structures:  Paranasal sinuses: There is some opacity and mucoperiosteal thickening in the right. This may reflect acute on chronic sinusitis. Correlate clinically.  Orbits: The orbits appear unremarkable.  Zygomatic arches: The zygomatic arches are intact and unremarkable.  Temporal bones and mastoids: The temporal bones and mastoids appear unremarkable.  TMJ: The mandibular condyles appear normally placed with respect to the mandibular fossa.  Nasal Bones: The nasal septum is midline. No displaced nasal bone fracture is seen.    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    Notifications: The results were discussed with the emergency room physician (Dr Valente) prior to dictation at 2024-02-03 00:02:28 CST.                                         X-Ray Chest 1 View (Final result)  Result time 02/03/24 06:02:22      Final result by Gene Willard MD (02/03/24 06:02:22)                   Impression:      Cardiac enlargement with central vascular congestion and perihilar interstitial and mild ground-glass opacities, suggesting mild pulmonary edema.      Electronically signed by: Gene Willard MD  Date:    02/03/2024  Time:    06:02               Narrative:    EXAMINATION:  Chest one view    CLINICAL HISTORY:  Shortness of breath    COMPARISON:  11/01/2017    FINDINGS:  Cardiac silhouette is enlarged.  There is central  vascular congestion with mild perihilar interstitial markings and alveolar ground-glass densities which may reflect mild pulmonary edema.  No visible pneumothorax or consolidation.                                       RADIOLOGY REPORT (Final result)  Result time 02/05/24 03:09:24      Final result by Unknown User (02/05/24 03:09:24)                                         Medications   furosemide injection 80 mg (80 mg Intravenous Given 2/2/24 2043)   cloNIDine tablet 0.3 mg (0.3 mg Oral Given 2/2/24 2042)   hydrALAZINE injection 20 mg (20 mg Intravenous Given 2/2/24 2131)   metoprolol injection 5 mg (5 mg Intravenous Given 2/2/24 2216)   potassium chloride SA CR tablet 20 mEq (20 mEq Oral Given 2/3/24 0102)   enoxaparin injection 100 mg (100 mg Subcutaneous Given 2/3/24 0102)     Medical Decision Making  Hypertension non-STEMI congestive heart failure noncompliance with medications    Amount and/or Complexity of Data Reviewed  Labs: ordered.  Radiology: ordered.    Risk  Prescription drug management.                                      Clinical Impression:  Final diagnoses:  [R07.9] Chest pain  [I21.4] NSTEMI (non-ST elevated myocardial infarction) (Primary)  [N19] Renal failure, unspecified chronicity  [I48.91] Atrial fibrillation, unspecified type  [R60.9] Swelling          ED Disposition Condition    Transfer to Another Facility Stable                Kendell Valente MD  02/05/24 [id number]       Kendell Valente MD  02/05/24 2080

## 2024-02-03 NOTE — ED TRIAGE NOTES
Tx from [facility] for NSTEMI. Cardizem gtt infusing @ 5mg/hr. Pt denies any complaints at this time

## 2024-02-03 NOTE — ED PROVIDER NOTES
Encounter Date: 2/3/2024       History     Chief Complaint   Patient presents with    Transfer     Tx from [facility] for NSTEMI. Cardizem gtt infusing @ 5mg/hr. Pt denies any complaints at this time     Patient is a 73-year-old female who was transferred from an outlying facility secondary to shortness breath and hypertensive urgency.  Patient patient's blood pressure was treated at the transferring facility and also started on Cardizem for going into an AFib with RVR rhythm.  Patient is presenting blood pressure was 220/150.  It was reported that patient had some pulmonary edema on chest x-ray.  Also noted was patient's creatinine earlier tonight was 4 and her baseline is around 2.5-2.7 patient denies prior history of congestive heart failure or atrial fibrillation.  Patient arrives on a Cardizem infusion.  Patient states that she stopped taking her blood pressure medications approximately 5 days ago.  Patient's troponin was mildly elevated at the transferring facility.    Primary care physician is Dr. Flores      Review of patient's allergies indicates:   Allergen Reactions    Codeine      Past Medical History:   Diagnosis Date    HTN (hypertension)     Hypokalemia     Obesity, unspecified     Thyroid cyst      Past Surgical History:   Procedure Laterality Date    APPENDECTOMY       SECTION      CHOLECYSTECTOMY       Family History   Problem Relation Age of Onset    Arthritis Mother      Social History     Tobacco Use    Smoking status: Never     Passive exposure: Never    Smokeless tobacco: Never   Substance Use Topics    Alcohol use: Never    Drug use: Never     Review of Systems   Constitutional: Negative.    HENT: Negative.     Respiratory:  Positive for shortness of breath. Negative for cough and chest tightness.    Cardiovascular:  Positive for leg swelling. Negative for chest pain and palpitations.   Gastrointestinal: Negative.    Musculoskeletal: Negative.    Neurological: Negative.     Psychiatric/Behavioral: Negative.         Physical Exam     Initial Vitals   BP Pulse Resp Temp SpO2   02/03/24 0210 02/03/24 0210 02/03/24 0211 02/03/24 0211 02/03/24 0210   (!) 173/121 67 18 98.4 °F (36.9 °C) 99 %      MAP       --                Physical Exam    Nursing note and vitals reviewed.  Constitutional: She appears well-developed and well-nourished.   Patient is in no apparent distress, systolic blood pressure is currently in the 170s.   Neck: Neck supple.   Normal range of motion.  Cardiovascular:  Normal rate, regular rhythm and normal heart sounds.           Pulmonary/Chest: Breath sounds normal. No respiratory distress. She has no wheezes. She has no rhonchi. She has no rales.   Abdominal: Abdomen is soft. She exhibits no distension. There is no abdominal tenderness. There is no guarding.   Musculoskeletal:         General: Normal range of motion.      Cervical back: Normal range of motion and neck supple.     Neurological: She is alert and oriented to person, place, and time. She has normal strength.   Skin: Skin is dry.   Psychiatric: She has a normal mood and affect. Her behavior is normal. Thought content normal.         ED Course   Procedures  Labs Reviewed   TROPONIN I - Abnormal; Notable for the following components:       Result Value    Troponin-I 0.299 (*)     All other components within normal limits   COMPREHENSIVE METABOLIC PANEL - Abnormal; Notable for the following components:    Sodium Level 149 (*)     Chloride 114 (*)     Carbon Dioxide 22 (*)     Glucose Level 125 (*)     Blood Urea Nitrogen 62.4 (*)     Creatinine 4.31 (*)     Albumin Level 3.2 (*)     Alanine Aminotransferase 58 (*)     Aspartate Aminotransferase 40 (*)     All other components within normal limits   CBC WITH DIFFERENTIAL - Abnormal; Notable for the following components:    MPV 10.5 (*)     All other components within normal limits   CBC W/ AUTO DIFFERENTIAL    Narrative:     The following orders were created  for panel order CBC auto differential.  Procedure                               Abnormality         Status                     ---------                               -----------         ------                     CBC with Differential[[id number]]       Abnormal            Final result                 Please view results for these tests on the individual orders.     EKG Readings: (Independently Interpreted)   Initial Reading: No STEMI. Rhythm: Normal Sinus Rhythm. Heart Rate: 85. Ectopy: PACs. ST Segments: Normal ST Segments. T Waves Flipped: V4, V5, V6 and II. Axis: Normal. Other Findings: Prolonged QT Interval.       Imaging Results              US Retroperitoneal Complete (In process)                      Medications   sodium chloride 0.9% flush 10 mL (has no administration in time range)   melatonin tablet 6 mg (has no administration in time range)   hydrALAZINE injection 10 mg (has no administration in time range)   cloNIDine tablet 0.1 mg (has no administration in time range)   diltiaZEM 125 mg in dextrose 5 % 125 mL IVPB (ready to mix) (titrating) (0 mg/hr Intravenous Hold 2/3/24 0315)   oseltamivir capsule 30 mg (has no administration in time range)     Medical Decision Making  Differential diagnosis:  Noncompliance with medications, hypertensive urgency, pulmonary edema, congestive heart failure, atrial fibrillation with RVR    Amount and/or Complexity of Data Reviewed  Labs: ordered.     Details: Troponin trended up a bit, patient asymptomatic at this time, no chest pain, no shortness of breath  Discussion of management or test interpretation with external provider(s): Patient is a transfer from outlying facility secondary to hypertensive emergency an elevated troponin.  Also of note is patient went to an AFib with RVR rhythm while in the ER.  Currently patient is in a normal sinus rhythm.  Blood pressure is still somewhat elevated but much improved from previous.  At this time patient has no complaints.   Patient will be admitted to the hospitalist team               ED Course as of 02/03/24 0627   Sat Feb 03, 2024   0448 Patient remains in no apparent distress.  Patient's blood pressure is well controlled at this time [KG]   0449 Vanda JUDD, OK to admit for observation [KG]   0618 Troponin trended up a bit, patient still denies any chest pain [KG]      ED Course User Index  [KG] Uriah Green MD                           Clinical Impression:  Final diagnoses:  [R79.89] Elevated troponin  [R03.0] Elevated blood pressure reading (Primary)  [R06.02] Shortness of breath  [J10.1] Influenza B  [R79.89] Elevated serum creatinine          ED Disposition Condition    Observation Stable                Uriah Green MD  02/03/24 0449       Uriah Green MD  02/03/24 0618       Uriah Green MD  02/03/24 0619       Uriah Green MD  02/03/24 0626       Uriah Green MD  02/03/24 0627

## 2024-02-03 NOTE — H&P
[facility]  Hospital Medicine History & Physical Examination       Patient Name: Jesi Cha  MRN: [id number]  Patient Class: OP- Observation   Admission Date: 2/3/2024   Admitting Physician:  Service   Attending Physician: Chavez Miles MD  Primary Care Provider: Dk Rodriguez MD  Face-to-Face encounter date: 02/03/2024  Code Status:Code Status Discussion Note   Chief Complaint: Transfer (Tx from [facility] for NSTEMI. Cardizem gtt infusing @ 5mg/hr. Pt denies any complaints at this time)          Patient information was obtained from patient, patient's family, past medical records and ER records.     HISTORY OF PRESENT ILLNESS:   Jesi Cha is a 73 y.o. female who  has a past medical history of HTN (hypertension), Hypokalemia, Obesity, unspecified, and Thyroid cyst.. The patient presented to [facility] on 2/3/2024     73-year-old female with past medical history of hypertension presents to the emergency room outside facility 02/02/2024 with complaint shortness breath.  Reports that symptoms started 4 days prior.  Associated with a nonproductive cough.  Denies chest pain.  Had some mild nausea emesis.  Also reports increased peripheral edema.  Denies a known history of heart failure.  He was not followed by cardiologist as an.  At outside facility her blood pressure is noted to sustain over 200.  She was transferred to [facility] for higher level of care.  Upon arrival to our ER her blood pressure is 173/121.  She was afebrile.  He was noted to be tachypneic with a respiratory rate up to 34 but sustaining on room air with good oxygen saturations.  Labs showed no leukocytosis.  She was some mild hypokalemia at 3.3.  Sodium elevated at 151.  Creatinine is 4.08.  Labs from about 6 months ago show baseline around 2.7.  She was denies any known kidney disease.  She also tested positive for influenza B. BNP was 2100.  Troponin 0.299.  Showed chest x-ray showed bilateral  pulmonary edema.  She was started on IV Lasix.  She was being admitted to the hospitalist group    PAST MEDICAL HISTORY:     Past Medical History:   Diagnosis Date    HTN (hypertension)     Hypokalemia     Obesity, unspecified     Thyroid cyst        PAST SURGICAL HISTORY:     Past Surgical History:   Procedure Laterality Date    APPENDECTOMY       SECTION      CHOLECYSTECTOMY         ALLERGIES:   Codeine    FAMILY HISTORY:   Reviewed and negative    SOCIAL HISTORY:     Social History     Tobacco Use    Smoking status: Never     Passive exposure: Never    Smokeless tobacco: Never   Substance Use Topics    Alcohol use: Never        HOME MEDICATIONS:     Prior to Admission medications    Medication Sig Start Date End Date Taking? Authorizing Provider   aspirin 81 mg Cap Take 81 mg by mouth once daily at 6am. 21   Provider, Historical   calcitRIOL (ROCALTROL) 0.25 MCG Cap Take 1 capsule (0.25 mcg total) by mouth once daily. 6/16/23 6/15/24  Peter Solomon FNP   hydrALAZINE (APRESOLINE) 50 MG tablet Take 1 tablet (50 mg total) by mouth 3 (three) times daily. 6/16/23 6/15/24  Peter Solomon FNP   losartan (COZAAR) 50 MG tablet Take 1 tablet (50 mg total) by mouth once daily. 23  Dk Rodriguez MD   metoprolol succinate (TOPROL-XL) 25 MG 24 hr tablet Take 1 tablet (25 mg total) by mouth 2 (two) times daily. 6/16/23 6/15/24  Peter Solomon FNP   NIFEdipine (PROCARDIA-XL) 60 MG (OSM) 24 hr tablet Take 1 tablet (60 mg total) by mouth 2 (two) times a day. 23  Dk Rodriguez MD   nitroGLYCERIN (NITROSTAT) 0.4 MG SL tablet Place 0.4 mg under the tongue. 10/31/21   Provider, Historical   potassium chloride SA (K-DUR,KLOR-CON) 20 MEQ tablet Take 20 mEq by mouth Daily. 23   Provider, Historical   spironolactone (ALDACTONE) 25 MG tablet Take 1 tablet (25 mg total) by mouth once daily. 6/16/23 6/15/24  Peter Solomon FNP       REVIEW OF SYSTEMS:   Except as  documented, all other systems reviewed and negative     PHYSICAL EXAM:     VITAL SIGNS: 24 HRS MIN & MAX LAST   Temp  Min: 98.1 °F (36.7 °C)  Max: 98.4 °F (36.9 °C) 98.4 °F (36.9 °C)   BP  Min: 118/92  Max: 227/145 (!) 171/120   Pulse  Min: 67  Max: 175  90   Resp  Min: 8  Max: 34 (!) 27   SpO2  Min: 90 %  Max: 100 % 99 %       General appearance: Well-developed, well-nourished female in no apparent distress.  HENT: Atraumatic head. Moist mucous membranes of oral cavity.  Eyes: Normal extraocular movements.   Neck: Supple.   Lungs: Clear to auscultation bilaterally. No wheezing present.   Heart: Regular rate and rhythm. S1 and S2 present with no murmurs/gallop/rub. No pedal edema. No JVD present.   Abdomen: Soft, non-distended, non-tender. No rebound tenderness/guarding. Bowel sounds are normal.   Extremities: No cyanosis, clubbing, or edema.  Skin: No Rash.   Neuro: Motor and sensory exams grossly intact. Good tone. Muscle strength 5/5 in all 4 extremities  Psych/mental status: Appropriate mood and affect. Responds appropriately to questions.     LABS AND IMAGING:     Recent Labs   Lab 02/02/24 2036 02/03/24  0528   WBC 7.87 5.98   RBC 4.75 4.44   HGB 13.7 13.2   HCT 43.5 40.0   MCV 91.6 90.1   MCH 28.8 29.7   MCHC 31.5* 33.0   RDW 15.6 15.9    190   MPV 10.5* 10.5*       Recent Labs   Lab 02/02/24 2109 02/03/24  0528   * 149*   K 3.3* 3.7   CO2 23 22*   BUN 61.3* 62.4*   CREATININE 4.08* 4.31*   CALCIUM 9.3 9.4   ALBUMIN 3.0* 3.2*   ALKPHOS 79 70   ALT 60* 58*   AST 41* 40*   BILITOT 0.8 0.8       Microbiology Results (last 7 days)       ** No results found for the last 168 hours. **             CT Head Without Contrast  Narrative: EXAMINATION:  CT of the head without contrast    CLINICAL HISTORY:  Confusion, possible stroke    TECHNIQUE:  Routine CT of the head was performed without contrast    Total DLP: 2486.48 mGy.cm    Automatic exposure control was utilized to reduce the patient's  dose    COMPARISON:  None    FINDINGS:  There is no acute intracranial hemorrhage, midline shift, mass-effect, or extra-axial collection.  No sulcal effacement.  Gray-white matter differentiation is preserved.  The ventricles are normal in size and configuration for patient's age.  There is moderate patchy and confluent areas of decreased attenuation in the periventricular, deep, and subcortical white matter, while nonspecific, most commonly sequela of chronic microvascular ischemia.  Basal cisterns are patent.  Visualized osseous structures are intact.  There is complete opacification of the right maxillary sinus.  There is mild moderate mucosal thickening of the ethmoid air cells.  There is mild mucosal thickening of the left frontal sinus.  There is bony remodeling of the maxillary sinus suggesting chronic sinus disease.  Mastoid air cells are clear.  Impression: No acute intracranial abnormality.    Chronic changes as above.    Electronically signed by: Gene Willard MD  Date:    02/03/2024  Time:    06:12  X-Ray Chest 1 View  Narrative: EXAMINATION:  Chest one view    CLINICAL HISTORY:  Shortness of breath    COMPARISON:  11/01/2017    FINDINGS:  Cardiac silhouette is enlarged.  There is central vascular congestion with mild perihilar interstitial markings and alveolar ground-glass densities which may reflect mild pulmonary edema.  No visible pneumothorax or consolidation.  Impression: Cardiac enlargement with central vascular congestion and perihilar interstitial and mild ground-glass opacities, suggesting mild pulmonary edema.    Electronically signed by: Gene Willard MD  Date:    02/03/2024  Time:    06:02      _____________________________________  INPATIENT LIST OF MEDICATIONS     Current Facility-Administered Medications:     cloNIDine tablet 0.1 mg, 0.1 mg, Oral, Q6H PRN, Clarke Ely MD    diltiaZEM 125 mg in dextrose 5 % 125 mL IVPB (ready to mix) (titrating), 0-15 mg/hr, Intravenous,  Continuous, Clarke Ely MD, Held at 02/03/24 0315    hydrALAZINE injection 10 mg, 10 mg, Intravenous, Q2H PRN, Clarke Ely MD    melatonin tablet 6 mg, 6 mg, Oral, Nightly PRN, Clarke Ely MD    oseltamivir capsule 30 mg, 30 mg, Oral, Daily, Vanda Alfaro AGACNP-BC    sodium chloride 0.9% flush 10 mL, 10 mL, Intravenous, PRN, Clarke Ely MD    Current Outpatient Medications:     aspirin 81 mg Cap, Take 81 mg by mouth once daily at 6am., Disp: , Rfl:     calcitRIOL (ROCALTROL) 0.25 MCG Cap, Take 1 capsule (0.25 mcg total) by mouth once daily., Disp: 30 capsule, Rfl: 11    hydrALAZINE (APRESOLINE) 50 MG tablet, Take 1 tablet (50 mg total) by mouth 3 (three) times daily., Disp: 270 tablet, Rfl: 3    losartan (COZAAR) 50 MG tablet, Take 1 tablet (50 mg total) by mouth once daily., Disp: 90 tablet, Rfl: 3    metoprolol succinate (TOPROL-XL) 25 MG 24 hr tablet, Take 1 tablet (25 mg total) by mouth 2 (two) times daily., Disp: 180 tablet, Rfl: 3    NIFEdipine (PROCARDIA-XL) 60 MG (OSM) 24 hr tablet, Take 1 tablet (60 mg total) by mouth 2 (two) times a day., Disp: 180 tablet, Rfl: 3    nitroGLYCERIN (NITROSTAT) 0.4 MG SL tablet, Place 0.4 mg under the tongue., Disp: , Rfl:     potassium chloride SA (K-DUR,KLOR-CON) 20 MEQ tablet, Take 20 mEq by mouth Daily., Disp: , Rfl:     spironolactone (ALDACTONE) 25 MG tablet, Take 1 tablet (25 mg total) by mouth once daily., Disp: 90 tablet, Rfl: 3      Scheduled Meds:   oseltamivir  30 mg Oral Daily     Continuous Infusions:   dilTIAZem Stopped (02/03/24 0315)     PRN Meds:.cloNIDine, hydrALAZINE, melatonin, sodium chloride 0.9%      VTE Prophylaxis: will be placed on appropriate DVT prophylaxis and will be advised to be as mobile as possible and sit in a chair as tolerated  _____________________________________    ASSESSMENT & PLAN:   Acute heart failure exacerbation   Hypertensive urgency   NSTEMI, type 2 demand ischemia   Influenza B    Acute on chronic kidney disease, unknown stage    Patient was a poor historian not sure how much she follows up as an outpatient so difficult to get what her baseline function is.  Her blood pressure is improved.  Will continue with IV antihypertensives for now.  Will have to review her home medications as well.  She was started on diltiazem infusion per Cardiology recommendations.  We will follow up later this morning.  Echocardiogram has been ordered.  Will trend out her cardiac enzymes.  She was not currently on any anticoagulation.  The appears that her baseline kidney function is with a creatinine about 2.5-2.7.  4.0 today.  Suspect this is secondary to volume overload.  Will continue with IV Lasix and monitor response.  She was also on calcitriol which nicely think that they have been treating chronic kidney disease inpatient is unaware.  She was positive for influenza B.  He was not appear to be overtly symptomatic from this.  Afebrile and no leukocytosis.  ER has already started on Tamiflu.  Will go ahead and continue this for 5 days.  Isolation precautions in place    Critical care diagnosis: hypertensive urgency requiring iv antihypertensives  Critical care interventions: hands on evaluation, review of labs/radiographs/records and discussions with family  Critical care time spent: >32 minutes        Chavez Miles MD  02/03/2024  6:37 AM

## 2024-02-03 NOTE — CONSULTS
[facility] - Emergency Dept  Cardiology  Consult Note    Patient Name: Jesi Cha  MRN: [id number]  Admission Date: 2024  Hospital Length of Stay: 0 days  Code Status: No Order   Attending Provider: Kendell Valente MD   Consulting Provider: Nestor Stevenson NP  Primary Care Physician: Dk Rodriguez MD  Principal Problem:<principal problem not specified>    Patient information was obtained from patient, ER records, and primary team.     Consults  Subjective:     Chief Complaint:    Telecardiology Consult  Loction: [facility] ER  Provider: Dr Valente  Reason: > trop  Duration: > 30 minutes incultiding review of medical records, provider and patient interview    HPI:   74-year-old female unknown to our services.  She has a history of hypertension and chronic kidney disease stage 3 or 4.  She states she quit taking all her medications about a week ago.  She is become short of breath develop some lower extremity edema she presented to the hospital for evaluation.  Upon arrival her blood pressure was 223/150 an EKG showed sinus arrhythmia/tachycardia with some lateral ST-T wave abnormalities.  Lab work showed a mild bump in her troponin.  Potassium was also low at 3.3    She was given Lasix, hydralazine and Lopressor with improvement of her symptoms.    It was very difficult to get accurate history and physical out of her she was confused at times during our interview.  She will be going for a CT of her head before she would given Lovenox      Past Medical History:   Diagnosis Date    HTN (hypertension)     Hypokalemia     Obesity, unspecified     Thyroid cyst        Past Surgical History:   Procedure Laterality Date    APPENDECTOMY       SECTION      CHOLECYSTECTOMY         Review of patient's allergies indicates:   Allergen Reactions    Codeine        No current facility-administered medications on file prior to encounter.     Current Outpatient Medications on File Prior to Encounter    Medication Sig    aspirin 81 mg Cap Take 81 mg by mouth once daily at 6am.    calcitRIOL (ROCALTROL) 0.25 MCG Cap Take 1 capsule (0.25 mcg total) by mouth once daily.    hydrALAZINE (APRESOLINE) 50 MG tablet Take 1 tablet (50 mg total) by mouth 3 (three) times daily.    losartan (COZAAR) 50 MG tablet Take 1 tablet (50 mg total) by mouth once daily.    metoprolol succinate (TOPROL-XL) 25 MG 24 hr tablet Take 1 tablet (25 mg total) by mouth 2 (two) times daily.    NIFEdipine (PROCARDIA-XL) 60 MG (OSM) 24 hr tablet Take 1 tablet (60 mg total) by mouth 2 (two) times a day.    nitroGLYCERIN (NITROSTAT) 0.4 MG SL tablet Place 0.4 mg under the tongue.    potassium chloride SA (K-DUR,KLOR-CON) 20 MEQ tablet Take 20 mEq by mouth Daily.    spironolactone (ALDACTONE) 25 MG tablet Take 1 tablet (25 mg total) by mouth once daily.     Family History       Problem Relation (Age of Onset)    Arthritis Mother          Tobacco Use    Smoking status: Never     Passive exposure: Never    Smokeless tobacco: Never   Substance and Sexual Activity    Alcohol use: Never    Drug use: Never    Sexual activity: Yes     Review of Systems   Constitutional: Positive for malaise/fatigue. Negative for fever.   HENT: Negative.     Eyes: Negative.    Cardiovascular:  Positive for dyspnea on exertion and leg swelling. Negative for chest pain and cyanosis.   Respiratory:  Positive for shortness of breath and sleep disturbances due to breathing.    Skin: Negative.    Gastrointestinal: Negative.    Genitourinary: Negative.    Neurological: Negative.    Psychiatric/Behavioral:  Positive for altered mental status.      Objective:     Vital Signs (Most Recent):  Temp: 98.1 °F (36.7 °C) (02/02/24 2020)  Pulse: 84 (02/02/24 2302)  Resp: 14 (02/02/24 2302)  BP: (!) 150/101 (02/02/24 2302)  SpO2: 98 % (02/02/24 2302) Vital Signs (24h Range):  Temp:  [98.1 °F (36.7 °C)] 98.1 °F (36.7 °C)  Pulse:  [] 84  Resp:  [14-28] 14  SpO2:  [90 %-100 %] 98 %  BP:  (118-227)/() 150/101     Weight: 88.5 kg (195 lb)  Body mass index is 34.54 kg/m².    SpO2: 98 %         Intake/Output Summary (Last 24 hours) at 2/2/2024 2307  Last data filed at 2/2/2024 2221  Gross per 24 hour   Intake --   Output 200 ml   Net -200 ml       Lines/Drains/Airways       Peripheral Intravenous Line  Duration                  Peripheral IV - Single Lumen 02/02/24 2037 20 G Right Antecubital <1 day                    Physical Exam  Constitutional:       Appearance: Normal appearance. She is normal weight.   HENT:      Head: Normocephalic.      Nose: Nose normal.      Mouth/Throat:      Mouth: Mucous membranes are moist.   Eyes:      Extraocular Movements: Extraocular movements intact.   Cardiovascular:      Rate and Rhythm: Tachycardia present. Rhythm irregular.   Pulmonary:      Effort: Pulmonary effort is normal.      Comments: Decreased at bases  Musculoskeletal:      Cervical back: Normal range of motion.   Neurological:      General: No focal deficit present.      Mental Status: She is alert.   Psychiatric:         Mood and Affect: Mood normal.         Significant Labs:   Recent Lab Results  (Last 5 results in the past 24 hours)        02/02/24 2129 02/02/24 2112 02/02/24 2109 02/02/24 2054 02/02/24 2036        RSV Ag by Molecular Method       Not Detected         Influenza A, Molecular       Not Detected         Influenza B, Molecular       Detected         Albumin/Globulin Ratio     0.9           Albumin     3.0           ALP     79           ALT     60           Appearance, UA Clear               AST     41           Bacteria, UA None Seen               Baso #         0.02       Basophil %         0.3       BILIRUBIN TOTAL     0.8           Bilirubin, UA Negative               BNP         2,138.0       BUN     61.3           Calcium     9.3           Chloride     113           CO2     23           Color, UA Yellow               Creatinine     4.08           eGFR     11            Eos #         0.17       Eos %         2.2       Globulin, Total     3.4           Glucose     110           Glucose, UA Negative               Granular Casts, UA Occ               Hematocrit         43.5       Hemoglobin         13.7       Hyaline Casts, UA Few               Immature Grans (Abs)         0.01       Immature Granulocytes         0.1       Ketones, UA Negative               Leukocyte Esterase, UA Negative               Lymph #         2.71       LYMPH %         34.4       MCH         28.8       MCHC         31.5       MCV         91.6       Mono #         0.88       Mono %         11.2       MPV         10.5       Neut #         4.08       Neut %         51.8       NITRITE UA Negative               Blood, UA Small               pH, UA 6.0               Platelet Count         209       POC Cardiac Troponin I   0.14             Potassium     3.3           PROTEIN TOTAL     6.4           Protein, UA >=300               RBC         4.75       RBC, UA 0-2               RDW         15.6       Sample   unknown  Comment: A single negative troponin is insufficient to rule out myocardial infarction.  The use of a serial sampling protocol is recommended practice. Correlate results with reference intervals established for methodology used. Point of care and core laboratory   troponin results are not interchangeable.               SARS-CoV2 (COVID-19) Qualitative PCR       Not Detected         Sodium     151           Specific Gravity,UA 1.025               Squam Epithel, UA Occ               Urobilinogen, UA 0.2               WBC Casts, UA Occ               WBC, UA 0-2               WBC         7.87                              Significant Imaging:   EKG      CXR  Assessment and Plan:     Impression:  SOB/New CHF  - LE edema  > Trop  Abnormal EKG/PAF in ER   HTN    Plan:  I feel with her acute kidney injury, paroxysmal atrial fibrillation and hypertensive urgency she should be transferred to a facility where  Nephrology and Cardiology is available.  Continue cycle cardiac biomarkers  Tele monitoring   Agree with CT of her head due to her confusion and if that is okay with give her Lovenox mg per kg subQ x1  Cardizem drip titrate for heart rate  Aspirin 81 daily    If she gets transferred to [facility] or a facility where [facility] is located we will be happy to follow her    Thank you for the consultation should you have any questions or concerns please do not hesitate to call    There are no hospital problems to display for this patient.      VTE Risk Mitigation (From admission, onward)      None            Thank you for your consult.     Nestor Stevenson, DUTCH  Cardiology   [facility] - Emergency Dept

## 2024-02-03 NOTE — ED NOTES
Pt appears to have a mental status change. Unable to get words out. Appears confused. MD notified. New orders noted.

## 2024-02-04 LAB
ANION GAP SERPL CALC-SCNC: 12 MEQ/L
APTT PPP: 33.9 SECONDS (ref 23.2–33.7)
APTT PPP: 60.1 SECONDS (ref 23.2–33.7)
BASOPHILS # BLD AUTO: 0.03 X10(3)/MCL
BASOPHILS NFR BLD AUTO: 0.5 %
BUN SERPL-MCNC: 57.9 MG/DL (ref 9.8–20.1)
CALCIUM SERPL-MCNC: 9.1 MG/DL (ref 8.4–10.2)
CHLORIDE SERPL-SCNC: 112 MMOL/L (ref 98–107)
CO2 SERPL-SCNC: 23 MMOL/L (ref 23–31)
CREAT SERPL-MCNC: 4.39 MG/DL (ref 0.55–1.02)
CREAT UR-MCNC: 36.5 MG/DL (ref 45–106)
CREAT/UREA NIT SERPL: 13
EOSINOPHIL # BLD AUTO: 0.12 X10(3)/MCL (ref 0–0.9)
EOSINOPHIL NFR BLD AUTO: 1.9 %
ERYTHROCYTE [DISTWIDTH] IN BLOOD BY AUTOMATED COUNT: 16 % (ref 11.5–17)
GFR SERPLBLD CREATININE-BSD FMLA CKD-EPI: 10 MLS/MIN/1.73/M2
GLUCOSE SERPL-MCNC: 92 MG/DL (ref 82–115)
HCT VFR BLD AUTO: 41.6 % (ref 37–47)
HGB BLD-MCNC: 13.3 G/DL (ref 12–16)
IMM GRANULOCYTES # BLD AUTO: 0.02 X10(3)/MCL (ref 0–0.04)
IMM GRANULOCYTES NFR BLD AUTO: 0.3 %
LYMPHOCYTES # BLD AUTO: 1.43 X10(3)/MCL (ref 0.6–4.6)
LYMPHOCYTES NFR BLD AUTO: 22.8 %
MAGNESIUM SERPL-MCNC: 2.3 MG/DL (ref 1.6–2.6)
MCH RBC QN AUTO: 29.6 PG (ref 27–31)
MCHC RBC AUTO-ENTMCNC: 32 G/DL (ref 33–36)
MCV RBC AUTO: 92.7 FL (ref 80–94)
MONOCYTES # BLD AUTO: 0.65 X10(3)/MCL (ref 0.1–1.3)
MONOCYTES NFR BLD AUTO: 10.4 %
NEUTROPHILS # BLD AUTO: 4.03 X10(3)/MCL (ref 2.1–9.2)
NEUTROPHILS NFR BLD AUTO: 64.1 %
NRBC BLD AUTO-RTO: 0 %
PLATELET # BLD AUTO: 198 X10(3)/MCL (ref 130–400)
PMV BLD AUTO: 10.3 FL (ref 7.4–10.4)
POTASSIUM SERPL-SCNC: 3.7 MMOL/L (ref 3.5–5.1)
PROT UR STRIP-MCNC: 110.1 MG/DL
RBC # BLD AUTO: 4.49 X10(6)/MCL (ref 4.2–5.4)
SODIUM SERPL-SCNC: 147 MMOL/L (ref 136–145)
SODIUM UR-SCNC: 114 MMOL/L
URINE PROTEIN/CREATININE RATIO (OLG): 3
WBC # SPEC AUTO: 6.28 X10(3)/MCL (ref 4.5–11.5)

## 2024-02-04 PROCEDURE — 63600175 PHARM REV CODE 636 W HCPCS: Performed by: HOSPITALIST

## 2024-02-04 PROCEDURE — 21400001 HC TELEMETRY ROOM

## 2024-02-04 PROCEDURE — 25000003 PHARM REV CODE 250: Performed by: NURSE PRACTITIONER

## 2024-02-04 PROCEDURE — 27000207 HC ISOLATION

## 2024-02-04 PROCEDURE — 80048 BASIC METABOLIC PNL TOTAL CA: CPT | Performed by: HOSPITALIST

## 2024-02-04 PROCEDURE — 84300 ASSAY OF URINE SODIUM: CPT | Performed by: NURSE PRACTITIONER

## 2024-02-04 PROCEDURE — 82570 ASSAY OF URINE CREATININE: CPT | Performed by: NURSE PRACTITIONER

## 2024-02-04 PROCEDURE — 85730 THROMBOPLASTIN TIME PARTIAL: CPT | Performed by: NURSE PRACTITIONER

## 2024-02-04 PROCEDURE — 25000003 PHARM REV CODE 250: Performed by: HOSPITALIST

## 2024-02-04 PROCEDURE — 85730 THROMBOPLASTIN TIME PARTIAL: CPT | Performed by: HOSPITALIST

## 2024-02-04 PROCEDURE — 97161 PT EVAL LOW COMPLEX 20 MIN: CPT

## 2024-02-04 PROCEDURE — 85025 COMPLETE CBC W/AUTO DIFF WBC: CPT | Performed by: HOSPITALIST

## 2024-02-04 PROCEDURE — 63600175 PHARM REV CODE 636 W HCPCS: Performed by: NURSE PRACTITIONER

## 2024-02-04 PROCEDURE — 83735 ASSAY OF MAGNESIUM: CPT | Performed by: HOSPITALIST

## 2024-02-04 RX ORDER — HEPARIN SODIUM,PORCINE/D5W 25000/250
12 INTRAVENOUS SOLUTION INTRAVENOUS CONTINUOUS
Status: DISCONTINUED | OUTPATIENT
Start: 2024-02-04 | End: 2024-02-06

## 2024-02-04 RX ADMIN — HEPARIN SODIUM 12 UNITS/KG/HR: 10000 INJECTION, SOLUTION INTRAVENOUS at 01:02

## 2024-02-04 RX ADMIN — NIFEDIPINE 60 MG: 60 TABLET, FILM COATED, EXTENDED RELEASE ORAL at 09:02

## 2024-02-04 RX ADMIN — METOPROLOL SUCCINATE 25 MG: 25 TABLET, EXTENDED RELEASE ORAL at 08:02

## 2024-02-04 RX ADMIN — NIFEDIPINE 60 MG: 60 TABLET, FILM COATED, EXTENDED RELEASE ORAL at 08:02

## 2024-02-04 RX ADMIN — HYDRALAZINE HYDROCHLORIDE 50 MG: 50 TABLET ORAL at 09:02

## 2024-02-04 RX ADMIN — OSELTAMIVIR PHOSPHATE 30 MG: 30 CAPSULE ORAL at 08:02

## 2024-02-04 RX ADMIN — HYDRALAZINE HYDROCHLORIDE 50 MG: 50 TABLET ORAL at 04:02

## 2024-02-04 RX ADMIN — METOPROLOL SUCCINATE 25 MG: 25 TABLET, EXTENDED RELEASE ORAL at 09:02

## 2024-02-04 RX ADMIN — HYDRALAZINE HYDROCHLORIDE 50 MG: 50 TABLET ORAL at 08:02

## 2024-02-04 RX ADMIN — FUROSEMIDE 40 MG: 10 INJECTION, SOLUTION INTRAMUSCULAR; INTRAVENOUS at 10:02

## 2024-02-04 RX ADMIN — ASPIRIN 81 MG CHEWABLE TABLET 81 MG: 81 TABLET CHEWABLE at 08:02

## 2024-02-04 RX ADMIN — POTASSIUM CHLORIDE 20 MEQ: 1500 TABLET, EXTENDED RELEASE ORAL at 04:02

## 2024-02-04 RX ADMIN — SPIRONOLACTONE 25 MG: 25 TABLET ORAL at 08:02

## 2024-02-04 RX ADMIN — FUROSEMIDE 40 MG: 10 INJECTION, SOLUTION INTRAMUSCULAR; INTRAVENOUS at 08:02

## 2024-02-04 RX ADMIN — CALCITRIOL CAPSULES 0.25 MCG 0.25 MCG: 0.25 CAPSULE ORAL at 08:02

## 2024-02-04 NOTE — PROGRESS NOTES
[facility]  Hospital Medicine Progress Note        Chief Complaint: Inpatient Follow-up     HPI:   73-year-old female with past medical history of hypertension presents to the emergency room outside facility 02/02/2024 with complaint shortness breath.  Reports that symptoms started 4 days prior.  Associated with a nonproductive cough.  Denies chest pain.  Had some mild nausea emesis.  Also reports increased peripheral edema.  Denies a known history of heart failure.  He was not followed by cardiologist as an.  At outside facility her blood pressure is noted to sustain over 200.  She was transferred to [facility] for higher level of care.  Upon arrival to our ER her blood pressure is 173/121.  She was afebrile.  He was noted to be tachypneic with a respiratory rate up to 34 but sustaining on room air with good oxygen saturations.  Labs showed no leukocytosis.  She was some mild hypokalemia at 3.3.  Sodium elevated at 151.  Creatinine is 4.08.  Labs from about 6 months ago show baseline around 2.7.  She was denies any known kidney disease.  She also tested positive for influenza B. BNP was 2100.  Troponin 0.299.  Showed chest x-ray showed bilateral pulmonary edema.  She was started on IV Lasix.  She was being admitted to the hospitalist group       Interval Hx:  Patient doing okay this morning.  She was resting in bed on room air.  She reports some mild aches and pains but no fevers last night.  Still with a cough but unchanged from yesterday.  Minimal urine output overnight.  On IV Lasix    Objective/physical exam:  General: In no acute distress, afebrile  Chest: Clear to auscultation bilaterally  Heart: RRR, +S1, S2, no appreciable murmur  Abdomen: Soft, nontender, BS +  MSK: Warm, no lower extremity edema, no clubbing or cyanosis  Neurologic: Alert and oriented x4, Cranial nerve II-XII intact, Strength 5/5 in all 4 extremities    VITAL SIGNS: 24 HRS MIN & MAX LAST   Temp  Min:  97.5 °F (36.4 °C)  Max: 98.2 °F (36.8 °C) 97.7 °F (36.5 °C)   BP  Min: 134/92  Max: 195/128 (!) 143/84   Pulse  Min: 83  Max: 100  100   Resp  Min: 17  Max: 25 19   SpO2  Min: 93 %  Max: 100 % (!) 93 %       Recent Labs   Lab 02/02/24 2036 02/03/24  0528   WBC 7.87 5.98   RBC 4.75 4.44   HGB 13.7 13.2   HCT 43.5 40.0   MCV 91.6 90.1   MCH 28.8 29.7   MCHC 31.5* 33.0   RDW 15.6 15.9    190   MPV 10.5* 10.5*       Recent Labs   Lab 02/02/24 2109 02/03/24  0528   * 149*   K 3.3* 3.7   CO2 23 22*   BUN 61.3* 62.4*   CREATININE 4.08* 4.31*   CALCIUM 9.3 9.4   ALBUMIN 3.0* 3.2*   ALKPHOS 79 70   ALT 60* 58*   AST 41* 40*   BILITOT 0.8 0.8          Microbiology Results (last 7 days)       ** No results found for the last 168 hours. **             Radiology:  Echo    Left Ventricle: The left ventricle is normal in size. Moderately   increased wall thickness. Normal wall motion. There is low normal systolic   function with a visually estimated ejection fraction of 50 - 55%. There is   diastolic dysfunction.    Right Ventricle: Normal right ventricular cavity size. Systolic   function is normal.    Left Atrium: Left atrium is moderately dilated.    Aortic Valve: The aortic valve is a trileaflet valve. There is moderate   aortic valve sclerosis. There is moderate aortic regurgitation.    Mitral Valve: There is mild to moderate regurgitation.    Tricuspid Valve: There is mild regurgitation.    IVC/SVC: Intermediate venous pressure at 8 mmHg.    Pericardium: There is a small effusion. No indication of cardiac   tamponade.  US Retroperitoneal Complete  Narrative: EXAMINATION:  US RETROPERITONEAL COMPLETE    CLINICAL HISTORY:  silvia;    TECHNIQUE:  Ultrasound evaluation of the kidneys, region of the ureters, and urinary bladder.    COMPARISON:  Ultrasound 05/04/2022    FINDINGS:  No hydronephrosis. Increased overall renal echogenicity.    Bladder only mildly distended.    Patent superior IVC.  Imaged segments of the  abdominal aorta normal in caliber.    Measurements:    - Right kidney: 9.0 cm in length    - Left kidney: 9.0 cm in length  Impression: No hydronephrosis.  Likely medical renal disease.    Electronically signed by: Carlos Senior  Date:    02/03/2024  Time:    12:09  CT Head Without Contrast  Narrative: EXAMINATION:  CT of the head without contrast    CLINICAL HISTORY:  Confusion, possible stroke    TECHNIQUE:  Routine CT of the head was performed without contrast    Total DLP: 2486.48 mGy.cm    Automatic exposure control was utilized to reduce the patient's dose    COMPARISON:  None    FINDINGS:  There is no acute intracranial hemorrhage, midline shift, mass-effect, or extra-axial collection.  No sulcal effacement.  Gray-white matter differentiation is preserved.  The ventricles are normal in size and configuration for patient's age.  There is moderate patchy and confluent areas of decreased attenuation in the periventricular, deep, and subcortical white matter, while nonspecific, most commonly sequela of chronic microvascular ischemia.  Basal cisterns are patent.  Visualized osseous structures are intact.  There is complete opacification of the right maxillary sinus.  There is mild moderate mucosal thickening of the ethmoid air cells.  There is mild mucosal thickening of the left frontal sinus.  There is bony remodeling of the maxillary sinus suggesting chronic sinus disease.  Mastoid air cells are clear.  Impression: No acute intracranial abnormality.    Chronic changes as above.    Electronically signed by: Gene Willard MD  Date:    02/03/2024  Time:    06:12  X-Ray Chest 1 View  Narrative: EXAMINATION:  Chest one view    CLINICAL HISTORY:  Shortness of breath    COMPARISON:  11/01/2017    FINDINGS:  Cardiac silhouette is enlarged.  There is central vascular congestion with mild perihilar interstitial markings and alveolar ground-glass densities which may reflect mild pulmonary edema.  No visible pneumothorax  or consolidation.  Impression: Cardiac enlargement with central vascular congestion and perihilar interstitial and mild ground-glass opacities, suggesting mild pulmonary edema.    Electronically signed by: Gene Willard MD  Date:    02/03/2024  Time:    06:02      Scheduled Med:   aspirin  81 mg Oral Daily    calcitRIOL  0.25 mcg Oral Daily    furosemide (LASIX) injection  40 mg Intravenous Q12H    hydrALAZINE  50 mg Oral TID    metoprolol succinate  25 mg Oral BID    NIFEdipine  60 mg Oral BID    oseltamivir  30 mg Oral Daily    potassium chloride SA  20 mEq Oral Daily    spironolactone  25 mg Oral Daily        Continuous Infusions:   dilTIAZem Stopped (02/03/24 0315)        PRN Meds:  cloNIDine, hydrALAZINE, melatonin, sodium chloride 0.9%       Assessment/Plan:   Acute heart failure exacerbation   Hypertensive urgency   NSTEMI, type 2 demand ischemia   Influenza B   Acute on chronic kidney disease, unknown stage    Patient was only put out about it under since he was of urine despite IV Lasix yesterday.  Nephrology has been consulted.  Will follow up any further recommendations.  May need to bump her up or try some Bumex.  Cardiology is following.  Considering either nuclear stress test versus left heart catheterization pending on her recovery over the next day or 2.  Her diltiazem has been discontinued.  Now on beta-blocker, nifedipine, and hydralazine.  Blood pressure mildly elevated but overall stable.  Tachypnea has improved.  Will follow up electrolytes and renal function this morning.  Yesterday her creatinine had bumped a little bit.  I suspect this is more ineffective circulating volume rather than dehydration.  Ultrasound of the kidney showed medical renal disease  Will continue with Tamiflu, day 2 of planned 5 days.  From a flu standpoint she was mostly asymptomatic.  Afebrile and no leukocytosis.    Critical care diagnosis: acute systolic heart failure requiring IV diuretics  Critical care  interventions: hands on evaluation, review of labs/radiographs/records and discussions with family  Critical care time spent: >32 minutes        Chavez Miles MD   02/04/2024     All diagnosis and differential diagnosis have been reviewed; assessment and plan has been documented; I have personally reviewed the labs and test results that are presently available; I have reviewed the patients medication list; I have reviewed the consulting providers response and recommendations. I have reviewed or attempted to review medical records based upon their availability    All of the patient's questions have been  addressed and answered. Patient's is agreeable to the above stated plan. I will continue to monitor closely and make adjustments to medical management as needed.  _____________________________________________________________________

## 2024-02-04 NOTE — PT/OT/SLP EVAL
Physical Therapy Evaluation and Discharge Note    Patient Name:  Jesi Cha   MRN:  [id number]    Recommendations:     Discharge therapy intensity: No Therapy Indicated   Discharge Equipment Recommendations: none   Barriers to discharge: None    Assessment:     Jesi Cha is a 73 y.o. female admitted with a medical diagnosis of HTN.  At this time, patient is functioning at their prior level of function and does not require further acute PT services.     Recent Surgery: * No surgery found *      Plan:     During this hospitalization, patient does not require further acute PT services.  Please re-consult if situation changes.      Subjective     Chief Complaint: N/A  Patient/Family Comments/goals: To return home with family.  Pain/Comfort:  Pain Rating 1: 0/10  Pain Rating Post-Intervention 1: 0/10    Patients cultural, spiritual, Anglican conflicts given the current situation: no    Living Environment:  Pt lives at home with her family in a single level home without steps.    Prior to admission, patients level of function was Independent with all ADL's.  Equipment used at home: none.  DME owned (not currently used): rolling walker.  Upon discharge, patient will have assistance from family.    Objective:     Communicated with nurse Rick prior to session.  Patient found supine with PureWick, telemetry, peripheral IV upon PT entry to room.    General Precautions: Standard, droplet    Orthopedic Precautions:N/A   Braces: N/A  Respiratory Status: Nasal cannula, flow 1 L/min  Blood Pressure:     Exams:  Cognitive Exam:  Patient is oriented to Person, Place, Time, and Situation  Sensation:    -       Intact  RUE ROM: WFL  RUE Strength: WFL  LUE ROM: WFL  LUE Strength: WFL  RLE ROM: WFL  RLE Strength: WFL  LLE ROM: WFL  LLE Strength: WFL    Functional Mobility:  Bed Mobility:     Supine to Sit: independence  Transfers:     Sit to Stand:  independence with no AD  Gait: Pt ambulated 200 ft without AD  independently.  Pt ambulated on room air per RN request, tele monitor checked periodically during session, O2 stats at 95% or higher during the session. HR at 100bpm or less during session. Pt tolerated ambulation on room air well.     AM-PAC 6 CLICK MOBILITY  Total Score:        Treatment and Education:    Patient provided with verbal education education regarding PT role/goals/POC, fall prevention, and safety awareness.  Understanding was verbalized.     Patient left supine with all lines intact, call button in reach, nurse notified, and daughter present.    GOALS:   Multidisciplinary Problems       Physical Therapy Goals       Not on file                    History:     Past Medical History:   Diagnosis Date    HTN (hypertension)     Hypokalemia     Obesity, unspecified     Thyroid cyst        Past Surgical History:   Procedure Laterality Date    APPENDECTOMY       SECTION      CHOLECYSTECTOMY         Time Tracking:     PT Received On: 24  PT Start Time: 1349     PT Stop Time: 1405  PT Total Time (min): 16 min     Billable Minutes: Evaluation Low      2024

## 2024-02-04 NOTE — PROGRESS NOTES
[facility] -    Cardiology  Progress Note    Patient Name: Jesi Cha  MRN: [id number]  Admission Date: 2/3/2024  Hospital Length of Stay: 0 days  Code Status: Full Code   Attending Provider: Chavez Miles MD   Consulting Provider: MYRTLE Carranza  Primary Care Physician: Dk Rodriguez MD  Principal Problem:Elevated blood pressure reading    Patient information was obtained from patient, past medical records, and ER records.     Subjective:     Reason for consult: NSTEMI, afib    HPI:   Ms. Cha is a 74 y/o female, known to [facility] through telecardiology consult only, who presented to [place] [facility] with c/o SOB and peripheral edema. /150 on arrival. She reports she stopped taking her medications.  Lab significant for BUN/creatinine 61.3/4.08, AST/ALT 40/58, BNP 2138, troponin 0.299, Influenza B+. Negative COVID. CXR consistent with pulmonary edema. CT head negative for acute intracranial abnormality. EKG revealing SR with inferior and anterolateral T wave inversions. She was treated with   A cardizem drip and transferred to [facility] for admit to hospital medicine. [facility] has been consulted for new Afib, NSTEMI. No evidence of Afib scanned to chart.     Hospital Course:  24: Denies CP/SOB. Troponin down-trending. Renal indices remain elevated. Good UOP. (The 1 liter she put out in ED was not charted). 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 Moderately increased wall thickness. Normal wall motion. There is low normal systolic function with a visually estimated ejection fraction of 50 - 55%. There is diastolic dysfunction.    Right Ventricle: Normal right ventricular cavity size.  Systolic function is normal.    Left Atrium: Left atrium is moderately dilated.    Aortic Valve: The aortic valve is a trileaflet valve. There is moderate aortic valve sclerosis. There is moderate aortic regurgitation.    Mitral Valve: There is mild to moderate regurgitation.    Tricuspid Valve: There is mild regurgitation.    IVC/SVC: Intermediate venous pressure at 8 mmHg.    Pericardium: There is a small effusion. No indication of cardiac tamponade.    TTE 03.21.22:  Normal LV systolic function, LVEF 60-65%.  Moderate concentric LVH.  AV sclerosis without stenosis.  Mild Aortic regurgitation.  Moderate posteriorly directed jet of mitral regurgitation. If clinically indicated, this can be further assessed and quantified by ANGELIQUE  and/or Cardiac MRI.      No current facility-administered medications on file prior to encounter.     Current Outpatient Medications on File Prior to Encounter   Medication Sig    aspirin 81 mg Cap Take 81 mg by mouth once daily at 6am.    calcitRIOL (ROCALTROL) 0.25 MCG Cap Take 1 capsule (0.25 mcg total) by mouth once daily.    hydrALAZINE (APRESOLINE) 50 MG tablet Take 1 tablet (50 mg total) by mouth 3 (three) times daily.    losartan (COZAAR) 50 MG tablet Take 1 tablet (50 mg total) by mouth once daily.    metoprolol succinate (TOPROL-XL) 25 MG 24 hr tablet Take 1 tablet (25 mg total) by mouth 2 (two) times daily.    NIFEdipine (PROCARDIA-XL) 60 MG (OSM) 24 hr tablet Take 1 tablet (60 mg total) by mouth 2 (two) times a day.    nitroGLYCERIN (NITROSTAT) 0.4 MG SL tablet Place 0.4 mg under the tongue.    potassium chloride SA (K-DUR,KLOR-CON) 20 MEQ tablet Take 20 mEq by mouth Daily.    spironolactone (ALDACTONE) 25 MG tablet Take 1 tablet (25 mg total) by mouth once daily.         Review of Systems   Respiratory:  Negative for cough and shortness of breath.    Cardiovascular:  Negative for chest pain.   Gastrointestinal: Negative.    Genitourinary: Negative.    Neurological: Negative.     Psychiatric/Behavioral: Negative.         Objective:     Vital Signs (Most Recent):  Temp: 97.9 °F (36.6 °C) (02/04/24 0800)  Pulse: 100 (02/04/24 0800)  Resp: 18 (02/04/24 0800)  BP: (!) 173/96 (02/04/24 0800)  SpO2: 97 % (02/04/24 0800) Vital Signs (24h Range):  Temp:  [97.7 °F (36.5 °C)-98.2 °F (36.8 °C)] 97.9 °F (36.6 °C)  Pulse:  [] 100  Resp:  [18-25] 18  SpO2:  [93 %-100 %] 97 %  BP: (134-194)/() 173/96     Weight: 86.2 kg (190 lb)  Body mass index is 33.67 kg/m².    SpO2: 97 %         Intake/Output Summary (Last 24 hours) at 2/4/2024 1027  Last data filed at 2/4/2024 0025  Gross per 24 hour   Intake --   Output 800 ml   Net -800 ml       Lines/Drains/Airways       Peripheral Intravenous Line  Duration                  Peripheral IV - Single Lumen 02/03/24 0910 18 G Anterior;Distal;Right Forearm 1 day                    Significant Labs:  Recent Results (from the past 72 hour(s))   Troponin ISTAT    Collection Time: 02/02/24  8:35 PM   Result Value Ref Range    POC Cardiac Troponin I 0.12 (H) 0.00 - 0.08 ng/mL    Sample unknown    BNP    Collection Time: 02/02/24  8:36 PM   Result Value Ref Range    Natriuretic Peptide 2,138.0 (H) <=100.0 pg/mL   CBC with Differential    Collection Time: 02/02/24  8:36 PM   Result Value Ref Range    WBC 7.87 4.50 - 11.50 x10(3)/mcL    RBC 4.75 4.20 - 5.40 x10(6)/mcL    Hgb 13.7 12.0 - 16.0 g/dL    Hct 43.5 37.0 - 47.0 %    MCV 91.6 80.0 - 94.0 fL    MCH 28.8 27.0 - 31.0 pg    MCHC 31.5 (L) 33.0 - 36.0 g/dL    RDW 15.6 11.5 - 17.0 %    Platelet 209 130 - 400 x10(3)/mcL    MPV 10.5 (H) 7.4 - 10.4 fL    Neut % 51.8 %    Lymph % 34.4 %    Mono % 11.2 %    Eos % 2.2 %    Basophil % 0.3 %    Lymph # 2.71 0.6 - 4.6 x10(3)/mcL    Neut # 4.08 2.1 - 9.2 x10(3)/mcL    Mono # 0.88 0.1 - 1.3 x10(3)/mcL    Eos # 0.17 0 - 0.9 x10(3)/mcL    Baso # 0.02 <=0.2 x10(3)/mcL    IG# 0.01 0 - 0.04 x10(3)/mcL    IG% 0.1 %   COVID/RSV/FLU A&B PCR    Collection Time: 02/02/24  8:54 PM    Result Value Ref Range    Influenza A PCR Not Detected Not Detected    Influenza B PCR Detected (A) Not Detected    Respiratory Syncytial Virus PCR Not Detected Not Detected    SARS-CoV-2 PCR Not Detected Not Detected, Negative   Comprehensive metabolic panel    Collection Time: 02/02/24  9:09 PM   Result Value Ref Range    Sodium Level 151 (H) 136 - 145 mmol/L    Potassium Level 3.3 (L) 3.5 - 5.1 mmol/L    Chloride 113 (H) 98 - 107 mmol/L    Carbon Dioxide 23 23 - 31 mmol/L    Glucose Level 110 82 - 115 mg/dL    Blood Urea Nitrogen 61.3 (H) 9.8 - 20.1 mg/dL    Creatinine 4.08 (H) 0.55 - 1.02 mg/dL    Calcium Level Total 9.3 8.4 - 10.2 mg/dL    Protein Total 6.4 5.8 - 7.6 gm/dL    Albumin Level 3.0 (L) 3.4 - 4.8 g/dL    Globulin 3.4 2.4 - 3.5 gm/dL    Albumin/Globulin Ratio 0.9 (L) 1.1 - 2.0 ratio    Bilirubin Total 0.8 <=1.5 mg/dL    Alkaline Phosphatase 79 40 - 150 unit/L    Alanine Aminotransferase 60 (H) 0 - 55 unit/L    Aspartate Aminotransferase 41 (H) 5 - 34 unit/L    eGFR 11 mls/min/1.73/m2   Troponin ISTAT    Collection Time: 02/02/24  9:12 PM   Result Value Ref Range    POC Cardiac Troponin I 0.14 (H) 0.00 - 0.08 ng/mL    Sample unknown    Urinalysis, Reflex to Urine Culture    Collection Time: 02/02/24  9:29 PM    Specimen: Urine   Result Value Ref Range    Color, UA Yellow Yellow, Light-Yellow, Dark Yellow, Zoila, Straw    Appearance, UA Clear Clear    Specific Gravity, UA 1.025 1.005 - 1.030    pH, UA 6.0 5.0 - 8.5    Protein, UA >=300 (A) Negative    Glucose, UA Negative Negative, Normal    Ketones, UA Negative Negative    Blood, UA Small (A) Negative    Bilirubin, UA Negative Negative    Urobilinogen, UA 0.2 0.2, 1.0, Normal    Nitrites, UA Negative Negative    Leukocyte Esterase, UA Negative Negative   Urinalysis, Microscopic    Collection Time: 02/02/24  9:29 PM   Result Value Ref Range    Bacteria, UA None Seen None Seen, Rare, Occasional /HPF    Hyaline Casts, UA Few (A) None Seen /LPF     Granular Casts, UA Occ (A) None Seen /LPF    WBC Casts, UA Occ (A) None Seen /LPF    RBC, UA 0-2 None Seen, 0-2, 3-5, 0-5 /HPF    WBC, UA 0-2 None Seen, 0-2, 3-5, 0-5 /HPF    Squamous Epithelial Cells, UA Occ None Seen, Rare, Occasional, Occ /HPF   POCT glucose    Collection Time: 02/02/24 11:40 PM   Result Value Ref Range    POCT Glucose 132 (H) 70 - 110 mg/dL   Troponin I    Collection Time: 02/03/24  5:28 AM   Result Value Ref Range    Troponin-I 0.299 (H) 0.000 - 0.045 ng/mL   Comprehensive metabolic panel    Collection Time: 02/03/24  5:28 AM   Result Value Ref Range    Sodium Level 149 (H) 136 - 145 mmol/L    Potassium Level 3.7 3.5 - 5.1 mmol/L    Chloride 114 (H) 98 - 107 mmol/L    Carbon Dioxide 22 (L) 23 - 31 mmol/L    Glucose Level 125 (H) 82 - 115 mg/dL    Blood Urea Nitrogen 62.4 (H) 9.8 - 20.1 mg/dL    Creatinine 4.31 (H) 0.55 - 1.02 mg/dL    Calcium Level Total 9.4 8.4 - 10.2 mg/dL    Protein Total 5.9 5.8 - 7.6 gm/dL    Albumin Level 3.2 (L) 3.4 - 4.8 g/dL    Globulin 2.7 2.4 - 3.5 gm/dL    Albumin/Globulin Ratio 1.2 1.1 - 2.0 ratio    Bilirubin Total 0.8 <=1.5 mg/dL    Alkaline Phosphatase 70 40 - 150 unit/L    Alanine Aminotransferase 58 (H) 0 - 55 unit/L    Aspartate Aminotransferase 40 (H) 5 - 34 unit/L    eGFR 10 mls/min/1.73/m2   CBC with Differential    Collection Time: 02/03/24  5:28 AM   Result Value Ref Range    WBC 5.98 4.50 - 11.50 x10(3)/mcL    RBC 4.44 4.20 - 5.40 x10(6)/mcL    Hgb 13.2 12.0 - 16.0 g/dL    Hct 40.0 37.0 - 47.0 %    MCV 90.1 80.0 - 94.0 fL    MCH 29.7 27.0 - 31.0 pg    MCHC 33.0 33.0 - 36.0 g/dL    RDW 15.9 11.5 - 17.0 %    Platelet 190 130 - 400 x10(3)/mcL    MPV 10.5 (H) 7.4 - 10.4 fL    Neut % 67.2 %    Lymph % 21.6 %    Mono % 9.0 %    Eos % 1.2 %    Basophil % 0.5 %    Lymph # 1.29 0.6 - 4.6 x10(3)/mcL    Neut # 4.02 2.1 - 9.2 x10(3)/mcL    Mono # 0.54 0.1 - 1.3 x10(3)/mcL    Eos # 0.07 0 - 0.9 x10(3)/mcL    Baso # 0.03 <=0.2 x10(3)/mcL    IG# 0.03 0 - 0.04  x10(3)/mcL    IG% 0.5 %    NRBC% 0.0 %   Hemoglobin A1C    Collection Time: 02/03/24  5:28 AM   Result Value Ref Range    Hemoglobin A1c 5.3 <=7.0 %    Estimated Average Glucose 105.4 mg/dL   Lipid Panel    Collection Time: 02/03/24  5:28 AM   Result Value Ref Range    Cholesterol Total 171 <=200 mg/dL    HDL Cholesterol 71 (H) 35 - 60 mg/dL    Triglyceride 47 37 - 140 mg/dL    Cholesterol/HDL Ratio 2 0 - 5    Very Low Density Lipoprotein 9     LDL Cholesterol 91.00 50.00 - 140.00 mg/dL   Troponin I    Collection Time: 02/03/24 10:54 AM   Result Value Ref Range    Troponin-I 0.206 (H) 0.000 - 0.045 ng/mL   Echo    Collection Time: 02/03/24  1:02 PM   Result Value Ref Range    BSA 1.96 m2    Lopez's Biplane MOD Ejection Fraction 47 %    LVOT stroke volume 50.87 cm3    LVIDd 4.49 3.5 - 6.0 cm    LV Systolic Volume 54.10 mL    LV Systolic Volume Index 28.6 mL/m2    LVIDs 3.59 2.1 - 4.0 cm    LV Diastolic Volume 92.00 mL    LV Diastolic Volume Index 48.68 mL/m2    IVS 1.49 (A) 0.6 - 1.1 cm    LVOT diameter 2.00 cm    LVOT area 3.1 cm2    FS 20 (A) 28 - 44 %    Left Ventricle Relative Wall Thickness 0.68 cm    Posterior Wall 1.52 (A) 0.6 - 1.1 cm    LV mass 276.29 g    LV Mass Index 146 g/m2    MV Peak E Richard 0.91 m/s    TDI LATERAL 0.11 m/s    TDI SEPTAL 0.07 m/s    E/E' ratio 10.11 m/s    MV Peak A Richard 1.02 m/s    TR Max Richard 2.30 m/s    E/A ratio 0.89     LV SEPTAL E/E' RATIO 13.00 m/s    LV LATERAL E/E' RATIO 8.27 m/s    LVOT peak richard 0.94 m/s    Left Ventricular Outflow Tract Mean Velocity 0.70 cm/s    Left Ventricular Outflow Tract Mean Gradient 2.00 mmHg    TAPSE 1.88 cm    LA size 5.10 cm    LA volume (mod) 121.00 cm3    LA Volume Index (Mod) 64.0 mL/m2    AV regurgitation pressure 1/2 time 567 ms    AR Max Richard 4.89 m/s    Vn Nyquist MS 0.39 m/s    AV mean gradient 8 mmHg    AV peak gradient 13 mmHg    Ao peak richard 1.83 m/s    Ao VTI 28.90 cm    LVOT peak VTI 16.20 cm    AV valve area 1.76 cm²    AV Velocity Ratio  0.51     AV index (prosthetic) 0.56     LAXMI by Velocity Ratio 1.61 cm²    Radius 0.50 cm    Vn Nyquist 0.39 m/s    Mr max may 6.11 m/s    MR PISA EROA 0.10 cm2    MV mean gradient 2 mmHg    MV peak gradient 4 mmHg    MV stenosis pressure 1/2 time 87.00 ms    MV valve area p 1/2 method 2.53 cm2    MV valve area by continuity eq 1.99 cm2    MV VTI 25.6 cm    Triscuspid Valve Regurgitation Peak Gradient 21 mmHg    PV PEAK VELOCITY 1.01 m/s    PV peak gradient 4 mmHg    Mean e' 0.09 m/s    ZLVIDS 0.76     ZLVIDD -1.64     TV resting pulmonary artery pressure 29 mmHg    RV TB RVSP 10 mmHg    Est. RA pres 8 mmHg   Basic Metabolic Panel    Collection Time: 02/04/24  8:43 AM   Result Value Ref Range    Sodium Level 147 (H) 136 - 145 mmol/L    Potassium Level 3.7 3.5 - 5.1 mmol/L    Chloride 112 (H) 98 - 107 mmol/L    Carbon Dioxide 23 23 - 31 mmol/L    Glucose Level 92 82 - 115 mg/dL    Blood Urea Nitrogen 57.9 (H) 9.8 - 20.1 mg/dL    Creatinine 4.39 (H) 0.55 - 1.02 mg/dL    BUN/Creatinine Ratio 13     Calcium Level Total 9.1 8.4 - 10.2 mg/dL    Anion Gap 12.0 mEq/L    eGFR 10 mls/min/1.73/m2   Magnesium    Collection Time: 02/04/24  8:43 AM   Result Value Ref Range    Magnesium Level 2.30 1.60 - 2.60 mg/dL   CBC with Differential    Collection Time: 02/04/24  8:43 AM   Result Value Ref Range    WBC 6.28 4.50 - 11.50 x10(3)/mcL    RBC 4.49 4.20 - 5.40 x10(6)/mcL    Hgb 13.3 12.0 - 16.0 g/dL    Hct 41.6 37.0 - 47.0 %    MCV 92.7 80.0 - 94.0 fL    MCH 29.6 27.0 - 31.0 pg    MCHC 32.0 (L) 33.0 - 36.0 g/dL    RDW 16.0 11.5 - 17.0 %    Platelet 198 130 - 400 x10(3)/mcL    MPV 10.3 7.4 - 10.4 fL    Neut % 64.1 %    Lymph % 22.8 %    Mono % 10.4 %    Eos % 1.9 %    Basophil % 0.5 %    Lymph # 1.43 0.6 - 4.6 x10(3)/mcL    Neut # 4.03 2.1 - 9.2 x10(3)/mcL    Mono # 0.65 0.1 - 1.3 x10(3)/mcL    Eos # 0.12 0 - 0.9 x10(3)/mcL    Baso # 0.03 <=0.2 x10(3)/mcL    IG# 0.02 0 - 0.04 x10(3)/mcL    IG% 0.3 %    NRBC% 0.0 %       Significant  Imaging:  Imaging Results              US Retroperitoneal Complete (Final result)  Result time 02/03/24 12:09:11      Final result by Carlos Senior MD (02/03/24 12:09:11)                   Impression:      No hydronephrosis.  Likely medical renal disease.      Electronically signed by: Carlos Senior  Date:    02/03/2024  Time:    12:09               Narrative:    EXAMINATION:  US RETROPERITONEAL COMPLETE    CLINICAL HISTORY:  silvia;    TECHNIQUE:  Ultrasound evaluation of the kidneys, region of the ureters, and urinary bladder.    COMPARISON:  Ultrasound 05/04/2022    FINDINGS:  No hydronephrosis. Increased overall renal echogenicity.    Bladder only mildly distended.    Patent superior IVC.  Imaged segments of the abdominal aorta normal in caliber.    Measurements:    - Right kidney: 9.0 cm in length    - Left kidney: 9.0 cm in length                                    Telemetry: SR      Physical Exam  HENT:      Mouth/Throat:      Mouth: Mucous membranes are moist.   Cardiovascular:      Rate and Rhythm: Normal rate and regular rhythm.      Pulses: Normal pulses.      Heart sounds: Murmur heard.   Pulmonary:      Effort: Pulmonary effort is normal.      Breath sounds: No rales.   Abdominal:      Palpations: Abdomen is soft.   Musculoskeletal:         General: Normal range of motion.   Skin:     General: Skin is warm.      Capillary Refill: Capillary refill takes less than 2 seconds.   Neurological:      Mental Status: She is alert and oriented to person, place, and time.         Home Medications:   No current facility-administered medications on file prior to encounter.     Current Outpatient Medications on File Prior to Encounter   Medication Sig Dispense Refill    aspirin 81 mg Cap Take 81 mg by mouth once daily at 6am.      calcitRIOL (ROCALTROL) 0.25 MCG Cap Take 1 capsule (0.25 mcg total) by mouth once daily. 30 capsule 11    hydrALAZINE (APRESOLINE) 50 MG tablet Take 1 tablet (50 mg total) by mouth 3  (three) times daily. 270 tablet 3    losartan (COZAAR) 50 MG tablet Take 1 tablet (50 mg total) by mouth once daily. 90 tablet 3    metoprolol succinate (TOPROL-XL) 25 MG 24 hr tablet Take 1 tablet (25 mg total) by mouth 2 (two) times daily. 180 tablet 3    NIFEdipine (PROCARDIA-XL) 60 MG (OSM) 24 hr tablet Take 1 tablet (60 mg total) by mouth 2 (two) times a day. 180 tablet 3    nitroGLYCERIN (NITROSTAT) 0.4 MG SL tablet Place 0.4 mg under the tongue.      potassium chloride SA (K-DUR,KLOR-CON) 20 MEQ tablet Take 20 mEq by mouth Daily.      spironolactone (ALDACTONE) 25 MG tablet Take 1 tablet (25 mg total) by mouth once daily. 90 tablet 3       Current Inpatient Medications:    Current Facility-Administered Medications:     aspirin chewable tablet 81 mg, 81 mg, Oral, Daily, Chavez Miles MD, 81 mg at 02/04/24 0830    calcitRIOL capsule 0.25 mcg, 0.25 mcg, Oral, Daily, Chavez Miles MD, 0.25 mcg at 02/04/24 0830    cloNIDine tablet 0.1 mg, 0.1 mg, Oral, Q6H PRN, Clarke Ely MD    diltiaZEM 125 mg in dextrose 5 % 125 mL IVPB (ready to mix) (titrating), 0-15 mg/hr, Intravenous, Continuous, Clarke Ely MD, Held at 02/03/24 0315    furosemide injection 40 mg, 40 mg, Intravenous, Q12H, Chavez Miles MD, 40 mg at 02/04/24 0830    hydrALAZINE injection 10 mg, 10 mg, Intravenous, Q2H PRN, Clarke Ely MD, 10 mg at 02/03/24 0744    hydrALAZINE tablet 50 mg, 50 mg, Oral, TID, Chavez Miles MD, 50 mg at 02/04/24 0830    melatonin tablet 6 mg, 6 mg, Oral, Nightly PRN, Clarke Ely MD    metoprolol succinate (TOPROL-XL) 24 hr tablet 25 mg, 25 mg, Oral, BID, Chavez Miles MD, 25 mg at 02/04/24 0830    NIFEdipine 24 hr tablet 60 mg, 60 mg, Oral, BID, Chavez Miles MD, 60 mg at 02/04/24 0830    oseltamivir capsule 30 mg, 30 mg, Oral, Daily, Vanda Alfaro, [facility], 30 mg at 02/04/24 0835    potassium chloride SA CR tablet 20 mEq, 20 mEq, Oral, Daily, Chavez Miles MD, 20 mEq at  02/03/24 1739    sodium chloride 0.9% flush 10 mL, 10 mL, Intravenous, PRN, Clarke Ely MD    spironolactone tablet 25 mg, 25 mg, Oral, Daily, Chavez Miles MD, 25 mg at 02/04/24 0830         VTE Risk Mitigation (From admission, onward)           Ordered     IP VTE HIGH RISK PATIENT  Once         02/03/24 0512     Place sequential compression device  Until discontinued         02/03/24 0512                    Assessment:   Hypertensive Urgency  --BP remains above goal  NSTEMI, likely type II due to hypertensive urgency and CKD; however she has multiple RF for CAD  --Troponin 0.299-0.206  CKD3b/4  --BUN/creatinine 61.3/4.08 on admit  VHD- moderate MR per TTE 03/2022  Questionable Afib??  --none since on tele review  AMS  --CT head negative for acute intracranial abnormality  --resolved  Influenza B +   --started on tamiflu  No hx of GIB       (2.3.24)  A1c 5.3 (2.3.24)  Plan:   Recommend nephrology consult due to known CKD. Diuretics have been initiated per primary   Will consider outpatient stress vs LHC. Patient will need nephrology clearance prior to angiogram due to CKD 4  Start weight based heparin drip if ok with primary for medical tx of NSTEMI  Continue BB and aspirin.       Amy Gonzalez, MYRTLE  Cardiology  [facility]   02/04/2024

## 2024-02-04 NOTE — CONSULTS
[facility] - [facility] Telemetry  Nephrology  Consult Note    Patient Name: Jesi Cha  MRN: [id number]  Admission Date: 2/3/2024  Hospital Length of Stay: 0 days  Attending Provider: Chavez Miles MD   Primary Care Physician: Dk Rodriguez MD  Principal Problem:Elevated blood pressure reading    Consults  Subjective:     HPI: This is a 73-year-old AAF with CKD V, HTN and obesity who presented to the ED on 2/3 with SOB, non productive cough and chest pain. Tested positive for Flu B. CXR consisted with bilateral pulmonary edema. Pt is being treated with twice daily IV Lasix with good response.     Admit workup significant for hypernatremia and renal insufficiency with GFR 11. Electrolytes are improving, sodium now 147. She has been seen nephrology one year ago and was told she needed to start long term dialysis but patient opted out at that time. She has dysgeusia. Denies poor appetite, nausea and vomiting. She has breakfast tray with less than 50% eaten in front of her. She endorses weight loss due to dieting. Endorses daytime sleepiness as well.  at bedside.     Past Medical History:   Diagnosis Date    HTN (hypertension)     Hypokalemia     Obesity, unspecified     Thyroid cyst        Past Surgical History:   Procedure Laterality Date    APPENDECTOMY       SECTION      CHOLECYSTECTOMY         Review of patient's allergies indicates:   Allergen Reactions    Codeine      Current Facility-Administered Medications   Medication Frequency    aspirin chewable tablet 81 mg Daily    calcitRIOL capsule 0.25 mcg Daily    cloNIDine tablet 0.1 mg Q6H PRN    diltiaZEM 125 mg in dextrose 5 % 125 mL IVPB (ready to mix) (titrating) Continuous    furosemide injection 40 mg Q12H    hydrALAZINE injection 10 mg Q2H PRN    hydrALAZINE tablet 50 mg TID    melatonin tablet 6 mg Nightly PRN    metoprolol succinate (TOPROL-XL) 24 hr tablet 25 mg BID    NIFEdipine 24 hr tablet 60 mg BID     oseltamivir capsule 30 mg Daily    potassium chloride SA CR tablet 20 mEq Daily    sodium chloride 0.9% flush 10 mL PRN    spironolactone tablet 25 mg Daily     Family History       Problem Relation (Age of Onset)    Arthritis Mother          Tobacco Use    Smoking status: Never     Passive exposure: Never    Smokeless tobacco: Never   Substance and Sexual Activity    Alcohol use: Never    Drug use: Never    Sexual activity: Yes     Review of Systems   Constitutional:  Negative for chills, fever and unexpected weight change.   Respiratory:  Positive for cough and shortness of breath.    Gastrointestinal: Negative.    Genitourinary: Negative.    Musculoskeletal: Negative.    Psychiatric/Behavioral: Negative.       Objective:     Vital Signs (Most Recent):  Temp: 97.9 °F (36.6 °C) (02/04/24 0800)  Pulse: 100 (02/04/24 0800)  Resp: 18 (02/04/24 0800)  BP: (!) 173/96 (02/04/24 0800)  SpO2: 97 % (02/04/24 0800) Vital Signs (24h Range):  Temp:  [97.7 °F (36.5 °C)-98.2 °F (36.8 °C)] 97.9 °F (36.6 °C)  Pulse:  [] 100  Resp:  [18-25] 18  SpO2:  [93 %-100 %] 97 %  BP: (134-194)/() 173/96     Weight: 86.2 kg (190 lb) (02/03/24 1942)  Body mass index is 33.67 kg/m².  Body surface area is 1.96 meters squared.    I/O last 3 completed shifts:  In: -   Out: 800 [Urine:800]    Physical Exam  Constitutional:       General: She is not in acute distress.  HENT:      Head: Atraumatic.      Mouth/Throat:      Mouth: Mucous membranes are moist.   Eyes:      Extraocular Movements: Extraocular movements intact.      Conjunctiva/sclera: Conjunctivae normal.   Cardiovascular:      Rate and Rhythm: Normal rate and regular rhythm.   Pulmonary:      Effort: Pulmonary effort is normal.      Breath sounds: Normal breath sounds.      Comments: NC  Abdominal:      General: There is no distension.      Palpations: Abdomen is soft.   Genitourinary:     Comments: Clear light yellow urine in external catheter collection chamber  "  Musculoskeletal:         General: No swelling.      Cervical back: Neck supple.   Skin:     General: Skin is warm.   Neurological:      General: No focal deficit present.      Mental Status: She is alert and oriented to person, place, and time.         Significant Labs:  BMP:   Recent Labs   Lab 02/04/24  0843   *   K 3.7   CO2 23   BUN 57.9*   CREATININE 4.39*   CALCIUM 9.1   MG 2.30     CBC:   Recent Labs   Lab 02/04/24  0843   WBC 6.28   RBC 4.49   HGB 13.3   HCT 41.6      MCV 92.7   MCH 29.6   MCHC 32.0*     Microbiology Results (last 7 days)       ** No results found for the last 168 hours. **          Specimen (24h ago, onward)      None          PTH: No results for input(s): "PTH" in the last 168 hours.  TSH: No results for input(s): "TSH" in the last 168 hours.  Recent Labs   Lab 02/02/24 2129   PROTEINUA >=300*   BACTERIA None Seen   LEUKOCYTESUR Negative   UROBILINOGEN 0.2       Significant Imaging:  US Retroperitoneal Complete [[id number]] Resulted: 02/03/24 1209   Order Status: Completed Updated: 02/03/24 1211   Narrative:     EXAMINATION:  US RETROPERITONEAL COMPLETE    CLINICAL HISTORY:  silvia;    TECHNIQUE:  Ultrasound evaluation of the kidneys, region of the ureters, and urinary bladder.    COMPARISON:  Ultrasound 05/04/2022    FINDINGS:  No hydronephrosis. Increased overall renal echogenicity.    Bladder only mildly distended.    Patent superior IVC.  Imaged segments of the abdominal aorta normal in caliber.    Measurements:    - Right kidney: 9.0 cm in length    - Left kidney: 9.0 cm in length   Impression:       No hydronephrosis.  Likely medical renal disease.      Electronically signed by: Carlos Senior  Date: 02/03/2024  Time: 12:09       Assessment/Plan:     CKD V  Influenza B positive   Hypernatremia   Pulmonary edema per CXR    -Discussed need for hemodialysis initiation with patient. Verbalized understanding. No emergent indication for dialysis initiation today. She will " dicussed with her  and we will talk again in the morning.   -For now continue twice daily Lasix and repeat BMP tomorrow     TAY Loza  Nephrology  [facility] - [address] [place] [address]

## 2024-02-05 LAB
APTT PPP: 65.5 SECONDS (ref 23.2–33.7)
APTT PPP: 66.5 SECONDS (ref 23.2–33.7)
APTT PPP: 81.1 SECONDS (ref 23.2–33.7)
BASOPHILS # BLD AUTO: 0.02 X10(3)/MCL
BASOPHILS NFR BLD AUTO: 0.3 %
EOSINOPHIL # BLD AUTO: 0.17 X10(3)/MCL (ref 0–0.9)
EOSINOPHIL NFR BLD AUTO: 2.4 %
ERYTHROCYTE [DISTWIDTH] IN BLOOD BY AUTOMATED COUNT: 16.1 % (ref 11.5–17)
HCT VFR BLD AUTO: 39.3 % (ref 37–47)
HGB BLD-MCNC: 12.3 G/DL (ref 12–16)
IMM GRANULOCYTES # BLD AUTO: 0.03 X10(3)/MCL (ref 0–0.04)
IMM GRANULOCYTES NFR BLD AUTO: 0.4 %
LYMPHOCYTES # BLD AUTO: 1.79 X10(3)/MCL (ref 0.6–4.6)
LYMPHOCYTES NFR BLD AUTO: 25.2 %
MAGNESIUM SERPL-MCNC: 2.2 MG/DL (ref 1.6–2.6)
MCH RBC QN AUTO: 29.4 PG (ref 27–31)
MCHC RBC AUTO-ENTMCNC: 31.3 G/DL (ref 33–36)
MCV RBC AUTO: 93.8 FL (ref 80–94)
MONOCYTES # BLD AUTO: 0.8 X10(3)/MCL (ref 0.1–1.3)
MONOCYTES NFR BLD AUTO: 11.3 %
NEUTROPHILS # BLD AUTO: 4.28 X10(3)/MCL (ref 2.1–9.2)
NEUTROPHILS NFR BLD AUTO: 60.4 %
NRBC BLD AUTO-RTO: 0 %
PLATELET # BLD AUTO: 193 X10(3)/MCL (ref 130–400)
PMV BLD AUTO: 10.6 FL (ref 7.4–10.4)
POTASSIUM SERPL-SCNC: 3.9 MMOL/L (ref 3.5–5.1)
RBC # BLD AUTO: 4.19 X10(6)/MCL (ref 4.2–5.4)
WBC # SPEC AUTO: 7.09 X10(3)/MCL (ref 4.5–11.5)

## 2024-02-05 PROCEDURE — 25000003 PHARM REV CODE 250: Performed by: INTERNAL MEDICINE

## 2024-02-05 PROCEDURE — 63600175 PHARM REV CODE 636 W HCPCS: Performed by: NURSE PRACTITIONER

## 2024-02-05 PROCEDURE — 25000003 PHARM REV CODE 250: Performed by: NURSE PRACTITIONER

## 2024-02-05 PROCEDURE — 99222 1ST HOSP IP/OBS MODERATE 55: CPT | Mod: ,,, | Performed by: INTERNAL MEDICINE

## 2024-02-05 PROCEDURE — 97165 OT EVAL LOW COMPLEX 30 MIN: CPT

## 2024-02-05 PROCEDURE — 84244 ASSAY OF RENIN: CPT | Performed by: INTERNAL MEDICINE

## 2024-02-05 PROCEDURE — 25000003 PHARM REV CODE 250: Performed by: HOSPITALIST

## 2024-02-05 PROCEDURE — 85730 THROMBOPLASTIN TIME PARTIAL: CPT | Performed by: HOSPITALIST

## 2024-02-05 PROCEDURE — 85025 COMPLETE CBC W/AUTO DIFF WBC: CPT | Performed by: NURSE PRACTITIONER

## 2024-02-05 PROCEDURE — 84132 ASSAY OF SERUM POTASSIUM: CPT | Performed by: NURSE PRACTITIONER

## 2024-02-05 PROCEDURE — 27000207 HC ISOLATION

## 2024-02-05 PROCEDURE — 82088 ASSAY OF ALDOSTERONE: CPT | Performed by: INTERNAL MEDICINE

## 2024-02-05 PROCEDURE — 85730 THROMBOPLASTIN TIME PARTIAL: CPT | Performed by: NURSE PRACTITIONER

## 2024-02-05 PROCEDURE — 83735 ASSAY OF MAGNESIUM: CPT | Performed by: NURSE PRACTITIONER

## 2024-02-05 PROCEDURE — 21400001 HC TELEMETRY ROOM

## 2024-02-05 PROCEDURE — 63600175 PHARM REV CODE 636 W HCPCS: Performed by: HOSPITALIST

## 2024-02-05 RX ORDER — CLONIDINE HYDROCHLORIDE 0.2 MG/1
0.2 TABLET ORAL 2 TIMES DAILY
Status: DISCONTINUED | OUTPATIENT
Start: 2024-02-05 | End: 2024-02-06 | Stop reason: HOSPADM

## 2024-02-05 RX ADMIN — HYDRALAZINE HYDROCHLORIDE 50 MG: 50 TABLET ORAL at 08:02

## 2024-02-05 RX ADMIN — HEPARIN SODIUM 14 UNITS/KG/HR: 10000 INJECTION, SOLUTION INTRAVENOUS at 12:02

## 2024-02-05 RX ADMIN — FUROSEMIDE 40 MG: 10 INJECTION, SOLUTION INTRAMUSCULAR; INTRAVENOUS at 09:02

## 2024-02-05 RX ADMIN — CALCITRIOL CAPSULES 0.25 MCG 0.25 MCG: 0.25 CAPSULE ORAL at 09:02

## 2024-02-05 RX ADMIN — METOPROLOL SUCCINATE 25 MG: 25 TABLET, EXTENDED RELEASE ORAL at 09:02

## 2024-02-05 RX ADMIN — NIFEDIPINE 60 MG: 60 TABLET, FILM COATED, EXTENDED RELEASE ORAL at 08:02

## 2024-02-05 RX ADMIN — HYDRALAZINE HYDROCHLORIDE 50 MG: 50 TABLET ORAL at 04:02

## 2024-02-05 RX ADMIN — CLONIDINE HYDROCHLORIDE 0.2 MG: 0.2 TABLET ORAL at 08:02

## 2024-02-05 RX ADMIN — NIFEDIPINE 60 MG: 60 TABLET, FILM COATED, EXTENDED RELEASE ORAL at 09:02

## 2024-02-05 RX ADMIN — METOPROLOL SUCCINATE 25 MG: 25 TABLET, EXTENDED RELEASE ORAL at 08:02

## 2024-02-05 RX ADMIN — CLONIDINE HYDROCHLORIDE 0.2 MG: 0.2 TABLET ORAL at 09:02

## 2024-02-05 RX ADMIN — SPIRONOLACTONE 25 MG: 25 TABLET ORAL at 09:02

## 2024-02-05 RX ADMIN — HYDRALAZINE HYDROCHLORIDE 50 MG: 50 TABLET ORAL at 09:02

## 2024-02-05 RX ADMIN — CLONIDINE HYDROCHLORIDE 0.1 MG: 0.1 TABLET ORAL at 04:02

## 2024-02-05 RX ADMIN — OSELTAMIVIR PHOSPHATE 30 MG: 30 CAPSULE ORAL at 09:02

## 2024-02-05 RX ADMIN — POTASSIUM CHLORIDE 20 MEQ: 1500 TABLET, EXTENDED RELEASE ORAL at 04:02

## 2024-02-05 RX ADMIN — ASPIRIN 81 MG CHEWABLE TABLET 81 MG: 81 TABLET CHEWABLE at 09:02

## 2024-02-05 NOTE — PT/OT/SLP EVAL
Occupational Therapy   Evaluation and Discharge Note    Name: Jesi Cha  MRN: [id number]  Admitting Diagnosis: SOB  Recent Surgery: * No surgery found *      Recommendations:     Discharge therapy intensity: No Therapy Indicated   Discharge Equipment Recommendations: none  Barriers to discharge:  None    Assessment:     Jesi Cha is a 73 y.o. female with a medical diagnosis of acute HF exacerbation, hypertensive urgency, NSTEMI, Flu B+, acute on chronic kidney disease. On eval, patient completed lower body dressing, functional mobility to and from bathroom, toileting, and oral hygiene standing at the sink independently. Pt with no LOB and did not require use of AD. Pt currently functioning at her baseline level. Skilled OT services are not warranted at this time.    Plan:     OT to sign off as acute OT services are not warranted at this time.  Please re-consult if situation changes during this hospitalization.    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      Patient was found HOB elevated with PureWick, peripheral IV, pulse ox (continuous), telemetry, SCD upon OT entry to room.    General Precautions: Standard, contact, droplet  Orthopedic Precautions: N/A  Braces: N/A    Vital Signs: Blood Pressure: 123/82  HR: 77  Sp02: 95  Respiratory Status: on room air    Bed Mobility:    Patient completed Supine to Sit with  independence    Functional Mobility/Transfers:  Patient completed Sit <> Stand Transfer with independence  with  no assistive device   Patient completed Toilet Transfer Step Transfer technique with independence with  no AD  Functional Mobility: no LOB, independent with mobility to and from bathroom    Activities of Daily Living:  Grooming: independence standing at sink brushing teeth  Lower Body Dressing: independence donning socks  Toileting: independence      Functional Cognition:  Orientation: oriented to Person, Place, Time, and Situation  Safety Awareness: WFL    Upper Extremity Function:  Right Upper Extremity:   Strength: 5/5    Left Upper Extremity:  Strength: 5/5    Balance:   Static sitting balance: WFL  Dynamic sitting balance:WFL  Static standing balance:WFL  Dynamic standing balance:WFL    Therapeutic Positioning  Risk for acquired pressure injuries is decreased due to ability to mobilize independently .    OT interventions performed during the course of today's session in an effort to prevent and/or reduce acquired pressure injuries:   Education was provided on benefits of and recommendations for therapeutic positioning    Skin assessment: all bony prominences were assessed    Findings: no redness or breakdown noted    OT recommendations for therapeutic positioning throughout hospitalization:   Follow [facility] Pressure Injury Prevention Protocol    Patient Education:  Patient provided with verbal education education regarding OT role/goals/POC, fall prevention, safety awareness, and Discharge/DME recommendations.  Understanding was verbalized.     Patient left up in chair with all lines intact and call button in reach    History:     Past Medical History:   Diagnosis Date    HTN (hypertension)     Hypokalemia     Obesity, unspecified     Thyroid cyst          Past Surgical History:   Procedure Laterality Date    APPENDECTOMY       SECTION      CHOLECYSTECTOMY         Time Tracking:     OT Date of  Treatment: 02/05/24  OT Start Time: 1112  OT Stop Time: 1135  OT Total Time (min): 23 min    Billable Minutes:Evaluation low complexity    2/5/2024

## 2024-02-05 NOTE — PROGRESS NOTES
[facility]  Hospital Medicine Progress Note        Chief Complaint: Inpatient Follow-up     HPI:   73-year-old female with past medical history of hypertension presents to the emergency room outside facility 02/02/2024 with complaint shortness breath.  Reports that symptoms started 4 days prior.  Associated with a nonproductive cough.  Denies chest pain.  Had some mild nausea emesis.  Also reports increased peripheral edema.  Denies a known history of heart failure.  He was not followed by cardiologist as an.  At outside facility her blood pressure is noted to sustain over 200.  She was transferred to [facility] for higher level of care.  Upon arrival to our ER her blood pressure is 173/121.  She was afebrile.  He was noted to be tachypneic with a respiratory rate up to 34 but sustaining on room air with good oxygen saturations.  Labs showed no leukocytosis.  She was some mild hypokalemia at 3.3.  Sodium elevated at 151.  Creatinine is 4.08.  Labs from about 6 months ago show baseline around 2.7.  She was denies any known kidney disease.  She also tested positive for influenza B. BNP was 2100.  Troponin 0.299.  Showed chest x-ray showed bilateral pulmonary edema.  She was started on IV Lasix.  She was being admitted to the hospitalist group     Interval Hx:  Patient remains on room air.  Overall feeling okay.  Nephrology evaluated yesterday for concern of worsening renal function.  Attempted to discuss with her possible initiation of dialysis but patient got upset.  No new complaints this morning.  Poor urine output continues      Objective/physical exam:  General: In no acute distress, afebrile  Chest: Clear to auscultation bilaterally  Heart: RRR, +S1, S2, no appreciable murmur  Abdomen: Soft, nontender, BS +  MSK: Warm, no lower extremity edema, no clubbing or cyanosis  Neurologic: Alert and oriented x4, Cranial nerve II-XII intact, Strength 5/5 in all 4 extremities    VITAL  SIGNS: 24 HRS MIN & MAX LAST   Temp  Min: 97.8 °F (36.6 °C)  Max: 98.8 °F (37.1 °C) 98.8 °F (37.1 °C)   BP  Min: 136/98  Max: 173/96 (!) 152/100   Pulse  Min: 84  Max: 100  84   Resp  Min: 18  Max: 18 18   SpO2  Min: 95 %  Max: 99 % 95 %       Recent Labs   Lab 02/03/24  0528 02/04/24  0843 02/05/24  0051   WBC 5.98 6.28 7.09   RBC 4.44 4.49 4.19*   HGB 13.2 13.3 12.3   HCT 40.0 41.6 39.3   MCV 90.1 92.7 93.8   MCH 29.7 29.6 29.4   MCHC 33.0 32.0* 31.3*   RDW 15.9 16.0 16.1    198 193   MPV 10.5* 10.3 10.6*       Recent Labs   Lab 02/02/24 2109 02/03/24 0528 02/04/24  0843   * 149* 147*   K 3.3* 3.7 3.7   CO2 23 22* 23   BUN 61.3* 62.4* 57.9*   CREATININE 4.08* 4.31* 4.39*   CALCIUM 9.3 9.4 9.1   MG  --   --  2.30   ALBUMIN 3.0* 3.2*  --    ALKPHOS 79 70  --    ALT 60* 58*  --    AST 41* 40*  --    BILITOT 0.8 0.8  --           Microbiology Results (last 7 days)       ** No results found for the last 168 hours. **             Radiology:  Echo    Left Ventricle: The left ventricle is normal in size. Moderately   increased wall thickness. Normal wall motion. There is low normal systolic   function with a visually estimated ejection fraction of 50 - 55%. There is   diastolic dysfunction.    Right Ventricle: Normal right ventricular cavity size. Systolic   function is normal.    Left Atrium: Left atrium is moderately dilated.    Aortic Valve: The aortic valve is a trileaflet valve. There is moderate   aortic valve sclerosis. There is moderate aortic regurgitation.    Mitral Valve: There is mild to moderate regurgitation.    Tricuspid Valve: There is mild regurgitation.    IVC/SVC: Intermediate venous pressure at 8 mmHg.    Pericardium: There is a small effusion. No indication of cardiac   tamponade.  US Retroperitoneal Complete  Narrative: EXAMINATION:  US RETROPERITONEAL COMPLETE    CLINICAL HISTORY:  silvia;    TECHNIQUE:  Ultrasound evaluation of the kidneys, region of the ureters, and urinary  bladder.    COMPARISON:  Ultrasound 05/04/2022    FINDINGS:  No hydronephrosis. Increased overall renal echogenicity.    Bladder only mildly distended.    Patent superior IVC.  Imaged segments of the abdominal aorta normal in caliber.    Measurements:    - Right kidney: 9.0 cm in length    - Left kidney: 9.0 cm in length  Impression: No hydronephrosis.  Likely medical renal disease.    Electronically signed by: Carlos Senior  Date:    02/03/2024  Time:    12:09  CT Head Without Contrast  Narrative: EXAMINATION:  CT of the head without contrast    CLINICAL HISTORY:  Confusion, possible stroke    TECHNIQUE:  Routine CT of the head was performed without contrast    Total DLP: 2486.48 mGy.cm    Automatic exposure control was utilized to reduce the patient's dose    COMPARISON:  None    FINDINGS:  There is no acute intracranial hemorrhage, midline shift, mass-effect, or extra-axial collection.  No sulcal effacement.  Gray-white matter differentiation is preserved.  The ventricles are normal in size and configuration for patient's age.  There is moderate patchy and confluent areas of decreased attenuation in the periventricular, deep, and subcortical white matter, while nonspecific, most commonly sequela of chronic microvascular ischemia.  Basal cisterns are patent.  Visualized osseous structures are intact.  There is complete opacification of the right maxillary sinus.  There is mild moderate mucosal thickening of the ethmoid air cells.  There is mild mucosal thickening of the left frontal sinus.  There is bony remodeling of the maxillary sinus suggesting chronic sinus disease.  Mastoid air cells are clear.  Impression: No acute intracranial abnormality.    Chronic changes as above.    Electronically signed by: Gene Willard MD  Date:    02/03/2024  Time:    06:12  X-Ray Chest 1 View  Narrative: EXAMINATION:  Chest one view    CLINICAL HISTORY:  Shortness of breath    COMPARISON:  11/01/2017    FINDINGS:  Cardiac  silhouette is enlarged.  There is central vascular congestion with mild perihilar interstitial markings and alveolar ground-glass densities which may reflect mild pulmonary edema.  No visible pneumothorax or consolidation.  Impression: Cardiac enlargement with central vascular congestion and perihilar interstitial and mild ground-glass opacities, suggesting mild pulmonary edema.    Electronically signed by: Gene Willard MD  Date:    02/03/2024  Time:    06:02      Scheduled Med:   aspirin  81 mg Oral Daily    calcitRIOL  0.25 mcg Oral Daily    furosemide (LASIX) injection  40 mg Intravenous Q12H    hydrALAZINE  50 mg Oral TID    metoprolol succinate  25 mg Oral BID    NIFEdipine  60 mg Oral BID    oseltamivir  30 mg Oral Daily    potassium chloride SA  20 mEq Oral Daily    spironolactone  25 mg Oral Daily        Continuous Infusions:   heparin (porcine) in D5W 14 Units/kg/hr (02/04/24 1918)        PRN Meds:  cloNIDine, heparin (PORCINE), heparin (PORCINE), hydrALAZINE, melatonin, sodium chloride 0.9%       Assessment/Plan:   Acute heart failure exacerbation   Hypertensive urgency   NSTEMI, type 2 demand ischemia   Influenza B   Acute on chronic kidney disease, unknown stage     Appreciate Nephrology recommendations.  Consideration for possible initiation of dialysis.  She does have a history of chronic kidney disease likely stage III or 4.  Her outpatient nephrologist has been consulted for today and she will further discuss her options.  Cardiology continues to follow as well.  Recommending possible outpatient left heart catheterization but will need renal clearance prior    From an influenza standpoint she remained stable.  On room air.  Day 3 of Tamiflu.  Afebrile.  Waiting on BNP to returned this morning.  CBC stable     Critical care diagnosis: acute systolic heart failure requiring IV diuretics  Critical care interventions: hands on evaluation, review of labs/radiographs/records and discussions with  family  Critical care time spent: >32 minutes      Chavez Miles MD   02/05/2024     All diagnosis and differential diagnosis have been reviewed; assessment and plan has been documented; I have personally reviewed the labs and test results that are presently available; I have reviewed the patients medication list; I have reviewed the consulting providers response and recommendations. I have reviewed or attempted to review medical records based upon their availability    All of the patient's questions have been  addressed and answered. Patient's is agreeable to the above stated plan. I will continue to monitor closely and make adjustments to medical management as needed.  _____________________________________________________________________

## 2024-02-05 NOTE — CONSULTS
[facility] New Consult Note    Patient Name: Jesi Cha  Admission Date: 2/3/2024  Hospital Length of Stay: 1 days  Code Status: Full Code   Attending Physician: Chavez Miles MD   Primary Care Physician: Dk Rodriguez MD  Principal Problem:Elevated blood pressure reading    HPI:    Ms Goldstein is a 73-year-old lady my practice.  She has CKD 4 related to nephrosclerosis  Due to progressive renal function deterioration we had a discussion summer for placement of vascular states dialysis however patient was very reluctant to consider dialysis as a matter fact follow-up appointment  Presented to the hospital because pulmonary edema hypertensive  She also had elevated troponin cardiology recommended angiogram  Patient's refused inpatient dialysis.  Once it was found out that she is my patient I have been called for consultation  When I Went to the room patient is no distress.  She diuresed well with IV Lasix  She is on a heparin drip.  There has been questionable AFib better look at the monitor and she does have obvious P waves with PACs.  Patient is still resistant to start dialysis but she agrees that she needs that she go for it      Medical History:   Past Medical History:   Diagnosis Date    HTN (hypertension)     Hypokalemia     Obesity, unspecified     Thyroid cyst    CKD 4  Severe hypertension    Surgical History:   Past Surgical History:   Procedure Laterality Date    APPENDECTOMY       SECTION      CHOLECYSTECTOMY         Family History:   Family History   Problem Relation Age of Onset    Arthritis Mother    .     Social History:   Social History     Tobacco Use    Smoking status: Never     Passive exposure: Never    Smokeless tobacco: Never   Substance Use Topics    Alcohol use: Never       Allergies:  Review of patient's allergies indicates:   Allergen Reactions    Codeine          Review of Systems:  Constitutional: Denies fever, fatigue, chills, or generalized weakness  Skin:  Denies wounds, rashes, no skin lesions or itching  EENT: Denies acute hearing/vision changes, tinnitus, rhinorrhea or dysphagia  Respiratory:  Denies cough, breathing better since on Lasix  Cardiovascular: Denies chest pain, palpitations, or swelling  Gastrointestional: Denies abdominal pain, nausea, vomiting, diarrhea or constipation  Genitourinary:  Better urine output since on Lasix  Musculoskeletal: Denies myalgias, back pain, flank pain, new decreased ROM or acute focal weakness  Neurological: Denies headaches, seizures, paresthesias, dizziness or asterixis  Hematological: Denies unusual bruising or bleeding  Psychiatric: Denies psychiatric concerns, hallucinations, or depression; no confusion    Medications:    Current Facility-Administered Medications:     aspirin chewable tablet 81 mg, 81 mg, Oral, Daily, Chavez Miles MD, 81 mg at 02/04/24 0830    calcitRIOL capsule 0.25 mcg, 0.25 mcg, Oral, Daily, Chavez Miles MD, 0.25 mcg at 02/04/24 0830    cloNIDine tablet 0.1 mg, 0.1 mg, Oral, Q6H PRN, Clarke Ely MD, 0.1 mg at 02/05/24 0436    furosemide injection 40 mg, 40 mg, Intravenous, Q12H, Chavez Miles MD, 40 mg at 02/04/24 2226    heparin 25,000 units in dextrose 5% (100 units/ml) IV bolus from bag - ADDITIONAL PRN BOLUS - 30 units/kg (max bolus 4000 units), 30 Units/kg (Adjusted), Intravenous, PRN, Amy Gonzalez FNP, 1,980 Units at 02/04/24 1916    heparin 25,000 units in dextrose 5% (100 units/ml) IV bolus from bag - ADDITIONAL PRN BOLUS - 60 units/kg (max bolus 4000 units), 60 Units/kg (Adjusted), Intravenous, PRN, Amy Gonzalez FNP    heparin 25,000 units in dextrose 5% 250 mL (100 units/mL) infusion LOW INTENSITY nomogram - LAF, 12 Units/kg/hr (Adjusted), Intravenous, Continuous, Amy Gonzalez FNP, Last Rate: 9.2 mL/hr at 02/04/24 1918, 14 Units/kg/hr at 02/04/24 1918    hydrALAZINE injection 10 mg, 10 mg, Intravenous, Q2H PRN, Clarke Ely MD, 10 mg at  "02/03/24 0744    hydrALAZINE tablet 50 mg, 50 mg, Oral, TID, Chavez Miles MD, 50 mg at 02/04/24 2109    melatonin tablet 6 mg, 6 mg, Oral, Nightly PRN, Clarke Ely MD    metoprolol succinate (TOPROL-XL) 24 hr tablet 25 mg, 25 mg, Oral, BID, Chavez Miles MD, 25 mg at 02/04/24 2109    NIFEdipine 24 hr tablet 60 mg, 60 mg, Oral, BID, Chavez Miles MD, 60 mg at 02/04/24 2109    oseltamivir capsule 30 mg, 30 mg, Oral, Daily, Vanda Alfaro AGACNP-BC, 30 mg at 02/04/24 0835    potassium chloride SA CR tablet 20 mEq, 20 mEq, Oral, Daily, Chavez Miles MD, 20 mEq at 02/04/24 1605    sodium chloride 0.9% flush 10 mL, 10 mL, Intravenous, PRN, Clarke Ely MD    spironolactone tablet 25 mg, 25 mg, Oral, Daily, Chavez Miles MD, 25 mg at 02/04/24 0830     Scheduled Meds:   aspirin  81 mg Oral Daily    calcitRIOL  0.25 mcg Oral Daily    furosemide (LASIX) injection  40 mg Intravenous Q12H    hydrALAZINE  50 mg Oral TID    metoprolol succinate  25 mg Oral BID    NIFEdipine  60 mg Oral BID    oseltamivir  30 mg Oral Daily    potassium chloride SA  20 mEq Oral Daily    spironolactone  25 mg Oral Daily     Continuous Infusions:   heparin (porcine) in D5W 14 Units/kg/hr (02/04/24 1918)         Objective:  BP (!) 143/98   Pulse 87   Temp 98.2 °F (36.8 °C) (Oral)   Resp 18   Ht 5' 2.99" (1.6 m)   Wt 86.2 kg (190 lb)   SpO2 97%   BMI 33.67 kg/m²  Body mass index is 33.67 kg/m².    I/O last 3 completed shifts:  In: 260 [P.O.:260]  Out: 1750 [Urine:1750]  No intake/output data recorded.        Physical Exam:  General: no acute distress, awake, alert  Eyes: PERRLA, EOMI, conjunctiva clear, eyelids without swelling  HENT: atraumatic, oropharynx and nasal mucosa patent, no difficulty hearing  Neck: full ROM, no JVD, no thyromegaly or lymphadenopathy  Respiratory: equal, unlabored, fine rhonchi at the bases  Cardiovascular:  Occasional premature beats.  Systolic ejection murmur 2 6 at the left sternal " "border; BL radial and pedal pulses felt  Edema: none  Gastrointestinal: soft, non-tender, non-distended; positive bowel sounds; no masses to palpation  Musculoskeletal: full ROM without limitation or discomfort  Integumentary: warm, dry; no rashes, wounds, or skin lesions  Neurological: oriented, appropriate, no acute deficits        Labs:  Chemistries:   Recent Labs   Lab 02/02/24  2109 02/03/24  0528 02/04/24  0843   * 149* 147*   K 3.3* 3.7 3.7   CO2 23 22* 23   BUN 61.3* 62.4* 57.9*   CREATININE 4.08* 4.31* 4.39*   CALCIUM 9.3 9.4 9.1   BILITOT 0.8 0.8  --    ALKPHOS 79 70  --    ALT 60* 58*  --    AST 41* 40*  --    GLUCOSE 110 125* 92   MG  --   --  2.30        CBC/Anemia Labs: Coags:    Recent Labs   Lab 02/03/24  0528 02/04/24  0843 02/05/24  0051   WBC 5.98 6.28 7.09   HGB 13.2 13.3 12.3   HCT 40.0 41.6 39.3    198 193   MCV 90.1 92.7 93.8   RDW 15.9 16.0 16.1    No results for input(s): "PT", "INR", "APTT" in the last 168 hours.       Impression:    CKD 4-5 related to nephrosclerosis  Progressive renal function deterioration  Volume overload better  Possible demand ischemia  History of uncontrolled hypertension      Plan:  Recheck renin aldosterone level  Although there is no acute indication for dialysis but patient understands the possibility is coming very soon, but she prefers not to for now.  Continue diuresis for now  Continue cardiac workup  If she is discharged home with no dialysis she promised she will followup with me very regularly    Annabel Clemons      Thank you for this consult.   "

## 2024-02-05 NOTE — PLAN OF CARE
02/05/24 0941   Discharge Assessment   Assessment Type Discharge Planning Assessment   Confirmed/corrected address, phone number and insurance Yes   Source of Information patient;family  (John Goldstein (Son) [phone] (Mobile); Daughter: Darrel Ureña: [phone])   Communicated OREN with patient/caregiver Date not available/Unable to determine   Reason For Admission 73-year-old with CKD 4 related to nephrosclerosis  Due to progressive renal function deterioration we had a discussion summer for placement of vascular states dialysis however patient was very reluctant to consider dialysis as a matter fact follow-up appointment  Presented to the hospital because pulmonary edema hypertensive  She also had elevated troponin cardiology recommended angiogram  Patient's refused inpatient dialysis.  Once it was found out that she is my patient I have been called for consultation  When I Went to the room patient is no distress.  She diuresed well with IV Lasix  She is on a heparin drip.  There has been questionable AFib better look at the monitor and she does have obvious P waves with PACs.  Patient is still resistant to start dialysis but she agrees that she needs that she go for it.   People in Home spouse  (The patient lives in her private residence with her : Ned Cha: [phone])   Facility Arrived From: Private residence.   Do you expect to return to your current living situation? Yes   Do you have help at home or someone to help you manage your care at home? Yes   Who are your caregiver(s) and their phone number(s)? The patient lives in her private residence with her : Ned Cha: [phone]   Prior to hospitilization cognitive status: Alert/Oriented   Current cognitive status: Alert/Oriented   Walking or Climbing Stairs Difficulty yes   Walking or Climbing Stairs ambulation difficulty, requires equipment   Mobility Management The patient uses a rolling walker/rollator  for mobility concerns.   Dressing/Bathing Difficulty no   Home Accessibility wheelchair accessible  (There is (1) step to climb prior to entering her home.)   Home Layout Able to live on 1st floor   Equipment Currently Used at Home shower chair;walker, rolling;rollator;bedside commode   Readmission within 30 days? No   Do you currently have service(s) that help you manage your care at home? No   Do you take prescription medications? Yes   Do you have prescription coverage? Yes   Coverage Payor: [employer] MANAGED MEDICARE - [employer] MEDICARE HMO -   Do you have any problems affording any of your prescribed medications? No   Is the patient taking medications as prescribed? yes   Who is going to help you get home at discharge? The patient lives in her private residence with her : Ned Cha: [phone]   How do you get to doctors appointments? car, drives self;family or friend will provide   Are you on dialysis? No   Do you take coumadin? No   Discharge Plan A Home with family   Discharge Plan B Home with family   DME Needed Upon Discharge  none   Discharge Plan discussed with: Patient;Adult children  (The patient lives in her private residence with her : Ned hCa: [phone]. Daughter: Darrel Cha: [phone].)   Transition of Care Barriers None   Social Connections   In a typical week, how many times do you talk on the phone with family, friends, or neighbors? Twice a week   How often do you get together with friends or relatives? Twice   How often do you attend Pentecostal or Mormon services? More than 4   Do you belong to any clubs or organizations such as Pentecostal groups, unions, fraternal or athletic groups, or school groups?   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 More than 4   Are you , , , , never , or living with a  partner?   ( x51 years.)   Alcohol Use   Q1: How often do you have a drink containing alcohol? Never   Q2: How many drinks containing alcohol do you have on a typical day when you are drinking? None   Q3: How often do you have six or more drinks on one occasion? Never   OTHER   Name(s) of People in Home The patient lives in her private residence with her : Ned Cha: [phone]

## 2024-02-05 NOTE — PROGRESS NOTES
[facility] -    Cardiology  Progress Note    Patient Name: Jesi Cha  MRN: [id number]  Admission Date: 2/3/2024  Hospital Length of Stay: 1 days  Code Status: Full Code   Attending Provider: Chavez Miles MD   Consulting Provider: MYRTLE Carranza  Primary Care Physician: Dk Rodriguez MD  Principal Problem:Elevated blood pressure reading    Patient information was obtained from patient, past medical records, and ER records.     Subjective:     Pt seen and examined by me, Luis Angel Verdugo MD. I have reviewed the NP's note, assessment and plan. I have personally interviewed and examined the patient at bedside and agree with the findings. Medical decision making listed above were done under my guidance.     Physical exam:  NAD  Cardiovascular system: regular rhythm, no murmur.  Lungs: CTAB.  Abd: S/ST/ND  Extremities: No leg edema.      Assessment and Plan: Minimally elevated troponins in setting of CKD with no active cardiac complaints  -Discussed with Dr. Clemons and will defer BP management to Nephrology  -Will arrange for outpt LexiPET with f/u  -we will consider statin as outpatient        Reason for consult: NSTEMI, afib    HPI:   Ms. Cha is a 72 y/o female, known to [facility] through telecardiology consult only, who presented to [facility] ER with c/o SOB and peripheral edema. /150 on arrival. She reports she stopped taking her medications.  Lab significant for BUN/creatinine 61.3/4.08, AST/ALT 40/58, BNP 2138, troponin 0.299, Influenza B+. Negative COVID. CXR consistent with pulmonary edema. CT head negative for acute intracranial abnormality. EKG revealing SR with inferior and anterolateral T wave inversions. She was treated with   A cardizem drip and transferred to [facility] for admit to hospital medicine. [facility] has been consulted for new Afib, NSTEMI. No evidence of Afib scanned to chart.     Hospital Course:  02.04.24: Denies CP/SOB. Troponin down-trending. Renal  indices remain elevated. Good UOP. (The 1 liter she put out in ED was not charted). Echo revealing EF 50-55% with diastolic dysfunction and moderate AR/MR  24: Patient awake in bed. NAD. Denies CP/SOB. Remains hypertensive. Dr. Clemons would like pressure to run on higher side due to CKD and will make antihypertensive adjustments if needed.       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 Moderately increased wall thickness. Normal wall motion. There is low normal systolic function with a visually estimated ejection fraction of 50 - 55%. There is diastolic dysfunction.    Right Ventricle: Normal right ventricular cavity size. Systolic function is normal.    Left Atrium: Left atrium is moderately dilated.    Aortic Valve: The aortic valve is a trileaflet valve. There is moderate aortic valve sclerosis. There is moderate aortic regurgitation.    Mitral Valve: There is mild to moderate regurgitation.    Tricuspid Valve: There is mild regurgitation.    IVC/SVC: Intermediate venous pressure at 8 mmHg.    Pericardium: There is a small effusion. No indication of cardiac tamponade.    TTE 22:  Normal LV systolic function, LVEF 60-65%.  Moderate concentric LVH.  AV sclerosis without stenosis.  Mild Aortic regurgitation.  Moderate posteriorly directed jet of mitral regurgitation. If clinically indicated, this can be further assessed and quantified by ANGELIQUE  and/or Cardiac MRI.      No current facility-administered medications on file prior to encounter.     Current Outpatient Medications on File Prior to Encounter   Medication Sig    aspirin 81 mg Cap Take 81 mg by mouth once daily at 6am.    calcitRIOL (ROCALTROL) 0.25 MCG Cap Take 1 capsule (0.25 mcg total) by mouth once daily.    hydrALAZINE  (APRESOLINE) 50 MG tablet Take 1 tablet (50 mg total) by mouth 3 (three) times daily.    losartan (COZAAR) 50 MG tablet Take 1 tablet (50 mg total) by mouth once daily.    metoprolol succinate (TOPROL-XL) 25 MG 24 hr tablet Take 1 tablet (25 mg total) by mouth 2 (two) times daily.    NIFEdipine (PROCARDIA-XL) 60 MG (OSM) 24 hr tablet Take 1 tablet (60 mg total) by mouth 2 (two) times a day.    nitroGLYCERIN (NITROSTAT) 0.4 MG SL tablet Place 0.4 mg under the tongue.    potassium chloride SA (K-DUR,KLOR-CON) 20 MEQ tablet Take 20 mEq by mouth Daily.    spironolactone (ALDACTONE) 25 MG tablet Take 1 tablet (25 mg total) by mouth once daily.         Review of Systems   Respiratory:  Negative for cough and shortness of breath.    Cardiovascular:  Negative for chest pain.   Gastrointestinal: Negative.    Genitourinary: Negative.    Neurological: Negative.    Psychiatric/Behavioral: Negative.         Objective:     Vital Signs (Most Recent):  Temp: 98.2 °F (36.8 °C) (02/05/24 0819)  Pulse: 87 (02/05/24 0819)  Resp: 18 (02/05/24 0819)  BP: (!) 143/98 (02/05/24 0819)  SpO2: 97 % (02/05/24 0819) Vital Signs (24h Range):  Temp:  [97.8 °F (36.6 °C)-98.8 °F (37.1 °C)] 98.2 °F (36.8 °C)  Pulse:  [] 87  Resp:  [18] 18  SpO2:  [95 %-99 %] 97 %  BP: (136-152)/() 143/98     Weight: 86.2 kg (190 lb)  Body mass index is 33.67 kg/m².    SpO2: 97 %         Intake/Output Summary (Last 24 hours) at 2/5/2024 1040  Last data filed at 2/5/2024 1011  Gross per 24 hour   Intake 260 ml   Output 1450 ml   Net -1190 ml         Lines/Drains/Airways       Peripheral Intravenous Line  Duration                  Peripheral IV - Single Lumen 02/03/24 0910 18 G Anterior;Distal;Right Forearm 2 days                    Significant Labs:  Recent Results (from the past 72 hour(s))   Troponin ISTAT    Collection Time: 02/02/24  8:35 PM   Result Value Ref Range    POC Cardiac Troponin I 0.12 (H) 0.00 - 0.08 ng/mL    Sample unknown    BNP     Collection Time: 02/02/24  8:36 PM   Result Value Ref Range    Natriuretic Peptide 2,138.0 (H) <=100.0 pg/mL   CBC with Differential    Collection Time: 02/02/24  8:36 PM   Result Value Ref Range    WBC 7.87 4.50 - 11.50 x10(3)/mcL    RBC 4.75 4.20 - 5.40 x10(6)/mcL    Hgb 13.7 12.0 - 16.0 g/dL    Hct 43.5 37.0 - 47.0 %    MCV 91.6 80.0 - 94.0 fL    MCH 28.8 27.0 - 31.0 pg    MCHC 31.5 (L) 33.0 - 36.0 g/dL    RDW 15.6 11.5 - 17.0 %    Platelet 209 130 - 400 x10(3)/mcL    MPV 10.5 (H) 7.4 - 10.4 fL    Neut % 51.8 %    Lymph % 34.4 %    Mono % 11.2 %    Eos % 2.2 %    Basophil % 0.3 %    Lymph # 2.71 0.6 - 4.6 x10(3)/mcL    Neut # 4.08 2.1 - 9.2 x10(3)/mcL    Mono # 0.88 0.1 - 1.3 x10(3)/mcL    Eos # 0.17 0 - 0.9 x10(3)/mcL    Baso # 0.02 <=0.2 x10(3)/mcL    IG# 0.01 0 - 0.04 x10(3)/mcL    IG% 0.1 %   COVID/RSV/FLU A&B PCR    Collection Time: 02/02/24  8:54 PM   Result Value Ref Range    Influenza A PCR Not Detected Not Detected    Influenza B PCR Detected (A) Not Detected    Respiratory Syncytial Virus PCR Not Detected Not Detected    SARS-CoV-2 PCR Not Detected Not Detected, Negative   Comprehensive metabolic panel    Collection Time: 02/02/24  9:09 PM   Result Value Ref Range    Sodium Level 151 (H) 136 - 145 mmol/L    Potassium Level 3.3 (L) 3.5 - 5.1 mmol/L    Chloride 113 (H) 98 - 107 mmol/L    Carbon Dioxide 23 23 - 31 mmol/L    Glucose Level 110 82 - 115 mg/dL    Blood Urea Nitrogen 61.3 (H) 9.8 - 20.1 mg/dL    Creatinine 4.08 (H) 0.55 - 1.02 mg/dL    Calcium Level Total 9.3 8.4 - 10.2 mg/dL    Protein Total 6.4 5.8 - 7.6 gm/dL    Albumin Level 3.0 (L) 3.4 - 4.8 g/dL    Globulin 3.4 2.4 - 3.5 gm/dL    Albumin/Globulin Ratio 0.9 (L) 1.1 - 2.0 ratio    Bilirubin Total 0.8 <=1.5 mg/dL    Alkaline Phosphatase 79 40 - 150 unit/L    Alanine Aminotransferase 60 (H) 0 - 55 unit/L    Aspartate Aminotransferase 41 (H) 5 - 34 unit/L    eGFR 11 mls/min/1.73/m2   Troponin ISTAT    Collection Time: 02/02/24  9:12 PM    Result Value Ref Range    POC Cardiac Troponin I 0.14 (H) 0.00 - 0.08 ng/mL    Sample unknown    Urinalysis, Reflex to Urine Culture    Collection Time: 02/02/24  9:29 PM    Specimen: Urine   Result Value Ref Range    Color, UA Yellow Yellow, Light-Yellow, Dark Yellow, Zoila, Straw    Appearance, UA Clear Clear    Specific Gravity, UA 1.025 1.005 - 1.030    pH, UA 6.0 5.0 - 8.5    Protein, UA >=300 (A) Negative    Glucose, UA Negative Negative, Normal    Ketones, UA Negative Negative    Blood, UA Small (A) Negative    Bilirubin, UA Negative Negative    Urobilinogen, UA 0.2 0.2, 1.0, Normal    Nitrites, UA Negative Negative    Leukocyte Esterase, UA Negative Negative   Urinalysis, Microscopic    Collection Time: 02/02/24  9:29 PM   Result Value Ref Range    Bacteria, UA None Seen None Seen, Rare, Occasional /HPF    Hyaline Casts, UA Few (A) None Seen /LPF    Granular Casts, UA Occ (A) None Seen /LPF    WBC Casts, UA Occ (A) None Seen /LPF    RBC, UA 0-2 None Seen, 0-2, 3-5, 0-5 /HPF    WBC, UA 0-2 None Seen, 0-2, 3-5, 0-5 /HPF    Squamous Epithelial Cells, UA Occ None Seen, Rare, Occasional, Occ /HPF   POCT glucose    Collection Time: 02/02/24 11:40 PM   Result Value Ref Range    POCT Glucose 132 (H) 70 - 110 mg/dL   Troponin I    Collection Time: 02/03/24  5:28 AM   Result Value Ref Range    Troponin-I 0.299 (H) 0.000 - 0.045 ng/mL   Comprehensive metabolic panel    Collection Time: 02/03/24  5:28 AM   Result Value Ref Range    Sodium Level 149 (H) 136 - 145 mmol/L    Potassium Level 3.7 3.5 - 5.1 mmol/L    Chloride 114 (H) 98 - 107 mmol/L    Carbon Dioxide 22 (L) 23 - 31 mmol/L    Glucose Level 125 (H) 82 - 115 mg/dL    Blood Urea Nitrogen 62.4 (H) 9.8 - 20.1 mg/dL    Creatinine 4.31 (H) 0.55 - 1.02 mg/dL    Calcium Level Total 9.4 8.4 - 10.2 mg/dL    Protein Total 5.9 5.8 - 7.6 gm/dL    Albumin Level 3.2 (L) 3.4 - 4.8 g/dL    Globulin 2.7 2.4 - 3.5 gm/dL    Albumin/Globulin Ratio 1.2 1.1 - 2.0 ratio    Bilirubin  Total 0.8 <=1.5 mg/dL    Alkaline Phosphatase 70 40 - 150 unit/L    Alanine Aminotransferase 58 (H) 0 - 55 unit/L    Aspartate Aminotransferase 40 (H) 5 - 34 unit/L    eGFR 10 mls/min/1.73/m2   CBC with Differential    Collection Time: 02/03/24  5:28 AM   Result Value Ref Range    WBC 5.98 4.50 - 11.50 x10(3)/mcL    RBC 4.44 4.20 - 5.40 x10(6)/mcL    Hgb 13.2 12.0 - 16.0 g/dL    Hct 40.0 37.0 - 47.0 %    MCV 90.1 80.0 - 94.0 fL    MCH 29.7 27.0 - 31.0 pg    MCHC 33.0 33.0 - 36.0 g/dL    RDW 15.9 11.5 - 17.0 %    Platelet 190 130 - 400 x10(3)/mcL    MPV 10.5 (H) 7.4 - 10.4 fL    Neut % 67.2 %    Lymph % 21.6 %    Mono % 9.0 %    Eos % 1.2 %    Basophil % 0.5 %    Lymph # 1.29 0.6 - 4.6 x10(3)/mcL    Neut # 4.02 2.1 - 9.2 x10(3)/mcL    Mono # 0.54 0.1 - 1.3 x10(3)/mcL    Eos # 0.07 0 - 0.9 x10(3)/mcL    Baso # 0.03 <=0.2 x10(3)/mcL    IG# 0.03 0 - 0.04 x10(3)/mcL    IG% 0.5 %    NRBC% 0.0 %   Hemoglobin A1C    Collection Time: 02/03/24  5:28 AM   Result Value Ref Range    Hemoglobin A1c 5.3 <=7.0 %    Estimated Average Glucose 105.4 mg/dL   Lipid Panel    Collection Time: 02/03/24  5:28 AM   Result Value Ref Range    Cholesterol Total 171 <=200 mg/dL    HDL Cholesterol 71 (H) 35 - 60 mg/dL    Triglyceride 47 37 - 140 mg/dL    Cholesterol/HDL Ratio 2 0 - 5    Very Low Density Lipoprotein 9     LDL Cholesterol 91.00 50.00 - 140.00 mg/dL   Troponin I    Collection Time: 02/03/24 10:54 AM   Result Value Ref Range    Troponin-I 0.206 (H) 0.000 - 0.045 ng/mL   Echo    Collection Time: 02/03/24  1:02 PM   Result Value Ref Range    BSA 1.96 m2    Lopez's Biplane MOD Ejection Fraction 47 %    LVOT stroke volume 50.87 cm3    LVIDd 4.49 3.5 - 6.0 cm    LV Systolic Volume 54.10 mL    LV Systolic Volume Index 28.6 mL/m2    LVIDs 3.59 2.1 - 4.0 cm    LV Diastolic Volume 92.00 mL    LV Diastolic Volume Index 48.68 mL/m2    IVS 1.49 (A) 0.6 - 1.1 cm    LVOT diameter 2.00 cm    LVOT area 3.1 cm2    FS 20 (A) 28 - 44 %    Left  Ventricle Relative Wall Thickness 0.68 cm    Posterior Wall 1.52 (A) 0.6 - 1.1 cm    LV mass 276.29 g    LV Mass Index 146 g/m2    MV Peak E Richard 0.91 m/s    TDI LATERAL 0.11 m/s    TDI SEPTAL 0.07 m/s    E/E' ratio 10.11 m/s    MV Peak A Richard 1.02 m/s    TR Max Richard 2.30 m/s    E/A ratio 0.89     LV SEPTAL E/E' RATIO 13.00 m/s    LV LATERAL E/E' RATIO 8.27 m/s    LVOT peak richard 0.94 m/s    Left Ventricular Outflow Tract Mean Velocity 0.70 cm/s    Left Ventricular Outflow Tract Mean Gradient 2.00 mmHg    TAPSE 1.88 cm    LA size 5.10 cm    LA volume (mod) 121.00 cm3    LA Volume Index (Mod) 64.0 mL/m2    AV regurgitation pressure 1/2 time 567 ms    AR Max Richard 4.89 m/s    Vn Nyquist MS 0.39 m/s    AV mean gradient 8 mmHg    AV peak gradient 13 mmHg    Ao peak richard 1.83 m/s    Ao VTI 28.90 cm    LVOT peak VTI 16.20 cm    AV valve area 1.76 cm²    AV Velocity Ratio 0.51     AV index (prosthetic) 0.56     LAXMI by Velocity Ratio 1.61 cm²    Radius 0.50 cm    Vn Nyquist 0.39 m/s    Mr max richard 6.11 m/s    MR PISA EROA 0.10 cm2    MV mean gradient 2 mmHg    MV peak gradient 4 mmHg    MV stenosis pressure 1/2 time 87.00 ms    MV valve area p 1/2 method 2.53 cm2    MV valve area by continuity eq 1.99 cm2    MV VTI 25.6 cm    Triscuspid Valve Regurgitation Peak Gradient 21 mmHg    PV PEAK VELOCITY 1.01 m/s    PV peak gradient 4 mmHg    Mean e' 0.09 m/s    ZLVIDS 0.76     ZLVIDD -1.64     TV resting pulmonary artery pressure 29 mmHg    RV TB RVSP 10 mmHg    Est. RA pres 8 mmHg   Protein/Creatinine Ratio, Urine    Collection Time: 02/04/24  8:31 AM   Result Value Ref Range    Urine Protein Level 110.1 mg/dL    Urine Creatinine 36.5 (L) 45.0 - 106.0 mg/dL    Urine Protein/Creatinine Ratio 3.0    Sodium, Random Urine    Collection Time: 02/04/24  8:31 AM   Result Value Ref Range    Urine Sodium 114.0 mmol/L   Basic Metabolic Panel    Collection Time: 02/04/24  8:43 AM   Result Value Ref Range    Sodium Level 147 (H) 136 - 145 mmol/L     Potassium Level 3.7 3.5 - 5.1 mmol/L    Chloride 112 (H) 98 - 107 mmol/L    Carbon Dioxide 23 23 - 31 mmol/L    Glucose Level 92 82 - 115 mg/dL    Blood Urea Nitrogen 57.9 (H) 9.8 - 20.1 mg/dL    Creatinine 4.39 (H) 0.55 - 1.02 mg/dL    BUN/Creatinine Ratio 13     Calcium Level Total 9.1 8.4 - 10.2 mg/dL    Anion Gap 12.0 mEq/L    eGFR 10 mls/min/1.73/m2   Magnesium    Collection Time: 02/04/24  8:43 AM   Result Value Ref Range    Magnesium Level 2.30 1.60 - 2.60 mg/dL   CBC with Differential    Collection Time: 02/04/24  8:43 AM   Result Value Ref Range    WBC 6.28 4.50 - 11.50 x10(3)/mcL    RBC 4.49 4.20 - 5.40 x10(6)/mcL    Hgb 13.3 12.0 - 16.0 g/dL    Hct 41.6 37.0 - 47.0 %    MCV 92.7 80.0 - 94.0 fL    MCH 29.6 27.0 - 31.0 pg    MCHC 32.0 (L) 33.0 - 36.0 g/dL    RDW 16.0 11.5 - 17.0 %    Platelet 198 130 - 400 x10(3)/mcL    MPV 10.3 7.4 - 10.4 fL    Neut % 64.1 %    Lymph % 22.8 %    Mono % 10.4 %    Eos % 1.9 %    Basophil % 0.5 %    Lymph # 1.43 0.6 - 4.6 x10(3)/mcL    Neut # 4.03 2.1 - 9.2 x10(3)/mcL    Mono # 0.65 0.1 - 1.3 x10(3)/mcL    Eos # 0.12 0 - 0.9 x10(3)/mcL    Baso # 0.03 <=0.2 x10(3)/mcL    IG# 0.02 0 - 0.04 x10(3)/mcL    IG% 0.3 %    NRBC% 0.0 %   PTT Heparin Monitoring    Collection Time: 02/04/24 11:31 AM   Result Value Ref Range    PTT Heparin Monitor 33.9 (H) 23.2 - 33.7 seconds   PTT Heparin Monitoring    Collection Time: 02/04/24  5:22 PM   Result Value Ref Range    PTT Heparin Monitor 60.1 (H) 23.2 - 33.7 seconds   PTT Heparin Monitoring    Collection Time: 02/05/24 12:51 AM   Result Value Ref Range    PTT Heparin Monitor 66.5 (H) 23.2 - 33.7 seconds   APTT    Collection Time: 02/05/24 12:51 AM   Result Value Ref Range    PTT 65.5 (H) 23.2 - 33.7 seconds   CBC with Differential    Collection Time: 02/05/24 12:51 AM   Result Value Ref Range    WBC 7.09 4.50 - 11.50 x10(3)/mcL    RBC 4.19 (L) 4.20 - 5.40 x10(6)/mcL    Hgb 12.3 12.0 - 16.0 g/dL    Hct 39.3 37.0 - 47.0 %    MCV 93.8 80.0 -  94.0 fL    MCH 29.4 27.0 - 31.0 pg    MCHC 31.3 (L) 33.0 - 36.0 g/dL    RDW 16.1 11.5 - 17.0 %    Platelet 193 130 - 400 x10(3)/mcL    MPV 10.6 (H) 7.4 - 10.4 fL    Neut % 60.4 %    Lymph % 25.2 %    Mono % 11.3 %    Eos % 2.4 %    Basophil % 0.3 %    Lymph # 1.79 0.6 - 4.6 x10(3)/mcL    Neut # 4.28 2.1 - 9.2 x10(3)/mcL    Mono # 0.80 0.1 - 1.3 x10(3)/mcL    Eos # 0.17 0 - 0.9 x10(3)/mcL    Baso # 0.02 <=0.2 x10(3)/mcL    IG# 0.03 0 - 0.04 x10(3)/mcL    IG% 0.4 %    NRBC% 0.0 %   PTT Heparin Monitoring    Collection Time: 02/05/24  7:46 AM   Result Value Ref Range    PTT Heparin Monitor 81.1 (H) 23.2 - 33.7 seconds       Significant Imaging:  Imaging Results              US Retroperitoneal Complete (Final result)  Result time 02/03/24 12:09:11      Final result by Carlos Senior MD (02/03/24 12:09:11)                   Impression:      No hydronephrosis.  Likely medical renal disease.      Electronically signed by: Carlos Senior  Date:    02/03/2024  Time:    12:09               Narrative:    EXAMINATION:  US RETROPERITONEAL COMPLETE    CLINICAL HISTORY:  silvia;    TECHNIQUE:  Ultrasound evaluation of the kidneys, region of the ureters, and urinary bladder.    COMPARISON:  Ultrasound 05/04/2022    FINDINGS:  No hydronephrosis. Increased overall renal echogenicity.    Bladder only mildly distended.    Patent superior IVC.  Imaged segments of the abdominal aorta normal in caliber.    Measurements:    - Right kidney: 9.0 cm in length    - Left kidney: 9.0 cm in length                                    Telemetry: SR      Physical Exam  HENT:      Mouth/Throat:      Mouth: Mucous membranes are moist.   Cardiovascular:      Rate and Rhythm: Normal rate and regular rhythm.      Pulses: Normal pulses.      Heart sounds: Murmur heard.   Pulmonary:      Effort: Pulmonary effort is normal.      Breath sounds: No rales.   Abdominal:      Palpations: Abdomen is soft.   Musculoskeletal:         General: Normal range of motion.    Skin:     General: Skin is warm.      Capillary Refill: Capillary refill takes less than 2 seconds.   Neurological:      Mental Status: She is alert and oriented to person, place, and time.         Home Medications:   No current facility-administered medications on file prior to encounter.     Current Outpatient Medications on File Prior to Encounter   Medication Sig Dispense Refill    aspirin 81 mg Cap Take 81 mg by mouth once daily at 6am.      calcitRIOL (ROCALTROL) 0.25 MCG Cap Take 1 capsule (0.25 mcg total) by mouth once daily. 30 capsule 11    hydrALAZINE (APRESOLINE) 50 MG tablet Take 1 tablet (50 mg total) by mouth 3 (three) times daily. 270 tablet 3    losartan (COZAAR) 50 MG tablet Take 1 tablet (50 mg total) by mouth once daily. 90 tablet 3    metoprolol succinate (TOPROL-XL) 25 MG 24 hr tablet Take 1 tablet (25 mg total) by mouth 2 (two) times daily. 180 tablet 3    NIFEdipine (PROCARDIA-XL) 60 MG (OSM) 24 hr tablet Take 1 tablet (60 mg total) by mouth 2 (two) times a day. 180 tablet 3    nitroGLYCERIN (NITROSTAT) 0.4 MG SL tablet Place 0.4 mg under the tongue.      potassium chloride SA (K-DUR,KLOR-CON) 20 MEQ tablet Take 20 mEq by mouth Daily.      spironolactone (ALDACTONE) 25 MG tablet Take 1 tablet (25 mg total) by mouth once daily. 90 tablet 3       Current Inpatient Medications:    Current Facility-Administered Medications:     aspirin chewable tablet 81 mg, 81 mg, Oral, Daily, Chavez Miles MD, 81 mg at 02/05/24 0958    calcitRIOL capsule 0.25 mcg, 0.25 mcg, Oral, Daily, Chavez Miles MD, 0.25 mcg at 02/05/24 0958    cloNIDine tablet 0.1 mg, 0.1 mg, Oral, Q6H PRN, Clarke Ely MD, 0.1 mg at 02/05/24 0436    cloNIDine tablet 0.2 mg, 0.2 mg, Oral, BID, Annabel Clemons MD, 0.2 mg at 02/05/24 0958    furosemide injection 40 mg, 40 mg, Intravenous, Q12H, Chavez Miles MD, 40 mg at 02/05/24 0958    heparin 25,000 units in dextrose 5% (100 units/ml) IV bolus from bag - ADDITIONAL PRN  BOLUS - 30 units/kg (max bolus 4000 units), 30 Units/kg (Adjusted), Intravenous, PRN, Amy Gonzalez, JOSEP, 1,980 Units at 02/04/24 1916    heparin 25,000 units in dextrose 5% (100 units/ml) IV bolus from bag - ADDITIONAL PRN BOLUS - 60 units/kg (max bolus 4000 units), 60 Units/kg (Adjusted), Intravenous, PRN, Amy Gonzalez, FNP    heparin 25,000 units in dextrose 5% 250 mL (100 units/mL) infusion LOW INTENSITY nomogram - [facility], 12 Units/kg/hr (Adjusted), Intravenous, Continuous, Amy Gonzalez, JOSEP, Last Rate: 9.2 mL/hr at 02/05/24 1003, 14 Units/kg/hr at 02/05/24 1003    hydrALAZINE injection 10 mg, 10 mg, Intravenous, Q2H PRN, Clarke Ely MD, 10 mg at 02/03/24 0744    hydrALAZINE tablet 50 mg, 50 mg, Oral, TID, Chavez Miles MD, 50 mg at 02/05/24 0958    melatonin tablet 6 mg, 6 mg, Oral, Nightly PRN, Clarke Ely MD    metoprolol succinate (TOPROL-XL) 24 hr tablet 25 mg, 25 mg, Oral, BID, Chavez Miles MD, 25 mg at 02/05/24 0958    NIFEdipine 24 hr tablet 60 mg, 60 mg, Oral, BID, Chavez Miles MD, 60 mg at 02/05/24 0958    oseltamivir capsule 30 mg, 30 mg, Oral, Daily, Vanda Alfaro AGACNP-BC, 30 mg at 02/05/24 0958    potassium chloride SA CR tablet 20 mEq, 20 mEq, Oral, Daily, Chavez Miles MD, 20 mEq at 02/04/24 1605    sodium chloride 0.9% flush 10 mL, 10 mL, Intravenous, PRN, Clarke Ely MD    spironolactone tablet 25 mg, 25 mg, Oral, Daily, Chavez Miles MD, 25 mg at 02/05/24 0958         VTE Risk Mitigation (From admission, onward)           Ordered     heparin 25,000 units in dextrose 5% (100 units/ml) IV bolus from bag - ADDITIONAL PRN BOLUS - 60 units/kg (max bolus 4000 units)  As needed (PRN)        Question:  Heparin Infusion Adjustment (DO NOT MODIFY ANSWER)  Answer:  [email or web address]\Images\Pharmacy\HeparinInfusions\heparin LOW INTENSITY nomogram for [facility] [id number]    02/04/24 1038     heparin 25,000 units in dextrose 5% (100 units/ml) IV  bolus from bag - ADDITIONAL PRN BOLUS - 30 units/kg (max bolus 4000 units)  As needed (PRN)        Question:  Heparin Infusion Adjustment (DO NOT MODIFY ANSWER)  Answer:  \\[email or web address]\Images\Pharmacy\HeparinInfusions\heparin LOW INTENSITY nomogram for [facility] [id number]    02/04/24 1038     heparin 25,000 units in dextrose 5% 250 mL (100 units/mL) infusion LOW INTENSITY nomogram - [facility]  Continuous        Question:  Begin at (units/kg/hr)  Answer:  12    02/04/24 1038     IP VTE HIGH RISK PATIENT  Once         02/03/24 0512     Place sequential compression device  Until discontinued         02/03/24 0512                    Assessment:   Hypertensive Urgency  --BP remains above goal  NSTEMI, likely type II due to hypertensive urgency and CKD; however she has multiple RF for CAD  --Troponin 0.299-0.206  CKD V  --BUN/creatinine 61.3/4.08 on admit  VHD- moderate MR per TTE 03/2022  Questionable Afib??  --none seen on tele review--actually SR with PACs  AMS  --CT head negative for acute intracranial abnormality  --resolved  Influenza B +   --started on tamiflu  No hx of GIB       (2.3.24)  A1c 5.3 (2.3.24)  Plan:   Nephrology having discussions with patient about starting dialysis  Defer antihypertensive management to nephrology   Will plan for outpatient PET upon discharge and recovery from flu illness  No evidence of Afib. DC heparin drip  Continue BB and aspirin.       [facility]IS signing off. Reconsult if needed.     Amy Gonzalez, JOSEP  Cardiology  [facility]   02/05/2024

## 2024-02-06 VITALS
DIASTOLIC BLOOD PRESSURE: 84 MMHG | OXYGEN SATURATION: 95 % | HEIGHT: 63 IN | BODY MASS INDEX: 34.96 KG/M2 | RESPIRATION RATE: 20 BRPM | TEMPERATURE: 98 F | WEIGHT: 197.31 LBS | SYSTOLIC BLOOD PRESSURE: 129 MMHG | HEART RATE: 71 BPM

## 2024-02-06 LAB
ALBUMIN SERPL-MCNC: 2.9 G/DL (ref 3.4–4.8)
ALBUMIN/GLOB SERPL: 1.1 RATIO (ref 1.1–2)
ALP SERPL-CCNC: 63 UNIT/L (ref 40–150)
ALT SERPL-CCNC: 32 UNIT/L (ref 0–55)
APTT PPP: 66.4 SECONDS (ref 23.2–33.7)
AST SERPL-CCNC: 23 UNIT/L (ref 5–34)
BILIRUB SERPL-MCNC: 0.5 MG/DL
BUN SERPL-MCNC: 55.3 MG/DL (ref 9.8–20.1)
CALCIUM SERPL-MCNC: 9 MG/DL (ref 8.4–10.2)
CHLORIDE SERPL-SCNC: 108 MMOL/L (ref 98–107)
CO2 SERPL-SCNC: 25 MMOL/L (ref 23–31)
CREAT SERPL-MCNC: 4.7 MG/DL (ref 0.55–1.02)
GFR SERPLBLD CREATININE-BSD FMLA CKD-EPI: 9 MLS/MIN/1.73/M2
GLOBULIN SER-MCNC: 2.7 GM/DL (ref 2.4–3.5)
GLUCOSE SERPL-MCNC: 101 MG/DL (ref 82–115)
PHOSPHATE SERPL-MCNC: 4.8 MG/DL (ref 2.3–4.7)
POTASSIUM SERPL-SCNC: 4.2 MMOL/L (ref 3.5–5.1)
PROT SERPL-MCNC: 5.6 GM/DL (ref 5.8–7.6)
SODIUM SERPL-SCNC: 145 MMOL/L (ref 136–145)

## 2024-02-06 PROCEDURE — 25000003 PHARM REV CODE 250: Performed by: HOSPITALIST

## 2024-02-06 PROCEDURE — 84100 ASSAY OF PHOSPHORUS: CPT | Performed by: INTERNAL MEDICINE

## 2024-02-06 PROCEDURE — 85730 THROMBOPLASTIN TIME PARTIAL: CPT | Performed by: HOSPITALIST

## 2024-02-06 PROCEDURE — 25000003 PHARM REV CODE 250: Performed by: NURSE PRACTITIONER

## 2024-02-06 PROCEDURE — 80053 COMPREHEN METABOLIC PANEL: CPT | Performed by: INTERNAL MEDICINE

## 2024-02-06 PROCEDURE — 63600175 PHARM REV CODE 636 W HCPCS: Performed by: HOSPITALIST

## 2024-02-06 PROCEDURE — 25000003 PHARM REV CODE 250: Performed by: INTERNAL MEDICINE

## 2024-02-06 PROCEDURE — 99232 SBSQ HOSP IP/OBS MODERATE 35: CPT | Mod: ,,, | Performed by: INTERNAL MEDICINE

## 2024-02-06 RX ORDER — FUROSEMIDE 40 MG/1
40 TABLET ORAL 2 TIMES DAILY
Qty: 60 TABLET | Refills: 11 | Status: SHIPPED | OUTPATIENT
Start: 2024-02-06 | End: 2025-02-05

## 2024-02-06 RX ORDER — OSELTAMIVIR PHOSPHATE 30 MG/1
30 CAPSULE ORAL DAILY
Qty: 5 CAPSULE | Refills: 0 | Status: SHIPPED | OUTPATIENT
Start: 2024-02-06 | End: 2024-02-11

## 2024-02-06 RX ADMIN — SPIRONOLACTONE 25 MG: 25 TABLET ORAL at 08:02

## 2024-02-06 RX ADMIN — NIFEDIPINE 60 MG: 60 TABLET, FILM COATED, EXTENDED RELEASE ORAL at 08:02

## 2024-02-06 RX ADMIN — ASPIRIN 81 MG CHEWABLE TABLET 81 MG: 81 TABLET CHEWABLE at 08:02

## 2024-02-06 RX ADMIN — FUROSEMIDE 40 MG: 10 INJECTION, SOLUTION INTRAMUSCULAR; INTRAVENOUS at 08:02

## 2024-02-06 RX ADMIN — CALCITRIOL CAPSULES 0.25 MCG 0.25 MCG: 0.25 CAPSULE ORAL at 08:02

## 2024-02-06 RX ADMIN — OSELTAMIVIR PHOSPHATE 30 MG: 30 CAPSULE ORAL at 08:02

## 2024-02-06 RX ADMIN — HYDRALAZINE HYDROCHLORIDE 50 MG: 50 TABLET ORAL at 08:02

## 2024-02-06 RX ADMIN — CLONIDINE HYDROCHLORIDE 0.2 MG: 0.2 TABLET ORAL at 08:02

## 2024-02-06 RX ADMIN — METOPROLOL SUCCINATE 25 MG: 25 TABLET, EXTENDED RELEASE ORAL at 08:02

## 2024-02-06 NOTE — PROGRESS NOTES
[facility]  Hospital Medicine Progress Note        Chief Complaint: Inpatient Follow-up     HPI:   73-year-old female with past medical history of hypertension presents to the emergency room outside facility 02/02/2024 with complaint shortness breath.  Reports that symptoms started 4 days prior.  Associated with a nonproductive cough.  Denies chest pain.  Had some mild nausea emesis.  Also reports increased peripheral edema.  Denies a known history of heart failure.  He was not followed by cardiologist as an.  At outside facility her blood pressure is noted to sustain over 200.  She was transferred to [facility] for higher level of care.  Upon arrival to our ER her blood pressure is 173/121.  She was afebrile.  He was noted to be tachypneic with a respiratory rate up to 34 but sustaining on room air with good oxygen saturations.  Labs showed no leukocytosis.  She was some mild hypokalemia at 3.3.  Sodium elevated at 151.  Creatinine is 4.08.  Labs from about 6 months ago show baseline around 2.7.  She was denies any known kidney disease.  She also tested positive for influenza B. BNP was 2100.  Troponin 0.299.  Showed chest x-ray showed bilateral pulmonary edema.  She was started on IV Lasix.  She was being admitted to the hospitalist group     Interval Hx:  Patient remains on room air and essentially asymptomatic.  No current complaints.  Nursing as well.  Cardiology has signed off.  I discontinued her heparin drip this morning.  We discussed the plan concerning her kidneys.  Patient understands that she will likely need dialysis in the near future but is trying to hold off as long as possible.  Unfortunately her creatinine did increasing in his morning to 4.7. Will what Nephrology is recommending.      Objective/physical exam:  General: In no acute distress, afebrile  Chest: Clear to auscultation bilaterally  Heart: RRR, +S1, S2, no appreciable murmur  Abdomen: Soft, nontender,  BS +  MSK: Warm, no lower extremity edema, no clubbing or cyanosis  Neurologic: Alert and oriented x4, Cranial nerve II-XII intact, Strength 5/5 in all 4 extremities    VITAL SIGNS: 24 HRS MIN & MAX LAST   Temp  Min: 97.5 °F (36.4 °C)  Max: 98.2 °F (36.8 °C) 97.9 °F (36.6 °C)   BP  Min: 123/75  Max: 143/98 128/79   Pulse  Min: 70  Max: 87  70   Resp  Min: 17  Max: 18 17   SpO2  Min: 95 %  Max: 98 % 95 %       Recent Labs   Lab 02/03/24  0528 02/04/24  0843 02/05/24  0051   WBC 5.98 6.28 7.09   RBC 4.44 4.49 4.19*   HGB 13.2 13.3 12.3   HCT 40.0 41.6 39.3   MCV 90.1 92.7 93.8   MCH 29.7 29.6 29.4   MCHC 33.0 32.0* 31.3*   RDW 15.9 16.0 16.1    198 193   MPV 10.5* 10.3 10.6*       Recent Labs   Lab 02/02/24 2109 02/03/24 0528 02/04/24 0843 02/05/24 2043 02/06/24  0513   * 149* 147*  --  145   K 3.3* 3.7 3.7 3.9 4.2   CO2 23 22* 23  --  25   BUN 61.3* 62.4* 57.9*  --  55.3*   CREATININE 4.08* 4.31* 4.39*  --  4.70*   CALCIUM 9.3 9.4 9.1  --  9.0   MG  --   --  2.30 2.20  --    ALBUMIN 3.0* 3.2*  --   --  2.9*   ALKPHOS 79 70  --   --  63   ALT 60* 58*  --   --  32   AST 41* 40*  --   --  23   BILITOT 0.8 0.8  --   --  0.5          Microbiology Results (last 7 days)       ** No results found for the last 168 hours. **             Radiology:  Echo    Left Ventricle: The left ventricle is normal in size. Moderately   increased wall thickness. Normal wall motion. There is low normal systolic   function with a visually estimated ejection fraction of 50 - 55%. There is   diastolic dysfunction.    Right Ventricle: Normal right ventricular cavity size. Systolic   function is normal.    Left Atrium: Left atrium is moderately dilated.    Aortic Valve: The aortic valve is a trileaflet valve. There is moderate   aortic valve sclerosis. There is moderate aortic regurgitation.    Mitral Valve: There is mild to moderate regurgitation.    Tricuspid Valve: There is mild regurgitation.    IVC/SVC: Intermediate venous  pressure at 8 mmHg.    Pericardium: There is a small effusion. No indication of cardiac   tamponade.  US Retroperitoneal Complete  Narrative: EXAMINATION:  US RETROPERITONEAL COMPLETE    CLINICAL HISTORY:  silvia;    TECHNIQUE:  Ultrasound evaluation of the kidneys, region of the ureters, and urinary bladder.    COMPARISON:  Ultrasound 05/04/2022    FINDINGS:  No hydronephrosis. Increased overall renal echogenicity.    Bladder only mildly distended.    Patent superior IVC.  Imaged segments of the abdominal aorta normal in caliber.    Measurements:    - Right kidney: 9.0 cm in length    - Left kidney: 9.0 cm in length  Impression: No hydronephrosis.  Likely medical renal disease.    Electronically signed by: Carlos Senior  Date:    02/03/2024  Time:    12:09  CT Head Without Contrast  Narrative: EXAMINATION:  CT of the head without contrast    CLINICAL HISTORY:  Confusion, possible stroke    TECHNIQUE:  Routine CT of the head was performed without contrast    Total DLP: 2486.48 mGy.cm    Automatic exposure control was utilized to reduce the patient's dose    COMPARISON:  None    FINDINGS:  There is no acute intracranial hemorrhage, midline shift, mass-effect, or extra-axial collection.  No sulcal effacement.  Gray-white matter differentiation is preserved.  The ventricles are normal in size and configuration for patient's age.  There is moderate patchy and confluent areas of decreased attenuation in the periventricular, deep, and subcortical white matter, while nonspecific, most commonly sequela of chronic microvascular ischemia.  Basal cisterns are patent.  Visualized osseous structures are intact.  There is complete opacification of the right maxillary sinus.  There is mild moderate mucosal thickening of the ethmoid air cells.  There is mild mucosal thickening of the left frontal sinus.  There is bony remodeling of the maxillary sinus suggesting chronic sinus disease.  Mastoid air cells are clear.  Impression: No  acute intracranial abnormality.    Chronic changes as above.    Electronically signed by: Gene Willard MD  Date:    02/03/2024  Time:    06:12  X-Ray Chest 1 View  Narrative: EXAMINATION:  Chest one view    CLINICAL HISTORY:  Shortness of breath    COMPARISON:  11/01/2017    FINDINGS:  Cardiac silhouette is enlarged.  There is central vascular congestion with mild perihilar interstitial markings and alveolar ground-glass densities which may reflect mild pulmonary edema.  No visible pneumothorax or consolidation.  Impression: Cardiac enlargement with central vascular congestion and perihilar interstitial and mild ground-glass opacities, suggesting mild pulmonary edema.    Electronically signed by: Gene Willard MD  Date:    02/03/2024  Time:    06:02      Scheduled Med:   aspirin  81 mg Oral Daily    calcitRIOL  0.25 mcg Oral Daily    cloNIDine  0.2 mg Oral BID    furosemide (LASIX) injection  40 mg Intravenous Q12H    hydrALAZINE  50 mg Oral TID    metoprolol succinate  25 mg Oral BID    NIFEdipine  60 mg Oral BID    oseltamivir  30 mg Oral Daily    potassium chloride SA  20 mEq Oral Daily    spironolactone  25 mg Oral Daily        Continuous Infusions:       PRN Meds:  cloNIDine, hydrALAZINE, melatonin, sodium chloride 0.9%       Assessment/Plan:   Acute heart failure exacerbation   Hypertensive urgency   NSTEMI, type 2 demand ischemia   Influenza B   Acute on chronic kidney disease, unknown stage    Continue Tamiflu, day 4 of planned 5.  Under isolation precautions until she was/9 if she remains around.    He was not requiring any supplemental O2 and symptomatically greatly improved.  Suspect he was symptoms were more from her heart failure and fluid overload rather than actual blue.  Cardiology has signed off.  No further inpatient workup required.  Heparin drip has been discontinued.  The recommending a PET stress as outpatient.  She will follow up clinic.  Unfortunately her renal function continues to  worsen.  Creatinine 4.7 this morning.  Nephrology has spoken to her about possible initiation dialysis but patient wishes to hold off stable.  Will see if nephrology has any further recommendations this morning, otherwise she potentially go home close outpatient follow-up      Chavez Miles MD   02/06/2024     All diagnosis and differential diagnosis have been reviewed; assessment and plan has been documented; I have personally reviewed the labs and test results that are presently available; I have reviewed the patients medication list; I have reviewed the consulting providers response and recommendations. I have reviewed or attempted to review medical records based upon their availability    All of the patient's questions have been  addressed and answered. Patient's is agreeable to the above stated plan. I will continue to monitor closely and make adjustments to medical management as needed.  _____________________________________________________________________

## 2024-02-06 NOTE — CARE UPDATE
[id number] Pt has an appt with her PCP, Dr Junaid Rodriguez on 2/19 @ 1:20. Informed Vivien of pt's appt. I also informed Vivien that Dr Miles is unable to sign any HH paper work so they may have to wait for pt goes to her appt with Dr Rodriguez before starting HH services. Dr Miles is aware of this.

## 2024-02-06 NOTE — PROGRESS NOTES
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 Other option for  her to come regularly to my office for close followup visits. she said she prefers not to come to my office  I also discussed with her possibility of 2nd opinion and and she is willing to go back and see Dr. Munoz.  Also considering maybe early dialysis at this point

## 2024-02-06 NOTE — PROGRESS NOTES
"It has been mentioned that I 'signed off " on patient  I did not .  I simply offered her a second opinion option and she agreed.    For further clarification, I offered patient to come to office for follow up and she was very reluctant to come and see us regularly and more frequently for followup   After patient consented to second opinion ( who have seen in house originally) I reconsulted Dr Munoz    "

## 2024-02-06 NOTE — CARE UPDATE
[id number] Rec consult for HH services. Spoke with pt re HH agencies and that her insurance limits her choices. Referral sent to [facility] thru care port.

## 2024-02-06 NOTE — DISCHARGE SUMMARY
[facility]  Hospital Medicine Discharge Summary    Admit Date: 2/3/2024  Discharge Date and Time: 2/6/20248:44 AM  Admitting Physician: Hospitalist team   Discharging Physician: Chavez Miles MD.  Primary Care Physician: Dk Rodriguez MD      Discharge Diagnoses:  Acute heart failure exacerbation   Hypertensive urgency   NSTEMI, type 2 demand ischemia   Influenza B   Acute on chronic kidney disease, unknown stage    Hospital Course:   73-year-old female with past medical history of hypertension presents to the emergency room outside facility 02/02/2024 with complaint shortness breath.  Reports that symptoms started 4 days prior.  Associated with a nonproductive cough.  Denies chest pain.  Had some mild nausea emesis.  Also reports increased peripheral edema.  Denies a known history of heart failure.  He was not followed by cardiologist as an.  At outside facility her blood pressure is noted to sustain over 200.  She was transferred to [facility] for higher level of care.  Upon arrival to our ER her blood pressure is 173/121.  She was afebrile.  He was noted to be tachypneic with a respiratory rate up to 34 but sustaining on room air with good oxygen saturations.  Labs showed no leukocytosis.  She was some mild hypokalemia at 3.3.  Sodium elevated at 151.  Creatinine is 4.08.  Labs from about 6 months ago show baseline around 2.7.  She was denies any known kidney disease.  She also tested positive for influenza B. BNP was 2100.  Troponin 0.299.  Showed chest x-ray showed bilateral pulmonary edema.  She was started on IV Lasix.  She was being admitted to the hospitalist group.  Patient was treated with aggressive diuresis and weaned to room air.  She was continues to do well.  Started on Tamiflu of which she received 3 days' worth in the hospital setting.  Afebrile.  Overall she was feeling better but kidney function continued to worsen.  Nephrology was consulted.  He was  "recommending initiation of hemodialysis but patient was not ready yet.  I discussed the case with Nephrology and if she was not wanting to start dialysis they will monitor her closely as an outpatient.  We discussed importance of maintaining a low-sodium diet and compliance with follow-up.  She will go home on Lasix 40 mg twice a day and she was 2 more days of Tamiflu to take as an outpatient.  Recommend she was stay home in isolation for flu until 2/8    Vitals:  Blood pressure 128/79, pulse 70, temperature 97.9 °F (36.6 °C), temperature source Oral, resp. rate 17, height 5' 2.99" (1.6 m), weight 89.5 kg (197 lb 5 oz), SpO2 95 %..    Physical Exam:  Awake, Alert, Oriented x 3, No new focal Neurologic deficit, Normal Affect  NC/AT, PERRLA, EOMI  Supple neck, no JVD, No cervical lymphadenopathy  Symmetrical chest, Good air entry bilaterally. No rhonchi, wheezes, crackles appreciated  RRR, No gallop, rub or murmur  +ve Bowel sounds x4, Abd soft and non tender, no rebound, guarding or rigidity  No Cyanosis, cludding or edema, No new rash or bruises    Procedures Performed: No admission procedures for hospital encounter.     Significant Diagnostic Studies: See Full reports for all details  No results displayed because visit has over 200 results.           Microbiology Results (last 7 days)       ** No results found for the last 168 hours. **             Echo    Result Date: 2/3/2024    Left Ventricle: The left ventricle is normal in size. Moderately increased wall thickness. Normal wall motion. There is low normal systolic function with a visually estimated ejection fraction of 50 - 55%. There is diastolic dysfunction.   Right Ventricle: Normal right ventricular cavity size. Systolic function is normal.   Left Atrium: Left atrium is moderately dilated.   Aortic Valve: The aortic valve is a trileaflet valve. There is moderate aortic valve sclerosis. There is moderate aortic regurgitation.   Mitral Valve: There is mild to " moderate regurgitation.   Tricuspid Valve: There is mild regurgitation.   IVC/SVC: Intermediate venous pressure at 8 mmHg.   Pericardium: There is a small effusion. No indication of cardiac tamponade.     US Retroperitoneal Complete    Result Date: 2/3/2024  EXAMINATION: US RETROPERITONEAL COMPLETE CLINICAL HISTORY: silvia; TECHNIQUE: Ultrasound evaluation of the kidneys, region of the ureters, and urinary bladder. COMPARISON: Ultrasound 05/04/2022 FINDINGS: No hydronephrosis. Increased overall renal echogenicity. Bladder only mildly distended. Patent superior IVC.  Imaged segments of the abdominal aorta normal in caliber. Measurements: - Right kidney: 9.0 cm in length - Left kidney: 9.0 cm in length     No hydronephrosis.  Likely medical renal disease. Electronically signed by: Carlos Senior Date:    02/03/2024 Time:    12:09    CT Head Without Contrast    Result Date: 2/3/2024  EXAMINATION: CT of the head without contrast CLINICAL HISTORY: Confusion, possible stroke TECHNIQUE: Routine CT of the head was performed without contrast Total DLP: 2486.48 mGy.cm Automatic exposure control was utilized to reduce the patient's dose COMPARISON: None FINDINGS: There is no acute intracranial hemorrhage, midline shift, mass-effect, or extra-axial collection.  No sulcal effacement.  Gray-white matter differentiation is preserved.  The ventricles are normal in size and configuration for patient's age.  There is moderate patchy and confluent areas of decreased attenuation in the periventricular, deep, and subcortical white matter, while nonspecific, most commonly sequela of chronic microvascular ischemia.  Basal cisterns are patent.  Visualized osseous structures are intact.  There is complete opacification of the right maxillary sinus.  There is mild moderate mucosal thickening of the ethmoid air cells.  There is mild mucosal thickening of the left frontal sinus.  There is bony remodeling of the maxillary sinus suggesting chronic  sinus disease.  Mastoid air cells are clear.     No acute intracranial abnormality. Chronic changes as above. Electronically signed by: Gene Willard MD Date:    02/03/2024 Time:    06:12    X-Ray Chest 1 View    Result Date: 2/3/2024  EXAMINATION: Chest one view CLINICAL HISTORY: Shortness of breath COMPARISON: 11/01/2017 FINDINGS: Cardiac silhouette is enlarged.  There is central vascular congestion with mild perihilar interstitial markings and alveolar ground-glass densities which may reflect mild pulmonary edema.  No visible pneumothorax or consolidation.     Cardiac enlargement with central vascular congestion and perihilar interstitial and mild ground-glass opacities, suggesting mild pulmonary edema. Electronically signed by: Gene Willard MD Date:    02/03/2024 Time:    06:02  - pulls last radiology orders        Medication List        START taking these medications      furosemide 40 MG tablet  Commonly known as: LASIX  Take 1 tablet (40 mg total) by mouth 2 (two) times daily.     oseltamivir 30 MG capsule  Commonly known as: TAMIFLU  Take 1 capsule (30 mg total) by mouth once daily. for 5 days            CONTINUE taking these medications      aspirin 81 mg Cap     calcitRIOL 0.25 MCG Cap  Commonly known as: ROCALTROL  Take 1 capsule (0.25 mcg total) by mouth once daily.     hydrALAZINE 50 MG tablet  Commonly known as: APRESOLINE  Take 1 tablet (50 mg total) by mouth 3 (three) times daily.     losartan 50 MG tablet  Commonly known as: COZAAR  Take 1 tablet (50 mg total) by mouth once daily.     metoprolol succinate 25 MG 24 hr tablet  Commonly known as: TOPROL-XL  Take 1 tablet (25 mg total) by mouth 2 (two) times daily.     NIFEdipine 60 MG (OSM) 24 hr tablet  Commonly known as: PROCARDIA-XL  Take 1 tablet (60 mg total) by mouth 2 (two) times a day.     nitroGLYCERIN 0.4 MG SL tablet  Commonly known as: NITROSTAT     potassium chloride SA 20 MEQ tablet  Commonly known as: K-DUR,KLOR-CON     spironolactone  25 MG tablet  Commonly known as: ALDACTONE  Take 1 tablet (25 mg total) by mouth once daily.               Where to Get Your Medications        These medications were sent to [facility] - [place]SE[place] - [address]  [address], [place] [address]      Phone: [phone]   furosemide 40 MG tablet  oseltamivir 30 MG capsule          Explained in detail to the patient about the discharge plan, medications, and follow-up visits. Pt understands and agrees with the treatment plan  Discharged Condition: stable  Diet: cardiac, low sodium  Disposition: home    Medications Per [place] med rec  Activities as tolerated  Follow up with your PCP in 2 wks   For further questions contact hospitalist office    Discharge time 33 minutes    For worsening symptoms, chest pain, shortness of breath, increased abdominal pain, high grade fever, stroke or stroke like symptoms, immediately go to the nearest Emergency Room or call 911 as soon as possible.        Chavez Sinclair M.D, on 2/6/2024. at 8:44 AM.

## 2024-02-06 NOTE — PROGRESS NOTES
Nephrology follow up progress note    HPI:      Jesi Cha is a 73 y.o. female reports that she has been doing good and has no more nausea vomiting shortness of breath chest pain abdominal pains.  No dysuria no hematuria no hesitancy frequency urgency no cough cold congestion.  Patient has followed Dr. Clemons for a long time but now she is not following his instructions to start the dialysis as her GFR is about 9-10 mL/min.  Patient thinks she would like to wait and keep on doing some alternate therapies and if she gets then she will start dialysis.  So Dr. Clemons signed her off the case and she will be seen by me and our group because we are on call.    Interval history:          Review of Systems:       Past medical, family, surgical, and social history reviewed and unchanged from initial consult note.     Objective:       VITAL SIGNS: 24 HR MIN & MAX LAST    Temp  Min: 97.5 °F (36.4 °C)  Max: 98.2 °F (36.8 °C)  97.7 °F (36.5 °C)        BP  Min: 128/79  Max: 138/97  129/84     Pulse  Min: 70  Max: 81  71     Resp  Min: 18  Max: 20  20    SpO2  Min: 95 %  Max: 97 %  95 %      GEN:  Moderately developed and nourished.  Awake alert oriented to time person place.  No acute distress noted.  HEENT: Conjunctiva anicteric, pupils reactive.  Extraocular movements intact.  No oral lesion.  Neck supple.  No JVD.  CV: RRR without rub or gallop.  PULM: CTAB, unlabored  ABD: Soft, NT/ND abdomen with NABS  EXT: No cyanosis or edema  SKIN: Warm and dry  PSYCH: Awake, alert, and appropriately conversant  Vascular access:  None          Component Value Date/Time     02/06/2024 0513     (H) 02/04/2024 0843     10/30/2021 0451     10/28/2021 0429    K 4.2 02/06/2024 0513    K 3.9 02/05/2024 2043    K 3.8 10/30/2021 0451    K 3.6 10/28/2021 0429    CHLORIDE 108 (H) 02/06/2024 0513    CHLORIDE 112 (H) 02/04/2024 0843    CO2 25 02/06/2024 0513    CO2 23 02/04/2024 0843    CO2 26 10/30/2021 0451    CO2 21  (L) 10/28/2021 0429    BUN 55.3 (H) 02/06/2024 0513    BUN 57.9 (H) 02/04/2024 0843    BUN 29 (H) 10/30/2021 0451    BUN 34 (H) 10/28/2021 0429    CREATININE 4.70 (H) 02/06/2024 0513    CREATININE 4.39 (H) 02/04/2024 0843    CREATININE 2.00 (H) 10/30/2021 0451    CREATININE 2.07 (H) 10/28/2021 0429    CALCIUM 9.0 02/06/2024 0513    CALCIUM 9.1 02/04/2024 0843    CALCIUM 9.0 10/30/2021 0451    CALCIUM 8.2 (L) 10/28/2021 0429    PHOS 4.8 (H) 02/06/2024 0513            Component Value Date/Time    WBC 7.09 02/05/2024 0051    WBC 6.28 02/04/2024 0843    HGB 12.3 02/05/2024 0051    HGB 13.3 02/04/2024 0843    HCT 39.3 02/05/2024 0051    HCT 41.6 02/04/2024 0843     02/05/2024 0051     02/04/2024 0843       Imaging reviewed      Assessment / Plan:   CKD 5 secondary to nephrosclerosis  Proteinuria  Hypertension  Pulmonary edema    Recommendations  Long discussion with the patient again today me detailing her about the need to consider renal replacement therapy of her choice.  Outpatient education will be given to this patient per our nurse Ms. Maryjane BELTRE.  Also detailed her that ultimately buildup uremia can cause multiorgan failure and death to which she expressed understanding and still wanted to wait for it.  Will see her back in couple of weeks in the office to continue to encourage and educate patient.

## 2024-02-07 ENCOUNTER — PATIENT OUTREACH (OUTPATIENT)
Dept: ADMINISTRATIVE | Facility: CLINIC | Age: 74
End: 2024-02-07
Payer: MEDICARE

## 2024-02-07 ENCOUNTER — PATIENT MESSAGE (OUTPATIENT)
Dept: ADMINISTRATIVE | Facility: CLINIC | Age: 74
End: 2024-02-07
Payer: MEDICARE

## 2024-02-07 LAB
ALDOST SERPL-MCNC: 18 NG/DL
RENIN PLAS-CCNC: 3.3 NG/ML/H

## 2024-02-07 PROCEDURE — G0180 MD CERTIFICATION HHA PATIENT: HCPCS | Mod: ,,, | Performed by: STUDENT IN AN ORGANIZED HEALTH CARE EDUCATION/TRAINING PROGRAM

## 2024-02-07 NOTE — PROGRESS NOTES
C3 nurse attempted to contact Jesi Cha  for a TCC post hospital discharge follow up call. No answer. No voicemail available. The patient has a scheduled HOSFU appointment with Dk Rodriguez MD  on 2/19/24 @ 1:20pm.

## 2024-02-07 NOTE — PROGRESS NOTES
C3 nurse attempted to contact Jesi Cha  for a TCC post hospital discharge follow up call. No answer. Left voicemail with callback information. The patient has a scheduled HOSFU appointment with Dk Rodriguez MD  on 2/19/24 @ 1:20pm.

## 2024-02-08 NOTE — PROGRESS NOTES
C3 nurse attempted to contact Jesiandreea Cha  for a TCC post hospital discharge follow up call. The patient is unable to conduct the call @ this time. The patient requested a callback.    The patient has a scheduled HOSFU appointment with Dk Rodriguez MD  on 2/19/24 @ 1:20pm.

## 2024-02-08 NOTE — PROGRESS NOTES
C3 nurse spoke with Jesi Cha  for a TCC post hospital discharge follow up call. The patient has a scheduled HOSFU appointment with Dk Rodriguez MD  on 2/19/24 @ 1:20pm.

## 2024-02-14 ENCOUNTER — LAB REQUISITION (OUTPATIENT)
Dept: LAB | Facility: HOSPITAL | Age: 74
End: 2024-02-14
Payer: MEDICARE

## 2024-02-14 DIAGNOSIS — I13.2 HYPERTENSIVE HEART AND CHRONIC KIDNEY DISEASE WITH HEART FAILURE AND WITH STAGE 5 CHRONIC KIDNEY DISEASE, OR END STAGE RENAL DISEASE: ICD-10-CM

## 2024-02-14 DIAGNOSIS — I50.9 HEART FAILURE, UNSPECIFIED: ICD-10-CM

## 2024-02-14 DIAGNOSIS — N18.5 CHRONIC KIDNEY DISEASE, STAGE 5: ICD-10-CM

## 2024-02-14 LAB
ALBUMIN SERPL-MCNC: 3.5 G/DL (ref 3.4–4.8)
ALBUMIN/GLOB SERPL: 1.2 RATIO (ref 1.1–2)
ALP SERPL-CCNC: 66 UNIT/L (ref 40–150)
ALT SERPL-CCNC: 19 UNIT/L (ref 0–55)
AST SERPL-CCNC: 17 UNIT/L (ref 5–34)
BILIRUB SERPL-MCNC: 0.4 MG/DL
BUN SERPL-MCNC: 73.9 MG/DL (ref 9.8–20.1)
CALCIUM SERPL-MCNC: 9.7 MG/DL (ref 8.4–10.2)
CHLORIDE SERPL-SCNC: 109 MMOL/L (ref 98–107)
CO2 SERPL-SCNC: 23 MMOL/L (ref 23–31)
CREAT SERPL-MCNC: 5.1 MG/DL (ref 0.55–1.02)
GFR SERPLBLD CREATININE-BSD FMLA CKD-EPI: 8 MLS/MIN/1.73/M2
GLOBULIN SER-MCNC: 3 GM/DL (ref 2.4–3.5)
GLUCOSE SERPL-MCNC: 87 MG/DL (ref 82–115)
POTASSIUM SERPL-SCNC: 4.7 MMOL/L (ref 3.5–5.1)
PROT SERPL-MCNC: 6.5 GM/DL (ref 5.8–7.6)
SODIUM SERPL-SCNC: 146 MMOL/L (ref 136–145)

## 2024-02-14 PROCEDURE — 80053 COMPREHEN METABOLIC PANEL: CPT

## 2024-02-20 ENCOUNTER — EXTERNAL HOME HEALTH (OUTPATIENT)
Dept: HOME HEALTH SERVICES | Facility: HOSPITAL | Age: 74
End: 2024-02-20
Payer: MEDICARE

## 2024-04-22 ENCOUNTER — DOCUMENT SCAN (OUTPATIENT)
Dept: HOME HEALTH SERVICES | Facility: HOSPITAL | Age: 74
End: 2024-04-22
Payer: MEDICARE

## 2024-06-14 ENCOUNTER — NURSE TRIAGE (OUTPATIENT)
Dept: ADMINISTRATIVE | Facility: CLINIC | Age: 74
End: 2024-06-14
Payer: MEDICARE

## 2024-06-14 NOTE — TELEPHONE ENCOUNTER
Pt's daughter, Darrel, calling in, states pt just got discharged from hospital today. While family was out started feeling weak, sleepy, and having chills. /74 HR 82. Dispo is call 911 now. Daughter verbalized understanding but states they will take her to the hospital. Advised to call back with further concerns.  Reason for Disposition   [1] SEVERE weakness (i.e., unable to walk or barely able to walk, requires support) AND [2] new-onset or worsening    Additional Information   Negative: SEVERE difficulty breathing (e.g., struggling for each breath, speaks in single words)   Negative: Shock suspected (e.g., cold/pale/clammy skin, too weak to stand, low BP, rapid pulse)   Negative: Difficult to awaken or acting confused (e.g., disoriented, slurred speech)   Negative: [1] Fainted > 15 minutes ago AND [2] still feels too weak or dizzy to stand    Protocols used: Weakness (Generalized) and Fatigue-A-AH

## 2024-06-17 ENCOUNTER — TELEPHONE (OUTPATIENT)
Dept: PRIMARY CARE CLINIC | Facility: CLINIC | Age: 74
End: 2024-06-17
Payer: MEDICARE

## 2024-06-27 DIAGNOSIS — N18.6 END STAGE RENAL DISEASE: Primary | ICD-10-CM

## 2024-07-19 DIAGNOSIS — Z01.818 OTHER SPECIFIED PRE-OPERATIVE EXAMINATION: Primary | ICD-10-CM

## 2024-07-19 DIAGNOSIS — N18.5 CHRONIC KIDNEY DISEASE, STAGE V: ICD-10-CM

## 2024-08-12 NOTE — PROGRESS NOTES
"      [facility] Vascular - Clinic Note  Rosales Lake MD      Patient Name: Jesi Cha                   : 1950     MRN: [id number]   Visit Date: 2024          History Present Illness     Reason for Visit: Dialysis Access Evaluation    Ms. Cha presents to the clinic in need of permanent hemodialysis access creation. She is being referred by her nephrologist, Dr. White. She is on hemodialysis using a right chest wall tunneled catheter. Vein mapping of her left arm obtained today as she is right handed. She denies pacemaker/ICD or history of mediport. Denies chest pain or shortness of breath.      REVIEW OF SYSTEMS:  12 point review of systems conducted, negative except as stated in the history of present illness. See HPI for details.        Physical Exam      Vitals:    24 1101 24 1103   BP: (!) 162/97 (!) 151/88   BP Location: Right arm Left arm   Pulse:  66   Weight: 94.3 kg (208 lb)    Height: 5' 2" (1.575 m)         General: well-nourished, no acute distress, and healthy appearing, alert, pleasant, conversant, and oriented  Neurologic: cranial nerves are grossly intact, no neurologic deficits, no motor deficits, and no sensory deficits  Neck/Chest: normal , soft without lymphadenopathy, and no carotid bruits noted  Respiratory: breathing easily, without respiratory distress, and normal breath sounds  Abdomen: normal and soft  Cardiology: regular rate and rhythm and no audible murmur    Upper Extremity Arterial Exam:   Right - radial is palpable and brachial is palpable  Left - radial is palpable and brachial is palpable    Dialysis Access:  right chest wall tunneled catheter  Assessment: no signs of infection, dressed appropriately     Musculoskeletal:   Upper Extremity: normal bilateral hand function and normal bilateral hand sensation            Assessment and Plan     Ms. Cha is a 73 y.o. with end stage renal failure who is in need of permanent access creation. She " is currently using a right chest wall  catheter without difficulty. There is no history of a pacemaker, defibrillator, or mediport. Vein mapping obtained today demonstrates anatomy suitable for a LEFT ARM ACCESS CREATION- LIKELY ARM GRAFT PLACEMENT. We discussed the risks and benefits of surgery at length including the risks of bleeding, infection, failure of access to mature, neurovascular injury, and/or steal syndrome. She wishes to proceed. She does report needing a cardiac catheterization, however was not able to due to kidney function. Will obtain cardiac clearance prior to surgery.       1. ESRD (end stage renal disease)    2. End stage renal disease  - Ambulatory referral/consult to Vascular Surgery           Imaging Obtained/Reviewed   Study: left upper extremity vein mapping  Date:   2024           Medical History     Past Medical History:   Diagnosis Date    ESRD (end stage renal disease)     HTN (hypertension)     Hyperparathyroidism     Hypokalemia     Obesity     Thyroid cyst     Venous insufficiency      Past Surgical History:   Procedure Laterality Date    APPENDECTOMY      CENTRAL VENOUS CATHETER INSERTION Right 06/10/2024    on HD     SECTION      CHOLECYSTECTOMY       Family History   Problem Relation Name Age of Onset    Arthritis Mother       Social History     Socioeconomic History    Marital status:    Tobacco Use    Smoking status: Never     Passive exposure: Never    Smokeless tobacco: Never   Substance and Sexual Activity    Alcohol use: Never    Drug use: Never    Sexual activity: Yes     Social Determinants of Health     Financial Resource Strain: Patient Declined (2024)    Received from [facility] Its Subsidiaries and Affiliates    Overall Financial Resource Strain (CARDIA)     Difficulty of Paying Living Expenses: Patient declined   Transportation Needs: Patient Declined (2024)    Received from [facility] Subsidiaries and Affiliates    PRAPARE - Transportation     Lack of Transportation (Medical): Patient declined     Lack of Transportation (Non-Medical): Patient declined     Current Outpatient Medications   Medication Instructions    aspirin 81 mg, Oral, Daily    calcitRIOL (ROCALTROL) 0.25 MCG Cap 1 capsule, Oral    chlorhexidine (PERIDEX) 0.12 % solution 15 mLs, 2 times daily    furosemide (LASIX) 40 mg, Oral, 2 times daily    hydrALAZINE (APRESOLINE) 50 mg, Oral, 3 times daily    losartan (COZAAR) 50 mg, Oral, Daily    metoprolol succinate (TOPROL-XL) 25 mg, Oral, 2 times daily    NIFEdipine (PROCARDIA-XL) 60 mg, Oral, 2 times daily    nitroGLYCERIN (NITROSTAT) 0.4 mg, Sublingual    potassium chloride SA (K-DUR,KLOR-CON) 20 MEQ tablet 20 mEq, Oral, Daily    rosuvastatin (CRESTOR) 10 mg, Oral, Nightly    spironolactone (ALDACTONE) 25 mg, Oral, Daily    traZODone (DESYREL) 50 mg, Oral, Nightly     Review of patient's allergies indicates:   Allergen Reactions    Codeine Other (See Comments)     Other Reaction(s): Not available       Patient Care Team:  Dk Rodriguez MD as PCP - General (Internal Medicine)  Erik White MD as Consulting Physician (Nephrology)  Shira Ramsey (Dialysis Center)  Presley Cantu MD as Consulting Physician (Cardiology)  [facility]      No follow-ups on file. In addition to their scheduled follow up, the patient has also been instructed to follow up on as needed basis.     No future appointments.

## 2024-08-19 ENCOUNTER — OFFICE VISIT (OUTPATIENT)
Dept: VASCULAR SURGERY | Facility: CLINIC | Age: 74
End: 2024-08-19
Payer: MEDICARE

## 2024-08-19 VITALS
HEIGHT: 62 IN | HEART RATE: 66 BPM | DIASTOLIC BLOOD PRESSURE: 88 MMHG | BODY MASS INDEX: 38.28 KG/M2 | WEIGHT: 208 LBS | SYSTOLIC BLOOD PRESSURE: 151 MMHG

## 2024-08-19 DIAGNOSIS — N18.6 END STAGE RENAL DISEASE: ICD-10-CM

## 2024-08-19 DIAGNOSIS — N18.6 ESRD (END STAGE RENAL DISEASE): Primary | ICD-10-CM

## 2024-08-19 RX ORDER — SODIUM CHLORIDE 9 MG/ML
INJECTION, SOLUTION INTRAVENOUS CONTINUOUS
OUTPATIENT
Start: 2024-08-19

## 2024-08-20 ENCOUNTER — TELEPHONE (OUTPATIENT)
Dept: PRIMARY CARE CLINIC | Facility: CLINIC | Age: 74
End: 2024-08-20
Payer: MEDICARE

## 2024-08-20 DIAGNOSIS — Z00.00 MEDICARE ANNUAL WELLNESS VISIT, SUBSEQUENT: Primary | ICD-10-CM

## 2024-08-20 DIAGNOSIS — I10 PRIMARY HYPERTENSION: ICD-10-CM

## 2024-08-20 NOTE — TELEPHONE ENCOUNTER
Patient contacted the office on today stating she needed a refill on her medication. Patient also stated she needed to make an appointment to get her refills. Patient was scheduled on 9/4 for her wellness. Lab orders were also placed, patient confirmed she will get those lab orders completed before her appointment. Patient stated she needed a refill on her furosemide and amlodipine but it is not on her current medication list.

## 2024-08-21 ENCOUNTER — LAB VISIT (OUTPATIENT)
Dept: LAB | Facility: HOSPITAL | Age: 74
End: 2024-08-21
Attending: STUDENT IN AN ORGANIZED HEALTH CARE EDUCATION/TRAINING PROGRAM
Payer: MEDICARE

## 2024-08-21 DIAGNOSIS — I10 PRIMARY HYPERTENSION: ICD-10-CM

## 2024-08-21 DIAGNOSIS — Z00.00 MEDICARE ANNUAL WELLNESS VISIT, SUBSEQUENT: ICD-10-CM

## 2024-08-21 LAB
ALBUMIN SERPL-MCNC: 3.9 G/DL (ref 3.4–4.8)
ALBUMIN/GLOB SERPL: 1.1 RATIO (ref 1.1–2)
ALP SERPL-CCNC: 73 UNIT/L (ref 40–150)
ALT SERPL-CCNC: 15 UNIT/L (ref 0–55)
ANION GAP SERPL CALC-SCNC: 12 MEQ/L
AST SERPL-CCNC: 17 UNIT/L (ref 5–34)
BACTERIA #/AREA URNS AUTO: ABNORMAL /HPF
BASOPHILS # BLD AUTO: 0.02 X10(3)/MCL
BASOPHILS NFR BLD AUTO: 0.3 %
BILIRUB SERPL-MCNC: 0.5 MG/DL
BILIRUB UR QL STRIP.AUTO: NEGATIVE
BUN SERPL-MCNC: 53.1 MG/DL (ref 9.8–20.1)
CALCIUM SERPL-MCNC: 10.3 MG/DL (ref 8.4–10.2)
CHLORIDE SERPL-SCNC: 103 MMOL/L (ref 98–107)
CHOLEST SERPL-MCNC: 190 MG/DL
CHOLEST/HDLC SERPL: 2 {RATIO} (ref 0–5)
CLARITY UR: CLEAR
CO2 SERPL-SCNC: 29 MMOL/L (ref 23–31)
COARSE GRAN CASTS URNS QL MICRO: ABNORMAL /LPF
COLOR UR AUTO: YELLOW
CREAT SERPL-MCNC: 5.28 MG/DL (ref 0.55–1.02)
CREAT/UREA NIT SERPL: 10
EOSINOPHIL # BLD AUTO: 0.08 X10(3)/MCL (ref 0–0.9)
EOSINOPHIL NFR BLD AUTO: 1.4 %
ERYTHROCYTE [DISTWIDTH] IN BLOOD BY AUTOMATED COUNT: 13.2 % (ref 11.5–17)
GFR SERPLBLD CREATININE-BSD FMLA CKD-EPI: 8 ML/MIN/1.73/M2
GLOBULIN SER-MCNC: 3.5 GM/DL (ref 2.4–3.5)
GLUCOSE SERPL-MCNC: 105 MG/DL (ref 82–115)
GLUCOSE UR QL STRIP: NEGATIVE
HCT VFR BLD AUTO: 38.2 % (ref 37–47)
HDLC SERPL-MCNC: 87 MG/DL (ref 35–60)
HGB BLD-MCNC: 12.6 G/DL (ref 12–16)
HGB UR QL STRIP: NEGATIVE
IMM GRANULOCYTES # BLD AUTO: 0.02 X10(3)/MCL (ref 0–0.04)
IMM GRANULOCYTES NFR BLD AUTO: 0.3 %
KETONES UR QL STRIP: ABNORMAL
LDLC SERPL CALC-MCNC: 92 MG/DL (ref 50–140)
LEUKOCYTE ESTERASE UR QL STRIP: ABNORMAL
LYMPHOCYTES # BLD AUTO: 1.75 X10(3)/MCL (ref 0.6–4.6)
LYMPHOCYTES NFR BLD AUTO: 29.7 %
MCH RBC QN AUTO: 30.6 PG (ref 27–31)
MCHC RBC AUTO-ENTMCNC: 33 G/DL (ref 33–36)
MCV RBC AUTO: 92.7 FL (ref 80–94)
MONOCYTES # BLD AUTO: 0.56 X10(3)/MCL (ref 0.1–1.3)
MONOCYTES NFR BLD AUTO: 9.5 %
NEUTROPHILS # BLD AUTO: 3.47 X10(3)/MCL (ref 2.1–9.2)
NEUTROPHILS NFR BLD AUTO: 58.8 %
NITRITE UR QL STRIP: NEGATIVE
PH UR STRIP: 5.5 [PH]
PLATELET # BLD AUTO: 172 X10(3)/MCL (ref 130–400)
PMV BLD AUTO: 9.8 FL (ref 7.4–10.4)
POTASSIUM SERPL-SCNC: 3.8 MMOL/L (ref 3.5–5.1)
PROT SERPL-MCNC: 7.4 GM/DL (ref 5.8–7.6)
PROT UR QL STRIP: 100
RBC # BLD AUTO: 4.12 X10(6)/MCL (ref 4.2–5.4)
RBC #/AREA URNS AUTO: ABNORMAL /HPF
SODIUM SERPL-SCNC: 144 MMOL/L (ref 136–145)
SP GR UR STRIP.AUTO: 1.02 (ref 1–1.03)
SQUAMOUS #/AREA URNS AUTO: ABNORMAL /HPF
TRIGL SERPL-MCNC: 54 MG/DL (ref 37–140)
TSH SERPL-ACNC: 0.67 UIU/ML (ref 0.35–4.94)
UROBILINOGEN UR STRIP-ACNC: 0.2
VLDLC SERPL CALC-MCNC: 11 MG/DL
WBC # BLD AUTO: 5.9 X10(3)/MCL (ref 4.5–11.5)
WBC #/AREA URNS AUTO: ABNORMAL /HPF

## 2024-08-21 PROCEDURE — 80053 COMPREHEN METABOLIC PANEL: CPT

## 2024-08-21 PROCEDURE — 84443 ASSAY THYROID STIM HORMONE: CPT

## 2024-08-21 PROCEDURE — 87086 URINE CULTURE/COLONY COUNT: CPT

## 2024-08-21 PROCEDURE — 36415 COLL VENOUS BLD VENIPUNCTURE: CPT

## 2024-08-21 PROCEDURE — 81003 URINALYSIS AUTO W/O SCOPE: CPT

## 2024-08-21 PROCEDURE — 85025 COMPLETE CBC W/AUTO DIFF WBC: CPT

## 2024-08-21 PROCEDURE — 80061 LIPID PANEL: CPT

## 2024-08-23 LAB — BACTERIA UR CULT: NORMAL

## 2024-09-12 ENCOUNTER — TELEPHONE (OUTPATIENT)
Dept: VASCULAR SURGERY | Facility: CLINIC | Age: 74
End: 2024-09-12
Payer: MEDICARE

## 2024-09-12 NOTE — TELEPHONE ENCOUNTER
Multiple attempts calling multiple phone numbers listed in patients chart. Patient was scheduled for testing and follow up for cardiac clearance. Patient cancelled all appointments with [facility]. Attempted to notify patient that we can not proceed with surgery since patient was not cleared by cardiology. Left multiple messages to call our office back and provided callback number.     9/13- re attempted to notify patient of surgery cancellation. Left voicemail. Will cancel her surgery at this time.

## 2024-09-20 ENCOUNTER — TELEPHONE (OUTPATIENT)
Dept: PRIMARY CARE CLINIC | Facility: CLINIC | Age: 74
End: 2024-09-20
Payer: MEDICARE

## 2024-09-20 NOTE — TELEPHONE ENCOUNTER
Pt called in stated that she's on dialysis and she wanted to know if its okay and compatible for her to take metoprolol 25mg once daily , methadipine 60mg twice daily, hydralazine 50mg 3x daily.?

## 2024-11-08 ENCOUNTER — OFFICE VISIT (OUTPATIENT)
Dept: PRIMARY CARE CLINIC | Facility: CLINIC | Age: 74
End: 2024-11-08
Payer: MEDICARE

## 2024-11-08 VITALS
WEIGHT: 188 LBS | DIASTOLIC BLOOD PRESSURE: 87 MMHG | RESPIRATION RATE: 18 BRPM | SYSTOLIC BLOOD PRESSURE: 121 MMHG | TEMPERATURE: 98 F | BODY MASS INDEX: 32.1 KG/M2 | HEIGHT: 64 IN | HEART RATE: 82 BPM | OXYGEN SATURATION: 98 %

## 2024-11-08 DIAGNOSIS — Z00.00 MEDICARE ANNUAL WELLNESS VISIT, SUBSEQUENT: Primary | ICD-10-CM

## 2024-11-08 DIAGNOSIS — Z99.2 ESRD ON HEMODIALYSIS: ICD-10-CM

## 2024-11-08 DIAGNOSIS — N18.6 ESRD ON HEMODIALYSIS: ICD-10-CM

## 2024-11-08 DIAGNOSIS — Z23 NEED FOR PNEUMOCOCCAL VACCINATION: ICD-10-CM

## 2024-11-08 DIAGNOSIS — Z12.31 SCREENING MAMMOGRAM FOR BREAST CANCER: ICD-10-CM

## 2024-11-08 PROCEDURE — 1101F PT FALLS ASSESS-DOCD LE1/YR: CPT | Mod: CPTII,,, | Performed by: STUDENT IN AN ORGANIZED HEALTH CARE EDUCATION/TRAINING PROGRAM

## 2024-11-08 PROCEDURE — G0439 PPPS, SUBSEQ VISIT: HCPCS | Mod: ,,, | Performed by: STUDENT IN AN ORGANIZED HEALTH CARE EDUCATION/TRAINING PROGRAM

## 2024-11-08 PROCEDURE — 3288F FALL RISK ASSESSMENT DOCD: CPT | Mod: CPTII,,, | Performed by: STUDENT IN AN ORGANIZED HEALTH CARE EDUCATION/TRAINING PROGRAM

## 2024-11-08 PROCEDURE — 90677 PCV20 VACCINE IM: CPT | Mod: ,,, | Performed by: STUDENT IN AN ORGANIZED HEALTH CARE EDUCATION/TRAINING PROGRAM

## 2024-11-08 PROCEDURE — 4010F ACE/ARB THERAPY RXD/TAKEN: CPT | Mod: CPTII,,, | Performed by: STUDENT IN AN ORGANIZED HEALTH CARE EDUCATION/TRAINING PROGRAM

## 2024-11-08 PROCEDURE — G0009 ADMIN PNEUMOCOCCAL VACCINE: HCPCS | Mod: ,,, | Performed by: STUDENT IN AN ORGANIZED HEALTH CARE EDUCATION/TRAINING PROGRAM

## 2024-11-08 PROCEDURE — 1159F MED LIST DOCD IN RCRD: CPT | Mod: CPTII,,, | Performed by: STUDENT IN AN ORGANIZED HEALTH CARE EDUCATION/TRAINING PROGRAM

## 2024-11-08 PROCEDURE — 3044F HG A1C LEVEL LT 7.0%: CPT | Mod: CPTII,,, | Performed by: STUDENT IN AN ORGANIZED HEALTH CARE EDUCATION/TRAINING PROGRAM

## 2024-11-08 PROCEDURE — 1160F RVW MEDS BY RX/DR IN RCRD: CPT | Mod: CPTII,,, | Performed by: STUDENT IN AN ORGANIZED HEALTH CARE EDUCATION/TRAINING PROGRAM

## 2024-11-08 PROCEDURE — 3079F DIAST BP 80-89 MM HG: CPT | Mod: CPTII,,, | Performed by: STUDENT IN AN ORGANIZED HEALTH CARE EDUCATION/TRAINING PROGRAM

## 2024-11-08 PROCEDURE — 3074F SYST BP LT 130 MM HG: CPT | Mod: CPTII,,, | Performed by: STUDENT IN AN ORGANIZED HEALTH CARE EDUCATION/TRAINING PROGRAM

## 2024-11-08 RX ORDER — SPIRONOLACTONE 50 MG/1
1 TABLET, FILM COATED ORAL DAILY
COMMUNITY
Start: 2024-06-13 | End: 2024-11-11

## 2024-11-08 NOTE — PROGRESS NOTES
Patient ID: [id number]     Chief Complaint: Medicare AWV    HPI:     Jesi Cha is a 74 y.o. female here today for a Medicare Wellness. Patient last seen 2023.    ESRD on HD - [facility] in [place]. Currently using tunneled cath R chest.  Follows Dr. White and Alexander for Nephrology  Shunt is being planned. Follows Dr. Verdugo (Cardiology) and Dr. Lake (vascular).    Opioid Screening: Patient medication list reviewed, patient is not taking prescription opioids. Patient is not using additional opioids than prescribed. Patient is at low risk of substance abuse based on this opioid use history.       -------------------------------------    Atrial fibrillation    Dialysis patient    T TH SAT    Digestive disorder    GERD    ESRD (end stage renal disease)    HTN (hypertension)    Hyperparathyroidism    Hypokalemia    Obesity    Thyroid cyst    Venous insufficiency        Past Surgical History:   Procedure Laterality Date    APPENDECTOMY      CENTRAL VENOUS CATHETER INSERTION Right 06/10/2024    on HD     SECTION      X2    CHOLECYSTECTOMY         Review of patient's allergies indicates:   Allergen Reactions    Codeine Other (See Comments)     Other Reaction(s): Not available       Outpatient Medications Marked as Taking for the 24 encounter (Office Visit) with Dk Rodriguez MD   Medication Sig Dispense Refill    furosemide (LASIX) 40 MG tablet Take 1 tablet (40 mg total) by mouth 2 (two) times daily. 60 tablet 11       Social History     Socioeconomic History    Marital status:    Tobacco Use    Smoking status: Never     Passive exposure: Never    Smokeless tobacco: Never   Substance and Sexual Activity    Alcohol use: Never    Drug use: Never    Sexual activity: Yes     Social Drivers of Health     Financial Resource Strain: Patient Declined (2024)    Received from [facility] and Its Subsidiaries and Affiliates    Overall Financial  Resource Strain (CARDIA)     Difficulty of Paying Living Expenses: Patient declined   Transportation Needs: Patient Declined (6/14/2024)    [facility] and Its Subsidiaries and Affiliates    PRAPARE - Transportation     Lack of Transportation (Medical): Patient declined     Lack of Transportation (Non-Medical): Patient declined        Family History   Problem Relation Name Age of Onset    Arthritis Mother          Patient Care Team:  Dk Rodriguez MD as PCP - General (Internal Medicine)  Erik White MD as Consulting Physician (Nephrology)  Shira Ramsey (Dialysis Center)  Luis Angel Verdugo MD as Consulting Physician (Cardiovascular Disease)  Rosales Lake MD as Vascular Surgery (Vascular Surgery)       Subjective:     Review of Systems   Constitutional:  Negative for chills and fever.   HENT:  Negative for sinus pressure/congestion and sore throat.    Respiratory:  Negative for cough, shortness of breath and wheezing.    Cardiovascular:  Negative for chest pain and leg swelling.   Gastrointestinal:  Negative for abdominal pain, nausea and vomiting.   Genitourinary:  Negative for dysuria and hematuria.   Musculoskeletal:  Negative for arthralgias and myalgias.   Integumentary:  Negative for rash.   Neurological:  Negative for dizziness and syncope.   Hematological:  Negative for adenopathy.   Psychiatric/Behavioral:  Negative for confusion. The patient is not nervous/anxious.      Patient Reported Health Risk Assessment  What is your age?: 65-69  Are you male or female?: Female  During the past four weeks, how much have you been bothered by emotional problems such as feeling anxious, depressed, irritable, sad, or downhearted and blue?: Not at all  During the past five weeks, has your physical and/or emotional health limited your social activities with family, friends, neighbors, or groups?: Not at all  During the past four weeks, how much  bodily pain have you generally had?: Moderate pain  During the past four weeks, was someone available to help if you needed and wanted help?: Yes, as much as I wanted  During the past four weeks, what was the hardest physical activity you could do for at least two minutes?: Light  Can you get to places out of walking distance without help?  (For example, can you travel alone on buses or taxis, or drive your own car?): Yes  Can you go shopping for groceries or clothes without someone's help?: Yes  Can you prepare your own meals?: Yes  Can you do your own housework without help?: Yes  Because of any health problems, do you need the help of another person with your personal care needs such as eating, bathing, dressing, or getting around the house?: No  Can you handle your own money without help?: Yes  During the past four weeks, how would you rate your health in general?: Good  How have things been going for you during the past four weeks?: Pretty well  Are you having difficulties driving your car?: No  Do you always fasten your seat belt when you are in a car?: Yes, usually  How often in the past four weeks have you been bothered by falling or dizzy when standing up?: Never  How often in the past four weeks have you been bothered by sexual problems?: Never  How often in the past four weeks have you been bothered by trouble eating well?: Never  How often in the past four weeks have you been bothered by teeth or denture problems?: Never  How often in the past four weeks have you been bothered with problems using the telephone?: Never  How often in the past four weeks have you been bothered by tiredness or fatigue?: Seldom  Have you fallen two or more times in the past year?: No  Are you afraid of falling?: No  Are you a smoker?: No  During the past four weeks, how many drinks of wine, beer, or other alcoholic beverages did you have?: No alcohol at all  Do you exercise for about 20 minutes three or more days a week?: No,  "I usually do not exercise this much  Have you been given any information to help you with hazards in your house that might hurt you?: Yes  Have you been given any information to help you with keeping track of your medications?: Yes  How often do you have trouble taking medicines the way you've been told to take them?: I always take them as prescribed  How confident are you that you can control and manage most of your health problems?: Very confident  What is your race? (Check all that apply.):     Objective:     /87 Comment: Pt reported home reading  Pulse 82   Temp 98.1 °F (36.7 °C)   Resp 18   Ht 5' 3.5" (1.613 m)   Wt 85.3 kg (188 lb)   SpO2 98%   BMI 32.78 kg/m²     Physical Exam  Vitals and nursing note reviewed.   Constitutional:       General: She is not in acute distress.  HENT:      Head: Normocephalic.      Nose: No rhinorrhea.   Eyes:      Conjunctiva/sclera: Conjunctivae normal.      Pupils: Pupils are equal, round, and reactive to light.   Cardiovascular:      Rate and Rhythm: Normal rate and regular rhythm.   Pulmonary:      Effort: Pulmonary effort is normal.      Breath sounds: Normal breath sounds.   Abdominal:      Palpations: Abdomen is soft.      Tenderness: There is no abdominal tenderness.   Musculoskeletal:         General: No deformity or signs of injury.   Skin:     General: Skin is warm and dry.   Neurological:      General: No focal deficit present.      Mental Status: She is alert. Mental status is at baseline.   Psychiatric:         Mood and Affect: Mood normal.         Behavior: Behavior normal.              No data to display                  11/8/2024    11:20 AM 8/19/2024    10:40 AM 7/14/2023    10:30 AM 6/16/2023    10:30 AM 6/14/2023    10:40 AM 3/31/2023    10:00 AM 12/19/2022    11:00 AM   Fall Risk Assessment - Outpatient   Mobility Status Ambulatory Ambulatory Ambulatory Ambulatory Ambulatory Ambulatory Ambulatory   Number of falls 0 0 0 0 0 0 0 "   Identified as fall risk False False False False False False False           Depression Screening  Over the past two weeks, has the patient felt down, depressed, or hopeless?: No  Over the past two weeks, has the patient felt little interest or pleasure in doing things?: No  Assessment/Plan:     1. Medicare annual wellness visit, subsequent  Assessment & Plan:  Reviewed recent wellness labs. See below for health maintenance.    Vaccinations:   - Flu: declined   - Pneumonia: PCV20 given 11/8/24   - Shingles (>50): recommended she receive at pharmacy   - Tdap: received 2022   - COVID: declined  Screening:   - Pap Smear (21-65): n/a   - Mammogram (>40): due, ordered   - Osteoporosis (all>65, sooner if risk factors): osteopenia 2023, repeat 2025/2026   - Lung Ca. Screening (50-80 if >20 pack yrs current or quit <15 yrs): n/a   - Colon Ca. Screening (age >45): Cologuard negative 2022   - Hep. C: screening negative 2024   - Cardiac: follows Cards (Dr. Verdugo)      2. Need for pneumococcal vaccination  -     pneumoc 20-beni conj-dip cr(PF) (PREVNAR-20 (PF)) injection Syrg 0.5 mL    3. ESRD on hemodialysis  Overview:  Follows Nephro (Dr. White and Dr. Munoz).  HD 3 days/wk at [facility] in [place]  Pt reports she's undergoing evaluation/planning for permanent access. Following Dr. Lake for Vascular.      4. Screening mammogram for breast cancer  -     Mammo Digital Screening Bilat w/ Jorge; Future; Expected date: 11/18/2024           Medicare Annual Wellness and Personalized Prevention Plan:   Fall Risk + Home Safety + Hearing Impairment + Depression Screen + Opioid and Substance Abuse Screening + Cognitive Impairment Screen + Health Risk Assessment all reviewed.     Health Maintenance Topics with due status: Not Due       Topic Last Completion Date    Colorectal Cancer Screening 04/21/2022    TETANUS VACCINE 07/08/2022    DEXA Scan 03/24/2023    Lipid Panel 08/21/2024      The patient's Health Maintenance was  reviewed and the following appears to be due at this time:   Health Maintenance Due   Topic Date Due    Shingles Vaccine (1 of 2) Never done    RSV Vaccine (Age 60+ and Pregnant patients) (1 - Risk 60-74 years 1-dose series) Never done    Mammogram  03/27/2024    Influenza Vaccine (1) Never done       Advance Care Planning   I attest to discussing Advance Care Planning with patient and/or family member.  Education was provided including the importance of the Health Care Power of , Advance Directives, and/or [place] documentation.  The patient expressed understanding to the importance of this information and discussion.         Follow up in about 1 year (around 11/8/2025) for Wellness. In addition to their scheduled follow up, the patient has also been instructed to follow up on as needed basis.

## 2024-11-11 PROBLEM — Z00.00 MEDICARE ANNUAL WELLNESS VISIT, SUBSEQUENT: Status: ACTIVE | Noted: 2024-11-11

## 2024-11-11 PROBLEM — Z99.2 ESRD ON HEMODIALYSIS: Status: ACTIVE | Noted: 2022-09-19

## 2024-11-11 PROBLEM — E87.6 HYPOKALEMIA: Status: RESOLVED | Noted: 2022-06-13 | Resolved: 2024-11-11

## 2024-11-11 PROBLEM — N18.6 ESRD ON HEMODIALYSIS: Status: ACTIVE | Noted: 2022-09-19

## 2024-11-11 PROBLEM — R03.0 ELEVATED BLOOD PRESSURE READING: Status: RESOLVED | Noted: 2024-02-03 | Resolved: 2024-11-11

## 2024-11-11 NOTE — ASSESSMENT & PLAN NOTE
Reviewed recent wellness labs. See below for health maintenance.    Vaccinations:   - Flu: declined   - Pneumonia: PCV20 given 11/8/24   - Shingles (>50): recommended she receive at pharmacy   - Tdap: received 2022   - COVID: declined  Screening:   - Pap Smear (21-65): n/a   - Mammogram (>40): due, ordered   - Osteoporosis (all>65, sooner if risk factors): osteopenia 2023, repeat 2025/2026   - Lung Ca. Screening (50-80 if >20 pack yrs current or quit <15 yrs): n/a   - Colon Ca. Screening (age >45): Cologuard negative 2022   - Hep. C: screening negative 2024   - Cardiac: follows Cards (Dr. Verdugo)